# Patient Record
Sex: MALE | Race: WHITE | Employment: FULL TIME
[De-identification: names, ages, dates, MRNs, and addresses within clinical notes are randomized per-mention and may not be internally consistent; named-entity substitution may affect disease eponyms.]

---

## 2017-10-16 ENCOUNTER — HOSPITAL ENCOUNTER (OUTPATIENT)
Dept: MRI IMAGING | Facility: CLINIC | Age: 57
Discharge: HOME | End: 2017-10-16
Payer: COMMERCIAL

## 2017-10-16 DIAGNOSIS — T14.8XXA SPRAIN AND STRAIN: ICD-10-CM

## 2017-10-16 DIAGNOSIS — M25.40 EFFUSION INTO JOINT: ICD-10-CM

## 2017-10-16 PROCEDURE — 73721 MRI JNT OF LWR EXTRE W/O DYE: CPT

## 2019-02-17 ENCOUNTER — APPOINTMENT (OUTPATIENT)
Dept: GENERAL RADIOLOGY | Age: 59
DRG: 623 | End: 2019-02-17
Payer: COMMERCIAL

## 2019-02-17 ENCOUNTER — HOSPITAL ENCOUNTER (INPATIENT)
Age: 59
LOS: 6 days | Discharge: HOME | DRG: 623 | End: 2019-02-23
Attending: EMERGENCY MEDICINE | Admitting: INTERNAL MEDICINE
Payer: COMMERCIAL

## 2019-02-17 DIAGNOSIS — E11.628 DIABETIC FOOT INFECTION (HCC): Primary | ICD-10-CM

## 2019-02-17 DIAGNOSIS — E11.43 DIABETIC AUTONOMIC NEUROPATHY ASSOCIATED WITH TYPE 2 DIABETES MELLITUS (HCC): ICD-10-CM

## 2019-02-17 DIAGNOSIS — L08.9 DIABETIC FOOT INFECTION (HCC): Primary | ICD-10-CM

## 2019-02-17 DIAGNOSIS — L97.522 SKIN ULCER OF TOE OF LEFT FOOT WITH FAT LAYER EXPOSED (HCC): ICD-10-CM

## 2019-02-17 PROBLEM — G47.33 OBSTRUCTIVE SLEEP APNEA: Status: ACTIVE | Noted: 2019-02-17

## 2019-02-17 PROBLEM — N28.9 RENAL INSUFFICIENCY: Status: ACTIVE | Noted: 2019-02-17

## 2019-02-17 PROBLEM — E87.29 HIGH ANION GAP METABOLIC ACIDOSIS: Status: ACTIVE | Noted: 2019-02-17

## 2019-02-17 PROBLEM — E66.01 OBESITY, MORBID, BMI 40.0-49.9 (HCC): Status: ACTIVE | Noted: 2019-02-17

## 2019-02-17 LAB
ABSOLUTE EOS #: 0.33 K/UL (ref 0–0.4)
ABSOLUTE IMMATURE GRANULOCYTE: ABNORMAL K/UL (ref 0–0.3)
ABSOLUTE LYMPH #: 1.22 K/UL (ref 1–4.8)
ABSOLUTE MONO #: 0.67 K/UL (ref 0.2–0.8)
ANION GAP SERPL CALCULATED.3IONS-SCNC: 18 MMOL/L (ref 9–17)
BASOPHILS # BLD: 1 %
BASOPHILS ABSOLUTE: 0.11 K/UL (ref 0–0.2)
BETA-HYDROXYBUTYRATE: 0.17 MMOL/L (ref 0.02–0.27)
BUN BLDV-MCNC: 24 MG/DL (ref 6–20)
BUN/CREAT BLD: 16 (ref 9–20)
C-REACTIVE PROTEIN: 113.4 MG/L (ref 0–5)
CALCIUM SERPL-MCNC: 9.3 MG/DL (ref 8.6–10.4)
CHLORIDE BLD-SCNC: 96 MMOL/L (ref 98–107)
CO2: 19 MMOL/L (ref 20–31)
CREAT SERPL-MCNC: 1.51 MG/DL (ref 0.7–1.2)
DIFFERENTIAL TYPE: ABNORMAL
EOSINOPHILS RELATIVE PERCENT: 3 % (ref 1–4)
GFR AFRICAN AMERICAN: 58 ML/MIN
GFR NON-AFRICAN AMERICAN: 48 ML/MIN
GFR SERPL CREATININE-BSD FRML MDRD: ABNORMAL ML/MIN/{1.73_M2}
GFR SERPL CREATININE-BSD FRML MDRD: ABNORMAL ML/MIN/{1.73_M2}
GLUCOSE BLD-MCNC: 179 MG/DL (ref 75–110)
GLUCOSE BLD-MCNC: 220 MG/DL (ref 75–110)
GLUCOSE BLD-MCNC: 244 MG/DL (ref 75–110)
GLUCOSE BLD-MCNC: 269 MG/DL (ref 75–110)
GLUCOSE BLD-MCNC: 363 MG/DL (ref 70–99)
HCT VFR BLD CALC: 45.4 % (ref 41–53)
HEMOGLOBIN: 15.3 G/DL (ref 13.5–17.5)
IMMATURE GRANULOCYTES: ABNORMAL %
LYMPHOCYTES # BLD: 11 % (ref 24–44)
MCH RBC QN AUTO: 30.7 PG (ref 26–34)
MCHC RBC AUTO-ENTMCNC: 33.7 G/DL (ref 31–37)
MCV RBC AUTO: 91.1 FL (ref 80–100)
MONOCYTES # BLD: 6 % (ref 1–7)
NRBC AUTOMATED: ABNORMAL PER 100 WBC
PDW BLD-RTO: 12.1 % (ref 11.5–14.5)
PLATELET # BLD: 202 K/UL (ref 130–400)
PLATELET ESTIMATE: ABNORMAL
PMV BLD AUTO: ABNORMAL FL (ref 6–12)
POTASSIUM SERPL-SCNC: 3.9 MMOL/L (ref 3.7–5.3)
RBC # BLD: 4.99 M/UL (ref 4.5–5.9)
RBC # BLD: ABNORMAL 10*6/UL
SEDIMENTATION RATE, ERYTHROCYTE: 45 MM (ref 0–15)
SEG NEUTROPHILS: 79 % (ref 36–66)
SEGMENTED NEUTROPHILS ABSOLUTE COUNT: 8.77 K/UL (ref 1.8–7.7)
SODIUM BLD-SCNC: 133 MMOL/L (ref 135–144)
WBC # BLD: 11.1 K/UL (ref 3.5–11)
WBC # BLD: ABNORMAL 10*3/UL

## 2019-02-17 PROCEDURE — 87205 SMEAR GRAM STAIN: CPT

## 2019-02-17 PROCEDURE — 87070 CULTURE OTHR SPECIMN AEROBIC: CPT

## 2019-02-17 PROCEDURE — 86140 C-REACTIVE PROTEIN: CPT

## 2019-02-17 PROCEDURE — 82947 ASSAY GLUCOSE BLOOD QUANT: CPT

## 2019-02-17 PROCEDURE — 6370000000 HC RX 637 (ALT 250 FOR IP): Performed by: INTERNAL MEDICINE

## 2019-02-17 PROCEDURE — 99223 1ST HOSP IP/OBS HIGH 75: CPT | Performed by: INTERNAL MEDICINE

## 2019-02-17 PROCEDURE — 73630 X-RAY EXAM OF FOOT: CPT

## 2019-02-17 PROCEDURE — 87077 CULTURE AEROBIC IDENTIFY: CPT

## 2019-02-17 PROCEDURE — 36415 COLL VENOUS BLD VENIPUNCTURE: CPT

## 2019-02-17 PROCEDURE — 85651 RBC SED RATE NONAUTOMATED: CPT

## 2019-02-17 PROCEDURE — 2500000003 HC RX 250 WO HCPCS: Performed by: INTERNAL MEDICINE

## 2019-02-17 PROCEDURE — 1200000000 HC SEMI PRIVATE

## 2019-02-17 PROCEDURE — 82010 KETONE BODYS QUAN: CPT

## 2019-02-17 PROCEDURE — 87186 SC STD MICRODIL/AGAR DIL: CPT

## 2019-02-17 PROCEDURE — 80048 BASIC METABOLIC PNL TOTAL CA: CPT

## 2019-02-17 PROCEDURE — 2500000003 HC RX 250 WO HCPCS: Performed by: EMERGENCY MEDICINE

## 2019-02-17 PROCEDURE — 2580000003 HC RX 258: Performed by: INTERNAL MEDICINE

## 2019-02-17 PROCEDURE — 6360000002 HC RX W HCPCS: Performed by: STUDENT IN AN ORGANIZED HEALTH CARE EDUCATION/TRAINING PROGRAM

## 2019-02-17 PROCEDURE — 85025 COMPLETE CBC W/AUTO DIFF WBC: CPT

## 2019-02-17 PROCEDURE — 87040 BLOOD CULTURE FOR BACTERIA: CPT

## 2019-02-17 PROCEDURE — 2580000003 HC RX 258: Performed by: STUDENT IN AN ORGANIZED HEALTH CARE EDUCATION/TRAINING PROGRAM

## 2019-02-17 PROCEDURE — 99284 EMERGENCY DEPT VISIT MOD MDM: CPT

## 2019-02-17 RX ORDER — POTASSIUM CHLORIDE 20MEQ/15ML
40 LIQUID (ML) ORAL PRN
Status: DISCONTINUED | OUTPATIENT
Start: 2019-02-17 | End: 2019-02-23 | Stop reason: HOSPADM

## 2019-02-17 RX ORDER — SODIUM CHLORIDE 0.9 % (FLUSH) 0.9 %
10 SYRINGE (ML) INJECTION PRN
Status: DISCONTINUED | OUTPATIENT
Start: 2019-02-17 | End: 2019-02-23 | Stop reason: HOSPADM

## 2019-02-17 RX ORDER — NICOTINE 21 MG/24HR
1 PATCH, TRANSDERMAL 24 HOURS TRANSDERMAL DAILY PRN
Status: DISCONTINUED | OUTPATIENT
Start: 2019-02-17 | End: 2019-02-23 | Stop reason: HOSPADM

## 2019-02-17 RX ORDER — GLIMEPIRIDE 2 MG/1
4 TABLET ORAL 2 TIMES DAILY WITH MEALS
Status: DISCONTINUED | OUTPATIENT
Start: 2019-02-17 | End: 2019-02-23 | Stop reason: HOSPADM

## 2019-02-17 RX ORDER — MAGNESIUM SULFATE 1 G/100ML
1 INJECTION INTRAVENOUS PRN
Status: DISCONTINUED | OUTPATIENT
Start: 2019-02-17 | End: 2019-02-23 | Stop reason: HOSPADM

## 2019-02-17 RX ORDER — NICOTINE POLACRILEX 4 MG
15 LOZENGE BUCCAL PRN
Status: DISCONTINUED | OUTPATIENT
Start: 2019-02-17 | End: 2019-02-23 | Stop reason: HOSPADM

## 2019-02-17 RX ORDER — POTASSIUM CHLORIDE 20 MEQ/1
40 TABLET, EXTENDED RELEASE ORAL PRN
Status: DISCONTINUED | OUTPATIENT
Start: 2019-02-17 | End: 2019-02-23 | Stop reason: HOSPADM

## 2019-02-17 RX ORDER — ONDANSETRON 2 MG/ML
4 INJECTION INTRAMUSCULAR; INTRAVENOUS EVERY 6 HOURS PRN
Status: DISCONTINUED | OUTPATIENT
Start: 2019-02-17 | End: 2019-02-17

## 2019-02-17 RX ORDER — ONDANSETRON 2 MG/ML
4 INJECTION INTRAMUSCULAR; INTRAVENOUS EVERY 6 HOURS PRN
Status: DISCONTINUED | OUTPATIENT
Start: 2019-02-17 | End: 2019-02-23 | Stop reason: HOSPADM

## 2019-02-17 RX ORDER — SODIUM HYPOCHLORITE 1.25 MG/ML
SOLUTION TOPICAL DAILY
Status: DISCONTINUED | OUTPATIENT
Start: 2019-02-17 | End: 2019-02-23 | Stop reason: HOSPADM

## 2019-02-17 RX ORDER — LORAZEPAM 1 MG/1
0.5 TABLET ORAL NIGHTLY
Status: DISCONTINUED | OUTPATIENT
Start: 2019-02-17 | End: 2019-02-23 | Stop reason: HOSPADM

## 2019-02-17 RX ORDER — BISACODYL 10 MG
10 SUPPOSITORY, RECTAL RECTAL DAILY PRN
Status: DISCONTINUED | OUTPATIENT
Start: 2019-02-17 | End: 2019-02-23 | Stop reason: HOSPADM

## 2019-02-17 RX ORDER — SODIUM CHLORIDE 0.9 % (FLUSH) 0.9 %
10 SYRINGE (ML) INJECTION EVERY 12 HOURS SCHEDULED
Status: DISCONTINUED | OUTPATIENT
Start: 2019-02-17 | End: 2019-02-23 | Stop reason: HOSPADM

## 2019-02-17 RX ORDER — SODIUM CHLORIDE 9 MG/ML
INJECTION, SOLUTION INTRAVENOUS CONTINUOUS
Status: DISCONTINUED | OUTPATIENT
Start: 2019-02-17 | End: 2019-02-18

## 2019-02-17 RX ORDER — ONDANSETRON 4 MG/1
4 TABLET, ORALLY DISINTEGRATING ORAL EVERY 6 HOURS PRN
Status: DISCONTINUED | OUTPATIENT
Start: 2019-02-17 | End: 2019-02-23 | Stop reason: HOSPADM

## 2019-02-17 RX ORDER — DEXTROSE MONOHYDRATE 25 G/50ML
12.5 INJECTION, SOLUTION INTRAVENOUS PRN
Status: DISCONTINUED | OUTPATIENT
Start: 2019-02-17 | End: 2019-02-23 | Stop reason: HOSPADM

## 2019-02-17 RX ORDER — POTASSIUM CHLORIDE 7.45 MG/ML
10 INJECTION INTRAVENOUS PRN
Status: DISCONTINUED | OUTPATIENT
Start: 2019-02-17 | End: 2019-02-23 | Stop reason: HOSPADM

## 2019-02-17 RX ORDER — DEXTROSE MONOHYDRATE 50 MG/ML
100 INJECTION, SOLUTION INTRAVENOUS PRN
Status: DISCONTINUED | OUTPATIENT
Start: 2019-02-17 | End: 2019-02-23 | Stop reason: HOSPADM

## 2019-02-17 RX ORDER — ALLOPURINOL 300 MG/1
300 TABLET ORAL DAILY
Status: DISCONTINUED | OUTPATIENT
Start: 2019-02-17 | End: 2019-02-23 | Stop reason: HOSPADM

## 2019-02-17 RX ORDER — ACETAMINOPHEN 325 MG/1
650 TABLET ORAL EVERY 4 HOURS PRN
Status: DISCONTINUED | OUTPATIENT
Start: 2019-02-17 | End: 2019-02-23 | Stop reason: HOSPADM

## 2019-02-17 RX ADMIN — ACETAMINOPHEN 650 MG: 325 TABLET ORAL at 18:41

## 2019-02-17 RX ADMIN — VANCOMYCIN HYDROCHLORIDE 1500 MG: 5 INJECTION, POWDER, LYOPHILIZED, FOR SOLUTION INTRAVENOUS at 14:25

## 2019-02-17 RX ADMIN — INSULIN LISPRO 1 UNITS: 100 INJECTION, SOLUTION INTRAVENOUS; SUBCUTANEOUS at 22:44

## 2019-02-17 RX ADMIN — INSULIN LISPRO 2 UNITS: 100 INJECTION, SOLUTION INTRAVENOUS; SUBCUTANEOUS at 18:33

## 2019-02-17 RX ADMIN — LORAZEPAM 0.5 MG: 1 TABLET ORAL at 22:44

## 2019-02-17 RX ADMIN — TAZOBACTAM SODIUM AND PIPERACILLIN SODIUM 3.38 G: 375; 3 INJECTION, SOLUTION INTRAVENOUS at 18:33

## 2019-02-17 RX ADMIN — SODIUM CHLORIDE: 9 INJECTION, SOLUTION INTRAVENOUS at 12:30

## 2019-02-17 RX ADMIN — TAZOBACTAM SODIUM AND PIPERACILLIN SODIUM 3.38 G: 375; 3 INJECTION, SOLUTION INTRAVENOUS at 10:54

## 2019-02-17 ASSESSMENT — ENCOUNTER SYMPTOMS
CONSTIPATION: 0
FACIAL SWELLING: 0
COLOR CHANGE: 1
DIARRHEA: 0
NAUSEA: 0
COLOR CHANGE: 0
VOMITING: 0
EYE DISCHARGE: 0
ABDOMINAL PAIN: 0
EYE REDNESS: 0
CHEST TIGHTNESS: 0
COUGH: 0
SHORTNESS OF BREATH: 0

## 2019-02-17 ASSESSMENT — PAIN SCALES - GENERAL
PAINLEVEL_OUTOF10: 0
PAINLEVEL_OUTOF10: 3
PAINLEVEL_OUTOF10: 3
PAINLEVEL_OUTOF10: 0

## 2019-02-17 ASSESSMENT — PAIN DESCRIPTION - PAIN TYPE: TYPE: ACUTE PAIN

## 2019-02-18 ENCOUNTER — APPOINTMENT (OUTPATIENT)
Dept: MRI IMAGING | Age: 59
DRG: 623 | End: 2019-02-18
Payer: COMMERCIAL

## 2019-02-18 ENCOUNTER — ANESTHESIA EVENT (OUTPATIENT)
Dept: OPERATING ROOM | Age: 59
DRG: 623 | End: 2019-02-18
Payer: COMMERCIAL

## 2019-02-18 LAB
ANION GAP SERPL CALCULATED.3IONS-SCNC: 14 MMOL/L (ref 9–17)
BUN BLDV-MCNC: 22 MG/DL (ref 6–20)
BUN/CREAT BLD: 16 (ref 9–20)
C-REACTIVE PROTEIN: 97.3 MG/L (ref 0–5)
CALCIUM SERPL-MCNC: 9.1 MG/DL (ref 8.6–10.4)
CHLORIDE BLD-SCNC: 99 MMOL/L (ref 98–107)
CO2: 23 MMOL/L (ref 20–31)
CREAT SERPL-MCNC: 1.41 MG/DL (ref 0.7–1.2)
CULTURE: NORMAL
DIRECT EXAM: NORMAL
GFR AFRICAN AMERICAN: >60 ML/MIN
GFR NON-AFRICAN AMERICAN: 51 ML/MIN
GFR SERPL CREATININE-BSD FRML MDRD: ABNORMAL ML/MIN/{1.73_M2}
GFR SERPL CREATININE-BSD FRML MDRD: ABNORMAL ML/MIN/{1.73_M2}
GLUCOSE BLD-MCNC: 193 MG/DL (ref 75–110)
GLUCOSE BLD-MCNC: 205 MG/DL (ref 75–110)
GLUCOSE BLD-MCNC: 216 MG/DL (ref 70–99)
GLUCOSE BLD-MCNC: 263 MG/DL (ref 75–110)
GLUCOSE BLD-MCNC: 305 MG/DL (ref 75–110)
HCT VFR BLD CALC: 42.6 % (ref 41–53)
HEMOGLOBIN: 14.4 G/DL (ref 13.5–17.5)
Lab: NORMAL
MCH RBC QN AUTO: 31 PG (ref 26–34)
MCHC RBC AUTO-ENTMCNC: 34 G/DL (ref 31–37)
MCV RBC AUTO: 91.4 FL (ref 80–100)
NRBC AUTOMATED: ABNORMAL PER 100 WBC
PDW BLD-RTO: 13.1 % (ref 11.5–14.5)
PLATELET # BLD: 221 K/UL (ref 130–400)
PMV BLD AUTO: 7.3 FL (ref 6–12)
POTASSIUM SERPL-SCNC: 4.3 MMOL/L (ref 3.7–5.3)
RBC # BLD: 4.66 M/UL (ref 4.5–5.9)
SODIUM BLD-SCNC: 136 MMOL/L (ref 135–144)
SPECIMEN DESCRIPTION: NORMAL
WBC # BLD: 12.1 K/UL (ref 3.5–11)

## 2019-02-18 PROCEDURE — 73718 MRI LOWER EXTREMITY W/O DYE: CPT

## 2019-02-18 PROCEDURE — 86140 C-REACTIVE PROTEIN: CPT

## 2019-02-18 PROCEDURE — 6360000002 HC RX W HCPCS: Performed by: INTERNAL MEDICINE

## 2019-02-18 PROCEDURE — 93923 UPR/LXTR ART STDY 3+ LVLS: CPT

## 2019-02-18 PROCEDURE — 6360000002 HC RX W HCPCS: Performed by: STUDENT IN AN ORGANIZED HEALTH CARE EDUCATION/TRAINING PROGRAM

## 2019-02-18 PROCEDURE — 85027 COMPLETE CBC AUTOMATED: CPT

## 2019-02-18 PROCEDURE — 2580000003 HC RX 258: Performed by: STUDENT IN AN ORGANIZED HEALTH CARE EDUCATION/TRAINING PROGRAM

## 2019-02-18 PROCEDURE — 36415 COLL VENOUS BLD VENIPUNCTURE: CPT

## 2019-02-18 PROCEDURE — 82947 ASSAY GLUCOSE BLOOD QUANT: CPT

## 2019-02-18 PROCEDURE — 1200000000 HC SEMI PRIVATE

## 2019-02-18 PROCEDURE — 2580000003 HC RX 258: Performed by: INTERNAL MEDICINE

## 2019-02-18 PROCEDURE — 2500000003 HC RX 250 WO HCPCS: Performed by: INTERNAL MEDICINE

## 2019-02-18 PROCEDURE — 6370000000 HC RX 637 (ALT 250 FOR IP): Performed by: INTERNAL MEDICINE

## 2019-02-18 PROCEDURE — 99232 SBSQ HOSP IP/OBS MODERATE 35: CPT | Performed by: INTERNAL MEDICINE

## 2019-02-18 PROCEDURE — 80202 ASSAY OF VANCOMYCIN: CPT

## 2019-02-18 PROCEDURE — 80048 BASIC METABOLIC PNL TOTAL CA: CPT

## 2019-02-18 RX ORDER — SODIUM CHLORIDE, SODIUM LACTATE, POTASSIUM CHLORIDE, CALCIUM CHLORIDE 600; 310; 30; 20 MG/100ML; MG/100ML; MG/100ML; MG/100ML
INJECTION, SOLUTION INTRAVENOUS CONTINUOUS
Status: CANCELLED | OUTPATIENT
Start: 2019-02-18

## 2019-02-18 RX ORDER — OXYCODONE AND ACETAMINOPHEN 10; 325 MG/1; MG/1
1 TABLET ORAL EVERY 4 HOURS PRN
Status: DISCONTINUED | OUTPATIENT
Start: 2019-02-18 | End: 2019-02-23 | Stop reason: HOSPADM

## 2019-02-18 RX ORDER — SODIUM CHLORIDE 9 MG/ML
INJECTION, SOLUTION INTRAVENOUS CONTINUOUS
Status: CANCELLED | OUTPATIENT
Start: 2019-02-18

## 2019-02-18 RX ORDER — SODIUM CHLORIDE 0.9 % (FLUSH) 0.9 %
10 SYRINGE (ML) INJECTION EVERY 12 HOURS SCHEDULED
Status: CANCELLED | OUTPATIENT
Start: 2019-02-18

## 2019-02-18 RX ORDER — MULTIVITAMIN WITH IRON
250 TABLET ORAL DAILY
COMMUNITY
End: 2020-12-22

## 2019-02-18 RX ORDER — OXYCODONE AND ACETAMINOPHEN 10; 325 MG/1; MG/1
1 TABLET ORAL 2 TIMES DAILY PRN
Status: ON HOLD | COMMUNITY
End: 2019-02-23 | Stop reason: HOSPADM

## 2019-02-18 RX ORDER — ALLOPURINOL 300 MG/1
300 TABLET ORAL DAILY
COMMUNITY

## 2019-02-18 RX ORDER — NAPROXEN SODIUM 220 MG
220 TABLET ORAL EVERY MORNING
Status: ON HOLD | COMMUNITY
End: 2019-02-23 | Stop reason: HOSPADM

## 2019-02-18 RX ORDER — GABAPENTIN 300 MG/1
300 CAPSULE ORAL 2 TIMES DAILY PRN
COMMUNITY
End: 2020-12-22 | Stop reason: ALTCHOICE

## 2019-02-18 RX ORDER — SODIUM CHLORIDE 0.9 % (FLUSH) 0.9 %
10 SYRINGE (ML) INJECTION PRN
Status: CANCELLED | OUTPATIENT
Start: 2019-02-18

## 2019-02-18 RX ORDER — CHOLECALCIFEROL (VITAMIN D3) 125 MCG
5-10 CAPSULE ORAL NIGHTLY
COMMUNITY

## 2019-02-18 RX ORDER — FLUTICASONE PROPIONATE 50 MCG
1-2 SPRAY, SUSPENSION (ML) NASAL DAILY PRN
COMMUNITY

## 2019-02-18 RX ORDER — GLIPIZIDE AND METFORMIN HCL 5; 500 MG/1; MG/1
2 TABLET, FILM COATED ORAL 2 TIMES DAILY
Status: ON HOLD | COMMUNITY
End: 2020-10-28 | Stop reason: HOSPADM

## 2019-02-18 RX ORDER — LIDOCAINE HYDROCHLORIDE 10 MG/ML
1 INJECTION, SOLUTION EPIDURAL; INFILTRATION; INTRACAUDAL; PERINEURAL
Status: CANCELLED | OUTPATIENT
Start: 2019-02-18 | End: 2019-02-18

## 2019-02-18 RX ORDER — ZOLPIDEM TARTRATE 5 MG/1
5 TABLET ORAL NIGHTLY PRN
Status: DISCONTINUED | OUTPATIENT
Start: 2019-02-18 | End: 2019-02-23 | Stop reason: HOSPADM

## 2019-02-18 RX ORDER — M-VIT,TX,IRON,MINS/CALC/FOLIC 27MG-0.4MG
1 TABLET ORAL DAILY
COMMUNITY

## 2019-02-18 RX ADMIN — TAZOBACTAM SODIUM AND PIPERACILLIN SODIUM 3.38 G: 375; 3 INJECTION, SOLUTION INTRAVENOUS at 18:22

## 2019-02-18 RX ADMIN — OXYCODONE AND ACETAMINOPHEN 1 TABLET: 10; 325 TABLET ORAL at 21:50

## 2019-02-18 RX ADMIN — LORAZEPAM 0.5 MG: 1 TABLET ORAL at 21:36

## 2019-02-18 RX ADMIN — VANCOMYCIN HYDROCHLORIDE 1500 MG: 5 INJECTION, POWDER, LYOPHILIZED, FOR SOLUTION INTRAVENOUS at 14:39

## 2019-02-18 RX ADMIN — INSULIN LISPRO 4 UNITS: 100 INJECTION, SOLUTION INTRAVENOUS; SUBCUTANEOUS at 17:16

## 2019-02-18 RX ADMIN — TAZOBACTAM SODIUM AND PIPERACILLIN SODIUM 3.38 G: 375; 3 INJECTION, SOLUTION INTRAVENOUS at 03:42

## 2019-02-18 RX ADMIN — OXYCODONE AND ACETAMINOPHEN 1 TABLET: 10; 325 TABLET ORAL at 14:46

## 2019-02-18 RX ADMIN — ENOXAPARIN SODIUM 40 MG: 40 INJECTION SUBCUTANEOUS at 21:36

## 2019-02-18 RX ADMIN — INSULIN LISPRO 1 UNITS: 100 INJECTION, SOLUTION INTRAVENOUS; SUBCUTANEOUS at 21:36

## 2019-02-18 RX ADMIN — INSULIN LISPRO 3 UNITS: 100 INJECTION, SOLUTION INTRAVENOUS; SUBCUTANEOUS at 11:52

## 2019-02-18 RX ADMIN — INSULIN LISPRO 1 UNITS: 100 INJECTION, SOLUTION INTRAVENOUS; SUBCUTANEOUS at 08:12

## 2019-02-18 RX ADMIN — GLIMEPIRIDE 4 MG: 2 TABLET ORAL at 08:12

## 2019-02-18 RX ADMIN — OXYCODONE AND ACETAMINOPHEN 1 TABLET: 10; 325 TABLET ORAL at 10:38

## 2019-02-18 RX ADMIN — ACETAMINOPHEN 650 MG: 325 TABLET ORAL at 01:37

## 2019-02-18 RX ADMIN — TAZOBACTAM SODIUM AND PIPERACILLIN SODIUM 3.38 G: 375; 3 INJECTION, SOLUTION INTRAVENOUS at 10:33

## 2019-02-18 RX ADMIN — VANCOMYCIN HYDROCHLORIDE 1500 MG: 5 INJECTION, POWDER, LYOPHILIZED, FOR SOLUTION INTRAVENOUS at 01:34

## 2019-02-18 RX ADMIN — ZOLPIDEM TARTRATE 5 MG: 5 TABLET ORAL at 21:36

## 2019-02-18 RX ADMIN — GLIMEPIRIDE 4 MG: 2 TABLET ORAL at 17:17

## 2019-02-18 RX ADMIN — ENOXAPARIN SODIUM 40 MG: 40 INJECTION SUBCUTANEOUS at 10:33

## 2019-02-18 RX ADMIN — SODIUM CHLORIDE: 9 INJECTION, SOLUTION INTRAVENOUS at 03:42

## 2019-02-18 RX ADMIN — ALLOPURINOL 300 MG: 300 TABLET ORAL at 08:12

## 2019-02-18 ASSESSMENT — PAIN DESCRIPTION - ONSET
ONSET: ON-GOING

## 2019-02-18 ASSESSMENT — PAIN DESCRIPTION - LOCATION
LOCATION: GENERALIZED
LOCATION: NECK
LOCATION: HEAD

## 2019-02-18 ASSESSMENT — PAIN DESCRIPTION - PAIN TYPE
TYPE: ACUTE PAIN

## 2019-02-18 ASSESSMENT — PAIN SCALES - GENERAL
PAINLEVEL_OUTOF10: 5
PAINLEVEL_OUTOF10: 4
PAINLEVEL_OUTOF10: 5
PAINLEVEL_OUTOF10: 1
PAINLEVEL_OUTOF10: 6

## 2019-02-18 ASSESSMENT — PAIN DESCRIPTION - PROGRESSION
CLINICAL_PROGRESSION: NOT CHANGED
CLINICAL_PROGRESSION: GRADUALLY WORSENING
CLINICAL_PROGRESSION: NOT CHANGED

## 2019-02-18 ASSESSMENT — PAIN DESCRIPTION - DESCRIPTORS
DESCRIPTORS: ACHING
DESCRIPTORS: HEADACHE
DESCRIPTORS: ACHING

## 2019-02-18 ASSESSMENT — PAIN DESCRIPTION - FREQUENCY
FREQUENCY: INTERMITTENT

## 2019-02-18 ASSESSMENT — PAIN DESCRIPTION - ORIENTATION
ORIENTATION: POSTERIOR
ORIENTATION: POSTERIOR

## 2019-02-19 ENCOUNTER — ANESTHESIA (OUTPATIENT)
Dept: OPERATING ROOM | Age: 59
DRG: 623 | End: 2019-02-19
Payer: COMMERCIAL

## 2019-02-19 VITALS — DIASTOLIC BLOOD PRESSURE: 78 MMHG | OXYGEN SATURATION: 94 % | SYSTOLIC BLOOD PRESSURE: 167 MMHG

## 2019-02-19 LAB
GLUCOSE BLD-MCNC: 147 MG/DL (ref 75–110)
GLUCOSE BLD-MCNC: 169 MG/DL (ref 75–110)
GLUCOSE BLD-MCNC: 172 MG/DL (ref 75–110)
GLUCOSE BLD-MCNC: 245 MG/DL (ref 75–110)
GLUCOSE BLD-MCNC: 308 MG/DL (ref 75–110)
VANCOMYCIN TROUGH DATE LAST DOSE: ABNORMAL
VANCOMYCIN TROUGH DOSE AMOUNT: ABNORMAL
VANCOMYCIN TROUGH TIME LAST DOSE: ABNORMAL
VANCOMYCIN TROUGH: 9.7 UG/ML (ref 10–20)

## 2019-02-19 PROCEDURE — 7100000001 HC PACU RECOVERY - ADDTL 15 MIN: Performed by: PODIATRIST

## 2019-02-19 PROCEDURE — 3600000002 HC SURGERY LEVEL 2 BASE: Performed by: PODIATRIST

## 2019-02-19 PROCEDURE — 1200000000 HC SEMI PRIVATE

## 2019-02-19 PROCEDURE — 2500000003 HC RX 250 WO HCPCS: Performed by: INTERNAL MEDICINE

## 2019-02-19 PROCEDURE — 6370000000 HC RX 637 (ALT 250 FOR IP): Performed by: INTERNAL MEDICINE

## 2019-02-19 PROCEDURE — 6360000002 HC RX W HCPCS: Performed by: STUDENT IN AN ORGANIZED HEALTH CARE EDUCATION/TRAINING PROGRAM

## 2019-02-19 PROCEDURE — 3700000001 HC ADD 15 MINUTES (ANESTHESIA): Performed by: PODIATRIST

## 2019-02-19 PROCEDURE — 0JBR0ZZ EXCISION OF LEFT FOOT SUBCUTANEOUS TISSUE AND FASCIA, OPEN APPROACH: ICD-10-PCS | Performed by: FAMILY MEDICINE

## 2019-02-19 PROCEDURE — 7100000000 HC PACU RECOVERY - FIRST 15 MIN: Performed by: PODIATRIST

## 2019-02-19 PROCEDURE — 87205 SMEAR GRAM STAIN: CPT

## 2019-02-19 PROCEDURE — 2580000003 HC RX 258: Performed by: NURSE ANESTHETIST, CERTIFIED REGISTERED

## 2019-02-19 PROCEDURE — 2709999900 HC NON-CHARGEABLE SUPPLY: Performed by: PODIATRIST

## 2019-02-19 PROCEDURE — 2580000003 HC RX 258: Performed by: STUDENT IN AN ORGANIZED HEALTH CARE EDUCATION/TRAINING PROGRAM

## 2019-02-19 PROCEDURE — 3600000012 HC SURGERY LEVEL 2 ADDTL 15MIN: Performed by: PODIATRIST

## 2019-02-19 PROCEDURE — 3700000000 HC ANESTHESIA ATTENDED CARE: Performed by: PODIATRIST

## 2019-02-19 PROCEDURE — 6360000002 HC RX W HCPCS: Performed by: NURSE ANESTHETIST, CERTIFIED REGISTERED

## 2019-02-19 PROCEDURE — 99232 SBSQ HOSP IP/OBS MODERATE 35: CPT | Performed by: INTERNAL MEDICINE

## 2019-02-19 PROCEDURE — 87070 CULTURE OTHR SPECIMN AEROBIC: CPT

## 2019-02-19 PROCEDURE — 87075 CULTR BACTERIA EXCEPT BLOOD: CPT

## 2019-02-19 PROCEDURE — 82947 ASSAY GLUCOSE BLOOD QUANT: CPT

## 2019-02-19 PROCEDURE — 2500000003 HC RX 250 WO HCPCS: Performed by: NURSE ANESTHETIST, CERTIFIED REGISTERED

## 2019-02-19 RX ORDER — FENTANYL CITRATE 50 UG/ML
25 INJECTION, SOLUTION INTRAMUSCULAR; INTRAVENOUS EVERY 5 MIN PRN
Status: DISCONTINUED | OUTPATIENT
Start: 2019-02-19 | End: 2019-02-19 | Stop reason: HOSPADM

## 2019-02-19 RX ORDER — PROPOFOL 10 MG/ML
INJECTION, EMULSION INTRAVENOUS CONTINUOUS PRN
Status: DISCONTINUED | OUTPATIENT
Start: 2019-02-19 | End: 2019-02-19 | Stop reason: SDUPTHER

## 2019-02-19 RX ORDER — MIDAZOLAM HYDROCHLORIDE 1 MG/ML
INJECTION INTRAMUSCULAR; INTRAVENOUS PRN
Status: DISCONTINUED | OUTPATIENT
Start: 2019-02-19 | End: 2019-02-19 | Stop reason: SDUPTHER

## 2019-02-19 RX ORDER — ONDANSETRON 2 MG/ML
4 INJECTION INTRAMUSCULAR; INTRAVENOUS
Status: DISCONTINUED | OUTPATIENT
Start: 2019-02-19 | End: 2019-02-19 | Stop reason: HOSPADM

## 2019-02-19 RX ORDER — FENTANYL CITRATE 50 UG/ML
INJECTION, SOLUTION INTRAMUSCULAR; INTRAVENOUS PRN
Status: DISCONTINUED | OUTPATIENT
Start: 2019-02-19 | End: 2019-02-19 | Stop reason: SDUPTHER

## 2019-02-19 RX ORDER — FENTANYL CITRATE 50 UG/ML
50 INJECTION, SOLUTION INTRAMUSCULAR; INTRAVENOUS EVERY 5 MIN PRN
Status: DISCONTINUED | OUTPATIENT
Start: 2019-02-19 | End: 2019-02-19 | Stop reason: HOSPADM

## 2019-02-19 RX ORDER — GLYCOPYRROLATE 1 MG/5 ML
SYRINGE (ML) INTRAVENOUS PRN
Status: DISCONTINUED | OUTPATIENT
Start: 2019-02-19 | End: 2019-02-19 | Stop reason: SDUPTHER

## 2019-02-19 RX ORDER — OXYCODONE HYDROCHLORIDE AND ACETAMINOPHEN 5; 325 MG/1; MG/1
1 TABLET ORAL
Status: DISCONTINUED | OUTPATIENT
Start: 2019-02-19 | End: 2019-02-19 | Stop reason: HOSPADM

## 2019-02-19 RX ORDER — SODIUM CHLORIDE 9 MG/ML
INJECTION, SOLUTION INTRAVENOUS CONTINUOUS PRN
Status: DISCONTINUED | OUTPATIENT
Start: 2019-02-19 | End: 2019-02-19 | Stop reason: SDUPTHER

## 2019-02-19 RX ORDER — KETAMINE HYDROCHLORIDE 100 MG/ML
INJECTION, SOLUTION INTRAMUSCULAR; INTRAVENOUS PRN
Status: DISCONTINUED | OUTPATIENT
Start: 2019-02-19 | End: 2019-02-19 | Stop reason: SDUPTHER

## 2019-02-19 RX ORDER — SODIUM CHLORIDE, SODIUM LACTATE, POTASSIUM CHLORIDE, CALCIUM CHLORIDE 600; 310; 30; 20 MG/100ML; MG/100ML; MG/100ML; MG/100ML
INJECTION, SOLUTION INTRAVENOUS CONTINUOUS PRN
Status: DISCONTINUED | OUTPATIENT
Start: 2019-02-19 | End: 2019-02-19 | Stop reason: SDUPTHER

## 2019-02-19 RX ORDER — LIDOCAINE HYDROCHLORIDE 20 MG/ML
INJECTION, SOLUTION EPIDURAL; INFILTRATION; INTRACAUDAL; PERINEURAL PRN
Status: DISCONTINUED | OUTPATIENT
Start: 2019-02-19 | End: 2019-02-19 | Stop reason: SDUPTHER

## 2019-02-19 RX ADMIN — KETAMINE HYDROCHLORIDE 10 MG: 100 INJECTION INTRAMUSCULAR; INTRAVENOUS at 13:48

## 2019-02-19 RX ADMIN — VANCOMYCIN HYDROCHLORIDE 1500 MG: 5 INJECTION, POWDER, LYOPHILIZED, FOR SOLUTION INTRAVENOUS at 00:43

## 2019-02-19 RX ADMIN — KETAMINE HYDROCHLORIDE 10 MG: 100 INJECTION INTRAMUSCULAR; INTRAVENOUS at 13:27

## 2019-02-19 RX ADMIN — OXYCODONE AND ACETAMINOPHEN 1 TABLET: 10; 325 TABLET ORAL at 15:48

## 2019-02-19 RX ADMIN — OXYCODONE AND ACETAMINOPHEN 1 TABLET: 10; 325 TABLET ORAL at 21:06

## 2019-02-19 RX ADMIN — TAZOBACTAM SODIUM AND PIPERACILLIN SODIUM 3.38 G: 375; 3 INJECTION, SOLUTION INTRAVENOUS at 19:59

## 2019-02-19 RX ADMIN — INSULIN LISPRO 2 UNITS: 100 INJECTION, SOLUTION INTRAVENOUS; SUBCUTANEOUS at 21:06

## 2019-02-19 RX ADMIN — KETAMINE HYDROCHLORIDE 10 MG: 100 INJECTION INTRAMUSCULAR; INTRAVENOUS at 13:15

## 2019-02-19 RX ADMIN — SODIUM CHLORIDE: 9 INJECTION, SOLUTION INTRAVENOUS at 13:06

## 2019-02-19 RX ADMIN — Medication 0.2 MG: at 13:40

## 2019-02-19 RX ADMIN — TAZOBACTAM SODIUM AND PIPERACILLIN SODIUM 3.38 G: 375; 3 INJECTION, SOLUTION INTRAVENOUS at 03:29

## 2019-02-19 RX ADMIN — SODIUM CHLORIDE, POTASSIUM CHLORIDE, SODIUM LACTATE AND CALCIUM CHLORIDE: 600; 310; 30; 20 INJECTION, SOLUTION INTRAVENOUS at 13:28

## 2019-02-19 RX ADMIN — INSULIN LISPRO 2 UNITS: 100 INJECTION, SOLUTION INTRAVENOUS; SUBCUTANEOUS at 16:47

## 2019-02-19 RX ADMIN — MIDAZOLAM 2 MG: 1 INJECTION INTRAMUSCULAR; INTRAVENOUS at 13:08

## 2019-02-19 RX ADMIN — LIDOCAINE HYDROCHLORIDE 100 MG: 20 INJECTION, SOLUTION EPIDURAL; INFILTRATION; INTRACAUDAL; PERINEURAL at 13:12

## 2019-02-19 RX ADMIN — KETAMINE HYDROCHLORIDE 10 MG: 100 INJECTION INTRAMUSCULAR; INTRAVENOUS at 13:13

## 2019-02-19 RX ADMIN — LORAZEPAM 0.5 MG: 1 TABLET ORAL at 21:06

## 2019-02-19 RX ADMIN — ZOLPIDEM TARTRATE 5 MG: 5 TABLET ORAL at 21:06

## 2019-02-19 RX ADMIN — KETAMINE HYDROCHLORIDE 10 MG: 100 INJECTION INTRAMUSCULAR; INTRAVENOUS at 13:37

## 2019-02-19 RX ADMIN — GLIMEPIRIDE 4 MG: 2 TABLET ORAL at 16:47

## 2019-02-19 RX ADMIN — VANCOMYCIN HYDROCHLORIDE 1750 MG: 1 INJECTION, POWDER, LYOPHILIZED, FOR SOLUTION INTRAVENOUS at 15:00

## 2019-02-19 RX ADMIN — VANCOMYCIN HYDROCHLORIDE 1.75 G: 1 INJECTION, POWDER, LYOPHILIZED, FOR SOLUTION INTRAVENOUS at 13:28

## 2019-02-19 RX ADMIN — FENTANYL CITRATE 25 MCG: 50 INJECTION, SOLUTION INTRAMUSCULAR; INTRAVENOUS at 14:15

## 2019-02-19 RX ADMIN — ACETAMINOPHEN 650 MG: 325 TABLET ORAL at 08:03

## 2019-02-19 RX ADMIN — FENTANYL CITRATE 25 MCG: 50 INJECTION, SOLUTION INTRAMUSCULAR; INTRAVENOUS at 13:12

## 2019-02-19 RX ADMIN — FENTANYL CITRATE 50 MCG: 50 INJECTION, SOLUTION INTRAMUSCULAR; INTRAVENOUS at 14:10

## 2019-02-19 RX ADMIN — PROPOFOL 100 MCG/KG/MIN: 10 INJECTION, EMULSION INTRAVENOUS at 13:12

## 2019-02-19 ASSESSMENT — PULMONARY FUNCTION TESTS
PIF_VALUE: 1
PIF_VALUE: 0
PIF_VALUE: 1
PIF_VALUE: 0
PIF_VALUE: 1
PIF_VALUE: 0
PIF_VALUE: 1

## 2019-02-19 ASSESSMENT — PAIN DESCRIPTION - PAIN TYPE
TYPE: ACUTE PAIN
TYPE: ACUTE PAIN;SURGICAL PAIN

## 2019-02-19 ASSESSMENT — PAIN DESCRIPTION - ORIENTATION
ORIENTATION: RIGHT
ORIENTATION: ANTERIOR

## 2019-02-19 ASSESSMENT — PAIN DESCRIPTION - FREQUENCY
FREQUENCY: CONTINUOUS
FREQUENCY: CONTINUOUS

## 2019-02-19 ASSESSMENT — PAIN DESCRIPTION - ONSET
ONSET: ON-GOING
ONSET: GRADUAL

## 2019-02-19 ASSESSMENT — PAIN DESCRIPTION - DESCRIPTORS
DESCRIPTORS: ACHING
DESCRIPTORS: ACHING;DISCOMFORT;HEADACHE

## 2019-02-19 ASSESSMENT — PAIN SCALES - GENERAL
PAINLEVEL_OUTOF10: 0
PAINLEVEL_OUTOF10: 4
PAINLEVEL_OUTOF10: 4
PAINLEVEL_OUTOF10: 0
PAINLEVEL_OUTOF10: 5

## 2019-02-19 ASSESSMENT — PAIN DESCRIPTION - PROGRESSION
CLINICAL_PROGRESSION: GRADUALLY WORSENING
CLINICAL_PROGRESSION: NOT CHANGED

## 2019-02-19 ASSESSMENT — PAIN DESCRIPTION - LOCATION
LOCATION: FOOT
LOCATION: HEAD

## 2019-02-19 ASSESSMENT — PAIN - FUNCTIONAL ASSESSMENT: PAIN_FUNCTIONAL_ASSESSMENT: ACTIVITIES ARE NOT PREVENTED

## 2019-02-20 LAB
BUN BLDV-MCNC: 22 MG/DL (ref 6–20)
CREAT SERPL-MCNC: 1.42 MG/DL (ref 0.7–1.2)
CULTURE: ABNORMAL
DIRECT EXAM: ABNORMAL
GFR AFRICAN AMERICAN: >60 ML/MIN
GFR NON-AFRICAN AMERICAN: 51 ML/MIN
GFR SERPL CREATININE-BSD FRML MDRD: ABNORMAL ML/MIN/{1.73_M2}
GFR SERPL CREATININE-BSD FRML MDRD: ABNORMAL ML/MIN/{1.73_M2}
GLUCOSE BLD-MCNC: 244 MG/DL (ref 75–110)
GLUCOSE BLD-MCNC: 263 MG/DL (ref 75–110)
GLUCOSE BLD-MCNC: 340 MG/DL (ref 75–110)
GLUCOSE BLD-MCNC: 394 MG/DL (ref 75–110)
Lab: ABNORMAL
SPECIMEN DESCRIPTION: ABNORMAL

## 2019-02-20 PROCEDURE — 6370000000 HC RX 637 (ALT 250 FOR IP): Performed by: INTERNAL MEDICINE

## 2019-02-20 PROCEDURE — 84520 ASSAY OF UREA NITROGEN: CPT

## 2019-02-20 PROCEDURE — 6360000002 HC RX W HCPCS: Performed by: STUDENT IN AN ORGANIZED HEALTH CARE EDUCATION/TRAINING PROGRAM

## 2019-02-20 PROCEDURE — 2500000003 HC RX 250 WO HCPCS: Performed by: INTERNAL MEDICINE

## 2019-02-20 PROCEDURE — 97535 SELF CARE MNGMENT TRAINING: CPT

## 2019-02-20 PROCEDURE — 97116 GAIT TRAINING THERAPY: CPT

## 2019-02-20 PROCEDURE — 99254 IP/OBS CNSLTJ NEW/EST MOD 60: CPT | Performed by: INTERNAL MEDICINE

## 2019-02-20 PROCEDURE — 82565 ASSAY OF CREATININE: CPT

## 2019-02-20 PROCEDURE — 2580000003 HC RX 258: Performed by: STUDENT IN AN ORGANIZED HEALTH CARE EDUCATION/TRAINING PROGRAM

## 2019-02-20 PROCEDURE — 82947 ASSAY GLUCOSE BLOOD QUANT: CPT

## 2019-02-20 PROCEDURE — 36415 COLL VENOUS BLD VENIPUNCTURE: CPT

## 2019-02-20 PROCEDURE — 1200000000 HC SEMI PRIVATE

## 2019-02-20 PROCEDURE — 97166 OT EVAL MOD COMPLEX 45 MIN: CPT

## 2019-02-20 PROCEDURE — 97162 PT EVAL MOD COMPLEX 30 MIN: CPT

## 2019-02-20 PROCEDURE — 99232 SBSQ HOSP IP/OBS MODERATE 35: CPT | Performed by: INTERNAL MEDICINE

## 2019-02-20 RX ADMIN — TAZOBACTAM SODIUM AND PIPERACILLIN SODIUM 3.38 G: 375; 3 INJECTION, SOLUTION INTRAVENOUS at 17:37

## 2019-02-20 RX ADMIN — GLIMEPIRIDE 4 MG: 2 TABLET ORAL at 08:40

## 2019-02-20 RX ADMIN — TAZOBACTAM SODIUM AND PIPERACILLIN SODIUM 3.38 G: 375; 3 INJECTION, SOLUTION INTRAVENOUS at 12:07

## 2019-02-20 RX ADMIN — OXYCODONE AND ACETAMINOPHEN 1 TABLET: 10; 325 TABLET ORAL at 21:15

## 2019-02-20 RX ADMIN — VANCOMYCIN HYDROCHLORIDE 1750 MG: 1 INJECTION, POWDER, LYOPHILIZED, FOR SOLUTION INTRAVENOUS at 01:32

## 2019-02-20 RX ADMIN — INSULIN LISPRO 3 UNITS: 100 INJECTION, SOLUTION INTRAVENOUS; SUBCUTANEOUS at 12:05

## 2019-02-20 RX ADMIN — LORAZEPAM 0.5 MG: 1 TABLET ORAL at 21:09

## 2019-02-20 RX ADMIN — INSULIN LISPRO 3 UNITS: 100 INJECTION, SOLUTION INTRAVENOUS; SUBCUTANEOUS at 21:10

## 2019-02-20 RX ADMIN — OXYCODONE AND ACETAMINOPHEN 1 TABLET: 10; 325 TABLET ORAL at 08:53

## 2019-02-20 RX ADMIN — OXYCODONE AND ACETAMINOPHEN 1 TABLET: 10; 325 TABLET ORAL at 00:00

## 2019-02-20 RX ADMIN — ALLOPURINOL 300 MG: 300 TABLET ORAL at 08:40

## 2019-02-20 RX ADMIN — GLIMEPIRIDE 4 MG: 2 TABLET ORAL at 17:37

## 2019-02-20 RX ADMIN — INSULIN LISPRO 4 UNITS: 100 INJECTION, SOLUTION INTRAVENOUS; SUBCUTANEOUS at 17:37

## 2019-02-20 RX ADMIN — TAZOBACTAM SODIUM AND PIPERACILLIN SODIUM 3.38 G: 375; 3 INJECTION, SOLUTION INTRAVENOUS at 03:08

## 2019-02-20 RX ADMIN — INSULIN LISPRO 2 UNITS: 100 INJECTION, SOLUTION INTRAVENOUS; SUBCUTANEOUS at 08:40

## 2019-02-20 RX ADMIN — VANCOMYCIN HYDROCHLORIDE 1750 MG: 1 INJECTION, POWDER, LYOPHILIZED, FOR SOLUTION INTRAVENOUS at 14:49

## 2019-02-20 ASSESSMENT — PAIN DESCRIPTION - LOCATION
LOCATION: FOOT
LOCATION: FOOT

## 2019-02-20 ASSESSMENT — PAIN DESCRIPTION - ORIENTATION
ORIENTATION: LEFT
ORIENTATION: LEFT

## 2019-02-20 ASSESSMENT — PAIN DESCRIPTION - FREQUENCY
FREQUENCY: CONTINUOUS
FREQUENCY: CONTINUOUS

## 2019-02-20 ASSESSMENT — PAIN - FUNCTIONAL ASSESSMENT: PAIN_FUNCTIONAL_ASSESSMENT: PREVENTS OR INTERFERES SOME ACTIVE ACTIVITIES AND ADLS

## 2019-02-20 ASSESSMENT — PAIN DESCRIPTION - PAIN TYPE
TYPE: ACUTE PAIN;SURGICAL PAIN
TYPE: ACUTE PAIN;SURGICAL PAIN

## 2019-02-20 ASSESSMENT — PAIN DESCRIPTION - PROGRESSION
CLINICAL_PROGRESSION: NOT CHANGED
CLINICAL_PROGRESSION: GRADUALLY WORSENING

## 2019-02-20 ASSESSMENT — PAIN SCALES - GENERAL
PAINLEVEL_OUTOF10: 4
PAINLEVEL_OUTOF10: 6
PAINLEVEL_OUTOF10: 4
PAINLEVEL_OUTOF10: 5

## 2019-02-20 ASSESSMENT — PAIN DESCRIPTION - ONSET
ONSET: ON-GOING
ONSET: ON-GOING

## 2019-02-20 ASSESSMENT — PAIN DESCRIPTION - DESCRIPTORS
DESCRIPTORS: ACHING;DISCOMFORT
DESCRIPTORS: ACHING;DISCOMFORT

## 2019-02-21 ENCOUNTER — ANESTHESIA EVENT (OUTPATIENT)
Dept: OPERATING ROOM | Age: 59
DRG: 623 | End: 2019-02-21
Payer: COMMERCIAL

## 2019-02-21 PROBLEM — A49.8 BACTERIAL INFECTION DUE TO MORGANELLA MORGANII: Status: ACTIVE | Noted: 2019-02-21

## 2019-02-21 LAB
ABSOLUTE EOS #: 0.6 K/UL (ref 0–0.4)
ABSOLUTE IMMATURE GRANULOCYTE: ABNORMAL K/UL (ref 0–0.3)
ABSOLUTE LYMPH #: 1.9 K/UL (ref 1–4.8)
ABSOLUTE MONO #: 0.7 K/UL (ref 0.2–0.8)
ANION GAP SERPL CALCULATED.3IONS-SCNC: 13 MMOL/L (ref 9–17)
BASOPHILS # BLD: 0 % (ref 0–2)
BASOPHILS ABSOLUTE: 0 K/UL (ref 0–0.2)
BUN BLDV-MCNC: 20 MG/DL (ref 6–20)
BUN/CREAT BLD: 15 (ref 9–20)
CALCIUM SERPL-MCNC: 9.1 MG/DL (ref 8.6–10.4)
CHLORIDE BLD-SCNC: 101 MMOL/L (ref 98–107)
CO2: 24 MMOL/L (ref 20–31)
CREAT SERPL-MCNC: 1.34 MG/DL (ref 0.7–1.2)
DIFFERENTIAL TYPE: ABNORMAL
EOSINOPHILS RELATIVE PERCENT: 7 % (ref 1–4)
GFR AFRICAN AMERICAN: >60 ML/MIN
GFR NON-AFRICAN AMERICAN: 55 ML/MIN
GFR SERPL CREATININE-BSD FRML MDRD: ABNORMAL ML/MIN/{1.73_M2}
GFR SERPL CREATININE-BSD FRML MDRD: ABNORMAL ML/MIN/{1.73_M2}
GLUCOSE BLD-MCNC: 278 MG/DL (ref 70–99)
GLUCOSE BLD-MCNC: 284 MG/DL (ref 75–110)
GLUCOSE BLD-MCNC: 306 MG/DL (ref 75–110)
GLUCOSE BLD-MCNC: 307 MG/DL (ref 75–110)
GLUCOSE BLD-MCNC: 318 MG/DL (ref 75–110)
HCT VFR BLD CALC: 40.8 % (ref 41–53)
HEMOGLOBIN: 13.8 G/DL (ref 13.5–17.5)
IMMATURE GRANULOCYTES: ABNORMAL %
LYMPHOCYTES # BLD: 23 % (ref 24–44)
MCH RBC QN AUTO: 30.7 PG (ref 26–34)
MCHC RBC AUTO-ENTMCNC: 33.8 G/DL (ref 31–37)
MCV RBC AUTO: 91 FL (ref 80–100)
MONOCYTES # BLD: 9 % (ref 1–7)
NRBC AUTOMATED: ABNORMAL PER 100 WBC
PDW BLD-RTO: 12.8 % (ref 11.5–14.5)
PLATELET # BLD: 315 K/UL (ref 130–400)
PLATELET ESTIMATE: ABNORMAL
PMV BLD AUTO: 6.7 FL (ref 6–12)
POTASSIUM SERPL-SCNC: 4.8 MMOL/L (ref 3.7–5.3)
RBC # BLD: 4.49 M/UL (ref 4.5–5.9)
RBC # BLD: ABNORMAL 10*6/UL
SEG NEUTROPHILS: 61 % (ref 36–66)
SEGMENTED NEUTROPHILS ABSOLUTE COUNT: 5 K/UL (ref 1.8–7.7)
SODIUM BLD-SCNC: 138 MMOL/L (ref 135–144)
WBC # BLD: 8.2 K/UL (ref 3.5–11)
WBC # BLD: ABNORMAL 10*3/UL

## 2019-02-21 PROCEDURE — 97530 THERAPEUTIC ACTIVITIES: CPT

## 2019-02-21 PROCEDURE — 36415 COLL VENOUS BLD VENIPUNCTURE: CPT

## 2019-02-21 PROCEDURE — 6370000000 HC RX 637 (ALT 250 FOR IP): Performed by: STUDENT IN AN ORGANIZED HEALTH CARE EDUCATION/TRAINING PROGRAM

## 2019-02-21 PROCEDURE — 80048 BASIC METABOLIC PNL TOTAL CA: CPT

## 2019-02-21 PROCEDURE — 1200000000 HC SEMI PRIVATE

## 2019-02-21 PROCEDURE — 6370000000 HC RX 637 (ALT 250 FOR IP): Performed by: INTERNAL MEDICINE

## 2019-02-21 PROCEDURE — 99232 SBSQ HOSP IP/OBS MODERATE 35: CPT | Performed by: INTERNAL MEDICINE

## 2019-02-21 PROCEDURE — 85025 COMPLETE CBC W/AUTO DIFF WBC: CPT

## 2019-02-21 PROCEDURE — 2500000003 HC RX 250 WO HCPCS: Performed by: INTERNAL MEDICINE

## 2019-02-21 PROCEDURE — 82947 ASSAY GLUCOSE BLOOD QUANT: CPT

## 2019-02-21 PROCEDURE — 97110 THERAPEUTIC EXERCISES: CPT

## 2019-02-21 PROCEDURE — 97116 GAIT TRAINING THERAPY: CPT

## 2019-02-21 RX ORDER — SODIUM CHLORIDE 0.9 % (FLUSH) 0.9 %
10 SYRINGE (ML) INJECTION EVERY 12 HOURS SCHEDULED
Status: CANCELLED | OUTPATIENT
Start: 2019-02-21

## 2019-02-21 RX ORDER — SODIUM CHLORIDE 0.9 % (FLUSH) 0.9 %
10 SYRINGE (ML) INJECTION PRN
Status: CANCELLED | OUTPATIENT
Start: 2019-02-21

## 2019-02-21 RX ORDER — SODIUM CHLORIDE, SODIUM LACTATE, POTASSIUM CHLORIDE, CALCIUM CHLORIDE 600; 310; 30; 20 MG/100ML; MG/100ML; MG/100ML; MG/100ML
INJECTION, SOLUTION INTRAVENOUS CONTINUOUS
Status: CANCELLED | OUTPATIENT
Start: 2019-02-21

## 2019-02-21 RX ORDER — LIDOCAINE HYDROCHLORIDE 10 MG/ML
1 INJECTION, SOLUTION EPIDURAL; INFILTRATION; INTRACAUDAL; PERINEURAL
Status: CANCELLED | OUTPATIENT
Start: 2019-02-21 | End: 2019-02-21

## 2019-02-21 RX ORDER — SODIUM CHLORIDE 9 MG/ML
INJECTION, SOLUTION INTRAVENOUS CONTINUOUS
Status: CANCELLED | OUTPATIENT
Start: 2019-02-21

## 2019-02-21 RX ADMIN — OXYCODONE AND ACETAMINOPHEN 1 TABLET: 10; 325 TABLET ORAL at 22:45

## 2019-02-21 RX ADMIN — GLIMEPIRIDE 4 MG: 2 TABLET ORAL at 08:19

## 2019-02-21 RX ADMIN — OXYCODONE AND ACETAMINOPHEN 1 TABLET: 10; 325 TABLET ORAL at 17:40

## 2019-02-21 RX ADMIN — TAZOBACTAM SODIUM AND PIPERACILLIN SODIUM 3.38 G: 375; 3 INJECTION, SOLUTION INTRAVENOUS at 18:56

## 2019-02-21 RX ADMIN — OXYCODONE AND ACETAMINOPHEN 1 TABLET: 10; 325 TABLET ORAL at 12:54

## 2019-02-21 RX ADMIN — INSULIN LISPRO 2 UNITS: 100 INJECTION, SOLUTION INTRAVENOUS; SUBCUTANEOUS at 22:45

## 2019-02-21 RX ADMIN — TAZOBACTAM SODIUM AND PIPERACILLIN SODIUM 3.38 G: 375; 3 INJECTION, SOLUTION INTRAVENOUS at 03:30

## 2019-02-21 RX ADMIN — ALLOPURINOL 300 MG: 300 TABLET ORAL at 08:19

## 2019-02-21 RX ADMIN — INSULIN LISPRO 4 UNITS: 100 INJECTION, SOLUTION INTRAVENOUS; SUBCUTANEOUS at 12:52

## 2019-02-21 RX ADMIN — GLIMEPIRIDE 4 MG: 2 TABLET ORAL at 17:37

## 2019-02-21 RX ADMIN — DAKIN'S SOLUTION 0.125% (QUARTER STRENGTH) 473 ML: 0.12 SOLUTION at 11:38

## 2019-02-21 RX ADMIN — INSULIN LISPRO 3 UNITS: 100 INJECTION, SOLUTION INTRAVENOUS; SUBCUTANEOUS at 08:19

## 2019-02-21 RX ADMIN — INSULIN LISPRO 4 UNITS: 100 INJECTION, SOLUTION INTRAVENOUS; SUBCUTANEOUS at 17:37

## 2019-02-21 RX ADMIN — TAZOBACTAM SODIUM AND PIPERACILLIN SODIUM 3.38 G: 375; 3 INJECTION, SOLUTION INTRAVENOUS at 11:35

## 2019-02-21 RX ADMIN — OXYCODONE AND ACETAMINOPHEN 1 TABLET: 10; 325 TABLET ORAL at 08:24

## 2019-02-21 ASSESSMENT — PAIN SCALES - GENERAL
PAINLEVEL_OUTOF10: 5
PAINLEVEL_OUTOF10: 5
PAINLEVEL_OUTOF10: 6
PAINLEVEL_OUTOF10: 2
PAINLEVEL_OUTOF10: 5
PAINLEVEL_OUTOF10: 5
PAINLEVEL_OUTOF10: 2

## 2019-02-21 ASSESSMENT — PAIN DESCRIPTION - DESCRIPTORS
DESCRIPTORS: ACHING;DISCOMFORT

## 2019-02-21 ASSESSMENT — PAIN DESCRIPTION - PROGRESSION
CLINICAL_PROGRESSION: NOT CHANGED

## 2019-02-21 ASSESSMENT — PAIN DESCRIPTION - ONSET
ONSET: ON-GOING

## 2019-02-21 ASSESSMENT — PAIN DESCRIPTION - FREQUENCY
FREQUENCY: CONTINUOUS

## 2019-02-21 ASSESSMENT — PAIN - FUNCTIONAL ASSESSMENT
PAIN_FUNCTIONAL_ASSESSMENT: PREVENTS OR INTERFERES SOME ACTIVE ACTIVITIES AND ADLS

## 2019-02-21 ASSESSMENT — PAIN DESCRIPTION - PAIN TYPE
TYPE: ACUTE PAIN

## 2019-02-21 ASSESSMENT — PAIN DESCRIPTION - LOCATION
LOCATION: FOOT

## 2019-02-21 ASSESSMENT — PAIN DESCRIPTION - ORIENTATION
ORIENTATION: LEFT

## 2019-02-21 ASSESSMENT — ENCOUNTER SYMPTOMS
RESPIRATORY NEGATIVE: 1
GASTROINTESTINAL NEGATIVE: 1

## 2019-02-22 ENCOUNTER — ANESTHESIA (OUTPATIENT)
Dept: OPERATING ROOM | Age: 59
DRG: 623 | End: 2019-02-22
Payer: COMMERCIAL

## 2019-02-22 VITALS — OXYGEN SATURATION: 97 % | DIASTOLIC BLOOD PRESSURE: 71 MMHG | SYSTOLIC BLOOD PRESSURE: 131 MMHG

## 2019-02-22 LAB
BUN BLDV-MCNC: 17 MG/DL (ref 6–20)
CREAT SERPL-MCNC: 1.31 MG/DL (ref 0.7–1.2)
GFR AFRICAN AMERICAN: >60 ML/MIN
GFR NON-AFRICAN AMERICAN: 56 ML/MIN
GFR SERPL CREATININE-BSD FRML MDRD: ABNORMAL ML/MIN/{1.73_M2}
GFR SERPL CREATININE-BSD FRML MDRD: ABNORMAL ML/MIN/{1.73_M2}
GLUCOSE BLD-MCNC: 177 MG/DL (ref 75–110)
GLUCOSE BLD-MCNC: 222 MG/DL (ref 75–110)
GLUCOSE BLD-MCNC: 240 MG/DL (ref 75–110)
GLUCOSE BLD-MCNC: 347 MG/DL (ref 75–110)

## 2019-02-22 PROCEDURE — 2709999900 HC NON-CHARGEABLE SUPPLY: Performed by: PODIATRIST

## 2019-02-22 PROCEDURE — 3600000012 HC SURGERY LEVEL 2 ADDTL 15MIN: Performed by: PODIATRIST

## 2019-02-22 PROCEDURE — 2580000003 HC RX 258: Performed by: ANESTHESIOLOGY

## 2019-02-22 PROCEDURE — 36415 COLL VENOUS BLD VENIPUNCTURE: CPT

## 2019-02-22 PROCEDURE — 3600000002 HC SURGERY LEVEL 2 BASE: Performed by: PODIATRIST

## 2019-02-22 PROCEDURE — 7100000000 HC PACU RECOVERY - FIRST 15 MIN: Performed by: PODIATRIST

## 2019-02-22 PROCEDURE — 99232 SBSQ HOSP IP/OBS MODERATE 35: CPT | Performed by: INTERNAL MEDICINE

## 2019-02-22 PROCEDURE — 6360000002 HC RX W HCPCS: Performed by: ANESTHESIOLOGY

## 2019-02-22 PROCEDURE — 82565 ASSAY OF CREATININE: CPT

## 2019-02-22 PROCEDURE — 82947 ASSAY GLUCOSE BLOOD QUANT: CPT

## 2019-02-22 PROCEDURE — 7100000001 HC PACU RECOVERY - ADDTL 15 MIN: Performed by: PODIATRIST

## 2019-02-22 PROCEDURE — 3700000001 HC ADD 15 MINUTES (ANESTHESIA): Performed by: PODIATRIST

## 2019-02-22 PROCEDURE — 3700000000 HC ANESTHESIA ATTENDED CARE: Performed by: PODIATRIST

## 2019-02-22 PROCEDURE — 84520 ASSAY OF UREA NITROGEN: CPT

## 2019-02-22 PROCEDURE — 6370000000 HC RX 637 (ALT 250 FOR IP): Performed by: INTERNAL MEDICINE

## 2019-02-22 PROCEDURE — 0JQR0ZZ REPAIR LEFT FOOT SUBCUTANEOUS TISSUE AND FASCIA, OPEN APPROACH: ICD-10-PCS | Performed by: FAMILY MEDICINE

## 2019-02-22 PROCEDURE — 1200000000 HC SEMI PRIVATE

## 2019-02-22 PROCEDURE — 2500000003 HC RX 250 WO HCPCS: Performed by: ANESTHESIOLOGY

## 2019-02-22 PROCEDURE — 97110 THERAPEUTIC EXERCISES: CPT

## 2019-02-22 PROCEDURE — 6370000000 HC RX 637 (ALT 250 FOR IP): Performed by: NURSE PRACTITIONER

## 2019-02-22 PROCEDURE — 2500000003 HC RX 250 WO HCPCS: Performed by: INTERNAL MEDICINE

## 2019-02-22 RX ORDER — PROPOFOL 10 MG/ML
INJECTION, EMULSION INTRAVENOUS CONTINUOUS PRN
Status: DISCONTINUED | OUTPATIENT
Start: 2019-02-22 | End: 2019-02-22 | Stop reason: SDUPTHER

## 2019-02-22 RX ORDER — SODIUM CHLORIDE 9 MG/ML
INJECTION, SOLUTION INTRAVENOUS CONTINUOUS PRN
Status: DISCONTINUED | OUTPATIENT
Start: 2019-02-22 | End: 2019-02-22 | Stop reason: SDUPTHER

## 2019-02-22 RX ORDER — FENTANYL CITRATE 50 UG/ML
25 INJECTION, SOLUTION INTRAMUSCULAR; INTRAVENOUS EVERY 5 MIN PRN
Status: DISCONTINUED | OUTPATIENT
Start: 2019-02-22 | End: 2019-02-22 | Stop reason: HOSPADM

## 2019-02-22 RX ORDER — LIDOCAINE HYDROCHLORIDE 20 MG/ML
INJECTION, SOLUTION EPIDURAL; INFILTRATION; INTRACAUDAL; PERINEURAL PRN
Status: DISCONTINUED | OUTPATIENT
Start: 2019-02-22 | End: 2019-02-22 | Stop reason: SDUPTHER

## 2019-02-22 RX ORDER — PROMETHAZINE HYDROCHLORIDE 25 MG/ML
6.25 INJECTION, SOLUTION INTRAMUSCULAR; INTRAVENOUS
Status: DISCONTINUED | OUTPATIENT
Start: 2019-02-22 | End: 2019-02-22 | Stop reason: HOSPADM

## 2019-02-22 RX ORDER — LOPERAMIDE HYDROCHLORIDE 2 MG/1
2 CAPSULE ORAL 4 TIMES DAILY PRN
Status: DISCONTINUED | OUTPATIENT
Start: 2019-02-22 | End: 2019-02-23 | Stop reason: HOSPADM

## 2019-02-22 RX ORDER — PROPOFOL 10 MG/ML
INJECTION, EMULSION INTRAVENOUS PRN
Status: DISCONTINUED | OUTPATIENT
Start: 2019-02-22 | End: 2019-02-22 | Stop reason: SDUPTHER

## 2019-02-22 RX ORDER — LABETALOL HYDROCHLORIDE 5 MG/ML
5 INJECTION, SOLUTION INTRAVENOUS EVERY 10 MIN PRN
Status: DISCONTINUED | OUTPATIENT
Start: 2019-02-22 | End: 2019-02-22 | Stop reason: HOSPADM

## 2019-02-22 RX ORDER — HYDROMORPHONE HCL 110MG/55ML
0.5 PATIENT CONTROLLED ANALGESIA SYRINGE INTRAVENOUS EVERY 5 MIN PRN
Status: DISCONTINUED | OUTPATIENT
Start: 2019-02-22 | End: 2019-02-22 | Stop reason: HOSPADM

## 2019-02-22 RX ORDER — ONDANSETRON 2 MG/ML
4 INJECTION INTRAMUSCULAR; INTRAVENOUS
Status: DISCONTINUED | OUTPATIENT
Start: 2019-02-22 | End: 2019-02-22 | Stop reason: HOSPADM

## 2019-02-22 RX ORDER — OXYCODONE HYDROCHLORIDE AND ACETAMINOPHEN 5; 325 MG/1; MG/1
2 TABLET ORAL PRN
Status: DISCONTINUED | OUTPATIENT
Start: 2019-02-22 | End: 2019-02-22 | Stop reason: HOSPADM

## 2019-02-22 RX ORDER — OXYCODONE HYDROCHLORIDE AND ACETAMINOPHEN 5; 325 MG/1; MG/1
1 TABLET ORAL PRN
Status: DISCONTINUED | OUTPATIENT
Start: 2019-02-22 | End: 2019-02-22 | Stop reason: HOSPADM

## 2019-02-22 RX ORDER — HYDRALAZINE HYDROCHLORIDE 20 MG/ML
5 INJECTION INTRAMUSCULAR; INTRAVENOUS EVERY 10 MIN PRN
Status: DISCONTINUED | OUTPATIENT
Start: 2019-02-22 | End: 2019-02-22 | Stop reason: HOSPADM

## 2019-02-22 RX ADMIN — PROPOFOL 50 MG: 10 INJECTION, EMULSION INTRAVENOUS at 15:51

## 2019-02-22 RX ADMIN — LIDOCAINE HYDROCHLORIDE 100 MG: 20 INJECTION, SOLUTION EPIDURAL; INFILTRATION; INTRACAUDAL; PERINEURAL at 15:51

## 2019-02-22 RX ADMIN — INSULIN LISPRO 2 UNITS: 100 INJECTION, SOLUTION INTRAVENOUS; SUBCUTANEOUS at 12:00

## 2019-02-22 RX ADMIN — INSULIN LISPRO 1 UNITS: 100 INJECTION, SOLUTION INTRAVENOUS; SUBCUTANEOUS at 18:11

## 2019-02-22 RX ADMIN — INSULIN LISPRO 2 UNITS: 100 INJECTION, SOLUTION INTRAVENOUS; SUBCUTANEOUS at 08:48

## 2019-02-22 RX ADMIN — LOPERAMIDE HYDROCHLORIDE 2 MG: 2 CAPSULE ORAL at 20:40

## 2019-02-22 RX ADMIN — TAZOBACTAM SODIUM AND PIPERACILLIN SODIUM 3.38 G: 375; 3 INJECTION, SOLUTION INTRAVENOUS at 02:53

## 2019-02-22 RX ADMIN — PROPOFOL 100 MCG/KG/MIN: 10 INJECTION, EMULSION INTRAVENOUS at 15:51

## 2019-02-22 RX ADMIN — OXYCODONE AND ACETAMINOPHEN 1 TABLET: 10; 325 TABLET ORAL at 23:43

## 2019-02-22 RX ADMIN — OXYCODONE AND ACETAMINOPHEN 1 TABLET: 10; 325 TABLET ORAL at 08:54

## 2019-02-22 RX ADMIN — SODIUM CHLORIDE: 9 INJECTION, SOLUTION INTRAVENOUS at 15:44

## 2019-02-22 RX ADMIN — TAZOBACTAM SODIUM AND PIPERACILLIN SODIUM 3.38 G: 375; 3 INJECTION, SOLUTION INTRAVENOUS at 11:14

## 2019-02-22 RX ADMIN — OXYCODONE AND ACETAMINOPHEN 1 TABLET: 10; 325 TABLET ORAL at 18:56

## 2019-02-22 RX ADMIN — INSULIN LISPRO 2 UNITS: 100 INJECTION, SOLUTION INTRAVENOUS; SUBCUTANEOUS at 22:13

## 2019-02-22 RX ADMIN — LORAZEPAM 0.5 MG: 1 TABLET ORAL at 20:39

## 2019-02-22 RX ADMIN — TAZOBACTAM SODIUM AND PIPERACILLIN SODIUM 3.38 G: 375; 3 INJECTION, SOLUTION INTRAVENOUS at 18:54

## 2019-02-22 ASSESSMENT — PULMONARY FUNCTION TESTS
PIF_VALUE: 0
PIF_VALUE: 2
PIF_VALUE: 0
PIF_VALUE: 1
PIF_VALUE: 0
PIF_VALUE: 1
PIF_VALUE: 0
PIF_VALUE: 1
PIF_VALUE: 0

## 2019-02-22 ASSESSMENT — ENCOUNTER SYMPTOMS
GASTROINTESTINAL NEGATIVE: 1
RESPIRATORY NEGATIVE: 1

## 2019-02-22 ASSESSMENT — PAIN DESCRIPTION - ORIENTATION
ORIENTATION: LEFT
ORIENTATION: LEFT

## 2019-02-22 ASSESSMENT — PAIN DESCRIPTION - PAIN TYPE
TYPE: ACUTE PAIN
TYPE: ACUTE PAIN

## 2019-02-22 ASSESSMENT — PAIN DESCRIPTION - PROGRESSION
CLINICAL_PROGRESSION: NOT CHANGED
CLINICAL_PROGRESSION: NOT CHANGED

## 2019-02-22 ASSESSMENT — PAIN SCALES - GENERAL
PAINLEVEL_OUTOF10: 0
PAINLEVEL_OUTOF10: 6
PAINLEVEL_OUTOF10: 0
PAINLEVEL_OUTOF10: 6
PAINLEVEL_OUTOF10: 0
PAINLEVEL_OUTOF10: 6
PAINLEVEL_OUTOF10: 0

## 2019-02-22 ASSESSMENT — PAIN DESCRIPTION - DESCRIPTORS
DESCRIPTORS: ACHING;DISCOMFORT
DESCRIPTORS: ACHING;DISCOMFORT

## 2019-02-22 ASSESSMENT — PAIN DESCRIPTION - ONSET
ONSET: ON-GOING
ONSET: ON-GOING

## 2019-02-22 ASSESSMENT — PAIN DESCRIPTION - FREQUENCY
FREQUENCY: CONTINUOUS
FREQUENCY: CONTINUOUS

## 2019-02-22 ASSESSMENT — PAIN DESCRIPTION - LOCATION
LOCATION: FOOT
LOCATION: FOOT

## 2019-02-22 ASSESSMENT — PAIN - FUNCTIONAL ASSESSMENT
PAIN_FUNCTIONAL_ASSESSMENT: PREVENTS OR INTERFERES SOME ACTIVE ACTIVITIES AND ADLS
PAIN_FUNCTIONAL_ASSESSMENT: PREVENTS OR INTERFERES SOME ACTIVE ACTIVITIES AND ADLS

## 2019-02-23 VITALS
HEIGHT: 76 IN | BODY MASS INDEX: 38.36 KG/M2 | HEART RATE: 70 BPM | SYSTOLIC BLOOD PRESSURE: 126 MMHG | WEIGHT: 315 LBS | TEMPERATURE: 98.5 F | RESPIRATION RATE: 18 BRPM | DIASTOLIC BLOOD PRESSURE: 74 MMHG | OXYGEN SATURATION: 96 %

## 2019-02-23 LAB
CULTURE: NORMAL
CULTURE: NORMAL
GLUCOSE BLD-MCNC: 232 MG/DL (ref 75–110)
Lab: NORMAL
Lab: NORMAL
SPECIMEN DESCRIPTION: NORMAL
SPECIMEN DESCRIPTION: NORMAL

## 2019-02-23 PROCEDURE — 2500000003 HC RX 250 WO HCPCS: Performed by: INTERNAL MEDICINE

## 2019-02-23 PROCEDURE — 82947 ASSAY GLUCOSE BLOOD QUANT: CPT

## 2019-02-23 PROCEDURE — 6370000000 HC RX 637 (ALT 250 FOR IP): Performed by: INTERNAL MEDICINE

## 2019-02-23 PROCEDURE — 99239 HOSP IP/OBS DSCHRG MGMT >30: CPT | Performed by: INTERNAL MEDICINE

## 2019-02-23 RX ORDER — OXYCODONE AND ACETAMINOPHEN 10; 325 MG/1; MG/1
1 TABLET ORAL EVERY 4 HOURS PRN
Qty: 30 TABLET | Refills: 0 | Status: SHIPPED | OUTPATIENT
Start: 2019-02-23 | End: 2019-03-02

## 2019-02-23 RX ORDER — OXYCODONE AND ACETAMINOPHEN 10; 325 MG/1; MG/1
1 TABLET ORAL EVERY 4 HOURS PRN
Qty: 30 TABLET | Refills: 0 | Status: SHIPPED | OUTPATIENT
Start: 2019-02-23 | End: 2019-02-23

## 2019-02-23 RX ORDER — CIPROFLOXACIN 500 MG/1
500 TABLET, FILM COATED ORAL 2 TIMES DAILY
Qty: 28 TABLET | Refills: 0 | Status: SHIPPED | OUTPATIENT
Start: 2019-02-23 | End: 2019-02-23

## 2019-02-23 RX ORDER — CIPROFLOXACIN 500 MG/1
500 TABLET, FILM COATED ORAL 2 TIMES DAILY
Qty: 28 TABLET | Refills: 0 | Status: SHIPPED | OUTPATIENT
Start: 2019-02-23 | End: 2019-03-09

## 2019-02-23 RX ORDER — AMOXICILLIN AND CLAVULANATE POTASSIUM 875; 125 MG/1; MG/1
1 TABLET, FILM COATED ORAL 2 TIMES DAILY
Qty: 28 TABLET | Refills: 0 | Status: SHIPPED | OUTPATIENT
Start: 2019-02-23 | End: 2019-03-09

## 2019-02-23 RX ORDER — AMOXICILLIN AND CLAVULANATE POTASSIUM 875; 125 MG/1; MG/1
1 TABLET, FILM COATED ORAL 2 TIMES DAILY
Qty: 28 TABLET | Refills: 0 | Status: SHIPPED | OUTPATIENT
Start: 2019-02-23 | End: 2019-02-23

## 2019-02-23 RX ADMIN — TAZOBACTAM SODIUM AND PIPERACILLIN SODIUM 3.38 G: 375; 3 INJECTION, SOLUTION INTRAVENOUS at 12:02

## 2019-02-23 RX ADMIN — GLIMEPIRIDE 4 MG: 2 TABLET ORAL at 08:21

## 2019-02-23 RX ADMIN — OXYCODONE AND ACETAMINOPHEN 1 TABLET: 10; 325 TABLET ORAL at 12:05

## 2019-02-23 RX ADMIN — INSULIN LISPRO 2 UNITS: 100 INJECTION, SOLUTION INTRAVENOUS; SUBCUTANEOUS at 08:21

## 2019-02-23 RX ADMIN — INSULIN LISPRO 4 UNITS: 100 INJECTION, SOLUTION INTRAVENOUS; SUBCUTANEOUS at 12:02

## 2019-02-23 RX ADMIN — TAZOBACTAM SODIUM AND PIPERACILLIN SODIUM 3.38 G: 375; 3 INJECTION, SOLUTION INTRAVENOUS at 02:52

## 2019-02-23 RX ADMIN — OXYCODONE AND ACETAMINOPHEN 1 TABLET: 10; 325 TABLET ORAL at 08:20

## 2019-02-23 RX ADMIN — ALLOPURINOL 300 MG: 300 TABLET ORAL at 08:21

## 2019-02-23 ASSESSMENT — PAIN SCALES - GENERAL
PAINLEVEL_OUTOF10: 6
PAINLEVEL_OUTOF10: 6

## 2019-02-24 LAB
CULTURE: ABNORMAL
DIRECT EXAM: ABNORMAL
DIRECT EXAM: ABNORMAL
Lab: ABNORMAL
SPECIMEN DESCRIPTION: ABNORMAL

## 2019-02-26 LAB — GLUCOSE BLD-MCNC: 325 MG/DL (ref 75–110)

## 2019-03-12 ENCOUNTER — HOSPITAL ENCOUNTER (OUTPATIENT)
Age: 59
Setting detail: OUTPATIENT SURGERY
Discharge: HOME | End: 2019-03-12
Attending: PODIATRIST | Admitting: PODIATRIST
Payer: COMMERCIAL

## 2019-03-12 VITALS
BODY MASS INDEX: 38.36 KG/M2 | TEMPERATURE: 97.5 F | RESPIRATION RATE: 16 BRPM | HEART RATE: 72 BPM | OXYGEN SATURATION: 94 % | WEIGHT: 315 LBS | SYSTOLIC BLOOD PRESSURE: 119 MMHG | DIASTOLIC BLOOD PRESSURE: 71 MMHG | HEIGHT: 76 IN

## 2019-03-12 PROCEDURE — 2709999900 HC NON-CHARGEABLE SUPPLY: Performed by: PODIATRIST

## 2019-03-12 PROCEDURE — 3600000012 HC SURGERY LEVEL 2 ADDTL 15MIN: Performed by: PODIATRIST

## 2019-03-12 PROCEDURE — 87070 CULTURE OTHR SPECIMN AEROBIC: CPT

## 2019-03-12 PROCEDURE — 3600000002 HC SURGERY LEVEL 2 BASE: Performed by: PODIATRIST

## 2019-03-12 PROCEDURE — C1713 ANCHOR/SCREW BN/BN,TIS/BN: HCPCS | Performed by: PODIATRIST

## 2019-03-12 PROCEDURE — 7100000011 HC PHASE II RECOVERY - ADDTL 15 MIN: Performed by: PODIATRIST

## 2019-03-12 PROCEDURE — 7100000010 HC PHASE II RECOVERY - FIRST 15 MIN: Performed by: PODIATRIST

## 2019-03-12 PROCEDURE — 87205 SMEAR GRAM STAIN: CPT

## 2019-03-12 PROCEDURE — 6370000000 HC RX 637 (ALT 250 FOR IP): Performed by: PODIATRIST

## 2019-03-12 DEVICE — THERASKIN SM 2.5X5.1CM 13SQ CM: Type: IMPLANTABLE DEVICE | Status: FUNCTIONAL

## 2019-03-12 RX ORDER — MAGNESIUM CARB/ALUMINUM HYDROX 105-160MG
TABLET,CHEWABLE ORAL PRN
Status: DISCONTINUED | OUTPATIENT
Start: 2019-03-12 | End: 2019-03-12 | Stop reason: ALTCHOICE

## 2019-03-12 ASSESSMENT — PAIN SCALES - GENERAL: PAINLEVEL_OUTOF10: 0

## 2019-03-12 ASSESSMENT — PAIN DESCRIPTION - DESCRIPTORS: DESCRIPTORS: DULL

## 2019-03-12 ASSESSMENT — PAIN - FUNCTIONAL ASSESSMENT: PAIN_FUNCTIONAL_ASSESSMENT: 0-10

## 2019-03-14 LAB
CULTURE: NORMAL
DIRECT EXAM: NORMAL
DIRECT EXAM: NORMAL
Lab: NORMAL
SPECIMEN DESCRIPTION: NORMAL

## 2019-04-03 ENCOUNTER — HOSPITAL ENCOUNTER (OUTPATIENT)
Age: 59
Setting detail: OUTPATIENT SURGERY
Discharge: HOME | End: 2019-04-03
Attending: PODIATRIST | Admitting: PODIATRIST
Payer: COMMERCIAL

## 2019-04-03 VITALS
TEMPERATURE: 97.2 F | SYSTOLIC BLOOD PRESSURE: 131 MMHG | RESPIRATION RATE: 16 BRPM | WEIGHT: 315 LBS | DIASTOLIC BLOOD PRESSURE: 68 MMHG | OXYGEN SATURATION: 94 % | HEIGHT: 76 IN | HEART RATE: 71 BPM | BODY MASS INDEX: 38.36 KG/M2

## 2019-04-03 PROCEDURE — 2709999900 HC NON-CHARGEABLE SUPPLY: Performed by: PODIATRIST

## 2019-04-03 PROCEDURE — 3600000002 HC SURGERY LEVEL 2 BASE: Performed by: PODIATRIST

## 2019-04-03 PROCEDURE — 7100000011 HC PHASE II RECOVERY - ADDTL 15 MIN: Performed by: PODIATRIST

## 2019-04-03 PROCEDURE — 7100000010 HC PHASE II RECOVERY - FIRST 15 MIN: Performed by: PODIATRIST

## 2019-04-03 PROCEDURE — 3600000012 HC SURGERY LEVEL 2 ADDTL 15MIN: Performed by: PODIATRIST

## 2019-04-03 DEVICE — THERASKIN LG 39SQ CM 5.1X7.6CM: Type: IMPLANTABLE DEVICE | Site: FOOT | Status: FUNCTIONAL

## 2019-04-03 DEVICE — EPIFIX 2X2CM 4SQ CM: Type: IMPLANTABLE DEVICE | Site: FOOT | Status: FUNCTIONAL

## 2019-04-03 RX ORDER — MINERAL OIL
OIL (ML) MISCELLANEOUS PRN
Status: DISCONTINUED | OUTPATIENT
Start: 2019-04-03 | End: 2019-04-03 | Stop reason: ALTCHOICE

## 2019-04-03 RX ORDER — INSULIN GLARGINE 100 [IU]/ML
40 INJECTION, SOLUTION SUBCUTANEOUS NIGHTLY
Status: ON HOLD | COMMUNITY
End: 2020-10-28 | Stop reason: HOSPADM

## 2019-04-03 ASSESSMENT — PAIN - FUNCTIONAL ASSESSMENT: PAIN_FUNCTIONAL_ASSESSMENT: 0-10

## 2019-04-03 ASSESSMENT — PAIN SCALES - GENERAL: PAINLEVEL_OUTOF10: 0

## 2019-04-03 NOTE — H&P
History and Physical Service   [place] [address]    HISTORY AND PHYSICAL EXAMINATION            Date of Evaluation: 4/3/2019  Patient name:  Neli Gaspar  MRN:   [id number]  YOB: 1960  PCP:    Wilbur Guevara MD    History Obtained From:     Patient, medical records    History of Present Illness: This is Neli Gaspar a 60 y.o. male who presents today for a [address] ON THE LEFT FOOT  by Dr. Stephen Collins  for TREATMENT OF NON HEALING FOOT WOUND. HE HAS DIABETES, BLOOD SUGAR HE REPORTS  THIS AM. HE HAS NEUROPATHY, HE DENIES FOOT PAIN, THE FOOT HAS A DRY  DRESSING WITH ONE SPOT OF SEROPURULENT DRAINAGE  HE PRESENTS FOR REPEAT APPLICATION OF THE THERASKIN. Nancy Boyle Past Medical History:     Past Medical History:   Diagnosis Date    Chronic kidney disease     states does not have this    [facility] [address]     Diabetic neuropathy [facility] [address] [facility]     History of kidney stones     Sleep apnea     uses Bi-Pap nightly        Past Surgical History:     Past Surgical History:   Procedure Laterality Date    APPENDECTOMY      FOOT DEBRIDEMENT Left 01/18/2016    FOOT DEBRIDEMENT Left 2/19/2019    LEFT FOOT   INCISION AND DRAINAGE WITH WOUND VAC APPLICATION performed by Nahomy Springer DPM at [facility] Left 3/12/2019    LEFT FOOT WOUND DEBRIDEMENT WITH Levander Bombard performed by Nahomy Springer DPM at [place] [address]. Left 02/19/2019    LEFT FOOT   INCISION AND DRAINAGE WITH WOUND VAC APPLICATION (Left )    FOOT SURGERY Left 2/22/2019    DELAYED PRIMARY CLOSURE LEFT FOOT      PULSEVAC performed by Nahomy Springer DPM at [place] [address]. Left 03/12/2019    wound debridement    LITHOTRIPSY  2013    NASAL SEPTUM SURGERY      SKIN SPLIT GRAFT Left 1/2016    foot        Medications Prior to Admission:     Prior to Admission medications    Medication Sig Start Date End Date Taking?  Authorizing Provider   insulin glargine (LANTUS) 100 UNIT/ML injection vial Inject 60 Units into the skin 2 times daily Patient takes 30 units in the AM and 30 units in the PM   Yes Historical Provider, MD   melatonin 5 MG TABS tablet Take 5-10 mg by mouth daily as needed (sleep)   Yes Historical Provider, MD   fluticasone (FLONASE) 50 MCG/ACT nasal spray 1-2 sprays by Each Nare route daily 2 sprays in right nostril and 1 spray in the left nostril   Yes Historical Provider, MD   Multiple Vitamins-Minerals (THERAPEUTIC MULTIVITAMIN-MINERALS) tablet Take 1 tablet by mouth daily   Yes Historical Provider, MD   gabapentin (NEURONTIN) 300 MG capsule Take 300 mg by mouth 2 times daily. .   Yes Historical Provider, MD   glipiZIDE-metformin (METAGLIP) 5-500 MG per tablet Take 1 tablet by mouth 2 times daily   Yes Historical Provider, MD   allopurinol (ZYLOPRIM) 300 MG tablet Take 300 mg by mouth daily   Yes Historical Provider, MD   zaleplon (SONATA) 10 MG capsule Take 10 mg by mouth nightly   Yes Historical Provider, MD   Handicap Placard [facility] [facility] by Does not apply route Dispense one temporary handicap placard for six months for ease of transportation 2/23/19   Martha Gomezenix, DPM   magnesium (MAGNESIUM-OXIDE) 250 MG TABS tablet Take 250 mg by mouth daily    Historical Provider, MD   Lidocaine 4 % GEL Apply topically 2 times daily as needed (hand pain) Uses before and after work    Historical Provider, MD   Liraglutide (VICTOZA) 18 MG/3ML SOPN SC injection Inject 1.8 mg into the skin daily     Historical Provider, MD   diclofenac (CATAFLAM) 50 MG tablet Take 75 mg by mouth 2 times daily    Historical Provider, MD        Allergies:     Patient has no known allergies. Social History:     Tobacco:    reports that he has never smoked. He has never used smokeless tobacco.  Alcohol:      reports that he drinks alcohol. Drug Use:  reports that he does not use drugs.     Family History:     Family History   Problem Relation Age of Onset    Heart Disease Mother    Matilde Romero Heart Disease Father        Review of Systems:     Positive and Negative as described in HPI. CONSTITUTIONAL:  negative for fevers, chills, sweats, fatigue, weight loss  HEENT:  negative for vision, hearing changes, runny nose, throat pain  RESPIRATORY:  negative for shortness of breath, cough, congestion, wheezing. HAS RAÚL USES BI-PAP NIGHTLY  CARDIOVASCULAR:  negative for chest pain, palpitations. GASTROINTESTINAL:  negative for nausea, vomiting, diarrhea, constipation, change in bowel habits, abdominal pain   GENITOURINARY:  negative for difficulty of urination, burning with urination, frequency   INTEGUMENT:  negative for rash, skin lesions, easy bruising   HEMATOLOGIC/LYMPHATIC:  negative for swelling/edema   ALLERGIC/IMMUNOLOGIC:  negative for urticaria , itching  ENDOCRINE:  HAS DIABETES, CURRENTLY TAKING INSULIN. NO  hot or cold intolerance  MUSCULOSKELETAL:  HAS ARTHRITIC  joint pains, muscle aches, swelling of joints  NEUROLOGICAL:  negative for headaches, dizziness, lightheadedness, numbness, pain, tingling extremities  BEHAVIOR/PSYCH:  negative for depression, anxiety    Physical Exam:   BP (!) 147/78   Pulse 79   Temp 97.5 °F (36.4 °C) (Oral)   Resp 18   Ht 6' 4\" (1.93 m)   Wt (!) 368 lb 6.2 oz (167.1 kg)   SpO2 96%   BMI 44.84 kg/m²   No LMP for male patient. No obstetric history on file. No results for input(s): POCGLU in the last 72 hours. General Appearance:  alert, well appearing, and in no acute distress OBESE   Mental status: oriented to person, place, and time with normal affect  Head:  normocephalic, atraumatic.   Eye: no icterus, redness, pupils equal and reactive, extraocular eye movements intact, conjunctiva clear  Ear: normal external ear, no discharge, hearing intact  Nose:  no drainage noted  Mouth: mucous membranes moist  Neck: supple, no carotid bruits, thyroid not palpable  Lungs: Bilateral equal air entry, clear to ausculation, no wheezing, rales or rhonchi, normal effort  Cardiovascular: normal rate, regular rhythm, no murmur, gallop, rub. Abdomen: Soft, nontender, nondistended, normal bowel sounds, no hepatomegaly or splenomegaly  Neurologic: There are no new focal motor or sensory deficits, normal muscle tone and bulk, no abnormal sensation, normal speech, cranial nerves II through XII grossly intact  Skin: DRESSING OF LEFT FOOT ULCER WITH SMALL AMT DRAINAGE NO lesions, rashes, bruising or bleeding on exposed skin area  Extremities:  peripheral pulses palpable, no pedal edema or calf pain with palpation  Psych: normal affect     Investigations:      Laboratory Testing:  No results found for this or any previous visit (from the past 24 hour(s)). No results for input(s): HGB, HCT, WBC, MCV, PLATELET, NA, K, CL, CO2, BUN, CREATININE, GLUCOSE, INR, PROTIME, APTT, AST, ALT, LABALBU, HCG in the last 720 hours. Imaging/Diagnostics:        Diagnosis:      1. LEFT FOOT NON-HEALING ULCER     Plans:     1.  LEFT FOOT [address] LIBBY LEHMAN CNP  4/3/2019  10:59 AM

## 2019-04-03 NOTE — OP NOTE
PODIATRY OPERATIVE NOTE     PATIENT NAME: Ryann Cole  YOB: 1960  -  60 y.o. male  MRN: [id number]  DATE: 4/3/2019  BILLING #: [id number]     Surgeon(s): Ce Brand DPM      ASSISTANTS: Rashmi Duvall DPM PGY3.      PRE-OP DIAGNOSIS:   1. Chronic non-healing ulcer left foot. 2. Diabetes mellitus type 2 with associated neuropathy.      POST-OP DIAGNOSIS: Same as above.     PROCEDURE:   1. Surgical preparation of wound bed left foot  2. Excisional debridement of ulcer left foot. 3. Application of theraskin and epifix graft.      ANESTHESIA: None.      HEMOSTASIS: Direct pressure.      ESTIMATED BLOOD LOSS: Less than 5 cc.     MATERIALS: 4-0 nylon, steri-strips, mineral oil and gauze. Implant Name Type Inv. Item Serial No.  Lot No. LRB No. Used   CULLEN-GRAFT EPIFIX 2X2CM 4SQ CM Bone/Graft/Tissue/Human/Synth CULLEN-GRAFT EPIFIX 2X2CM 4SQ CM   [employer]   Left 1   CULLEN-GRAFT THERASKIN LG 39SQ CM 5.1X7. 6CM - [id number] Bone/Graft/Tissue/Human/Synth CULLEN-GRAFT THERASKIN LG 39SQ CM 5.1X7.6CM [id number] [employer]   Left 1      INJECTABLES: none.      SPECIMEN: * No specimens in log *     COMPLICATIONS: none.      FINDINGS: ulcer #1 measures 1.5 x 2.0 x 0.7 cm. Ulcer#2 measures 2.5 x 0.4 x 0.3 cm. Ulcer #3 measures 1.0 x 0.5 x 0.3 cm. INDICATION FOR PROCEDURE  This is a 60 y.o. male that presents with a chronic ulcer of the left foot. The patient has failed conservative measures and opted to have this surgically addressed. An H&P was performed. Consent was signed and placed in the chart. The patient was made aware of all risks, benefits and alternatives to surgery. No guarantees were given or implied. OPERATIVE PROCEDURE IN DETAIL:  The patient was brought to the operating room and placed on the operating table in the supine position. No anesthesia was utilized as the patient is neuropathic. No tourniquet was utilized.  The left lower extremity was then scrubbed, prepped and draped in the usual aseptic manner. Attention was directed to the left foot there were three ulcers present on the left first interspace extending to the first metatarsal head plantarly. There was noted skin bridges between the ulcers. The ulcers measured as follows. ulcer #1 measures 1.5 x 2.0 x 0.7 cm. Ulcer#2 measures 2.5 x 0.4 x 0.3 cm. Ulcer #3 measures 1.0 x 0.5 x 0.3 cm. A curette was used to excisionally debride all three ulcers of any fibrotic tissue and decrease the bioburden on the wound. Adequate bleeding noted that was controlled with direct pressure. The site was irrigated with copious amounts of normal saline. There was a noted void/soft tissue defect in the proximal aspect of ulcer#1 so a 2x2 epifix was placed in the area. Next a section of the theraskin graft was placed over ulcer #1 and secured into place using 4-0 nylon. Next a section of the theraskin graft was placed over ulcer #2 and secured into place using 4-0 nylon. Next a section of the theraskin graft was placed over ulcer #3 and secured into place using 4-0 nylon. An adaptic was placed over all of the theraskin grafts and secured into place using steristrips. The surgical site was then dressed with mineral oil, 4x4 gauze, kerlix, and an ace bandage. The patient tolerated procedure well. Patient was transferred to the PACU with vital signs stable and neurovascular status intact. The patient was given all postoperative instructions prior to the procedure and was instructed to follow up with Dr. Fabrice Merchant within 1 week of surgery.     Qiana Bradley DPM  Podiatry Resident, PGY-3  [address], [place]

## 2019-04-18 ENCOUNTER — HOSPITAL ENCOUNTER (OUTPATIENT)
Age: 59
Setting detail: OUTPATIENT SURGERY
Discharge: HOME | End: 2019-04-18
Attending: PODIATRIST | Admitting: PODIATRIST
Payer: COMMERCIAL

## 2019-04-18 VITALS
OXYGEN SATURATION: 94 % | DIASTOLIC BLOOD PRESSURE: 64 MMHG | HEART RATE: 63 BPM | SYSTOLIC BLOOD PRESSURE: 121 MMHG | BODY MASS INDEX: 38.36 KG/M2 | HEIGHT: 76 IN | TEMPERATURE: 96.8 F | RESPIRATION RATE: 16 BRPM | WEIGHT: 315 LBS

## 2019-04-18 PROCEDURE — 7100000011 HC PHASE II RECOVERY - ADDTL 15 MIN: Performed by: PODIATRIST

## 2019-04-18 PROCEDURE — 2709999900 HC NON-CHARGEABLE SUPPLY: Performed by: PODIATRIST

## 2019-04-18 PROCEDURE — 3600000002 HC SURGERY LEVEL 2 BASE: Performed by: PODIATRIST

## 2019-04-18 PROCEDURE — 3600000012 HC SURGERY LEVEL 2 ADDTL 15MIN: Performed by: PODIATRIST

## 2019-04-18 PROCEDURE — 7100000010 HC PHASE II RECOVERY - FIRST 15 MIN: Performed by: PODIATRIST

## 2019-04-18 DEVICE — GRAFT HUM TISS NEO L7.5IN FORESKIN PROC APLIGRAF: Type: IMPLANTABLE DEVICE | Site: FOOT | Status: FUNCTIONAL

## 2019-04-18 RX ORDER — MINERAL OIL
OIL (ML) MISCELLANEOUS PRN
Status: DISCONTINUED | OUTPATIENT
Start: 2019-04-18 | End: 2019-04-18 | Stop reason: ALTCHOICE

## 2019-04-18 ASSESSMENT — PAIN - FUNCTIONAL ASSESSMENT: PAIN_FUNCTIONAL_ASSESSMENT: 0-10

## 2019-04-18 ASSESSMENT — PAIN SCALES - GENERAL: PAINLEVEL_OUTOF10: 0

## 2019-04-18 NOTE — H&P
History and Physical Update    Pt Name: Elizabeth Pascual  MRN: [id number]  [place]: 1960  Date of evaluation: 4/18/2019      [x] I have reviewed the H&P BY ME ON 4/3/19   which meets the criteria for an Interval History and Physical note COPIED BELOW. THE PATIENT PRESENTS TODAY FOR AN APPLIGRAFT  APPLICATION TO THE LEFT FOOT. [x] I have examined  Elizabeth Pascual  There are no changes to the patient who is scheduled for a left foot appligraft. The patient denies health changes, fever, chills, productive cough, SOB, chest pain, open sores or wounds. HE IS ALERT AND ORIENTED. HE REPORTS A BLOOD SUGAR  TODAY. Vital signs: /61   Pulse 73   Temp 98.4 °F (36.9 °C)   Resp 18   Ht 6' 4\" (1.93 m)   Wt (!) 367 lb 1.6 oz (166.5 kg)   SpO2 96%   BMI 44.68 kg/m²     Allergies:  Patient has no known allergies. Medications:    Prior to Admission medications    Medication Sig Start Date End Date Taking? Authorizing Provider   insulin glargine (LANTUS) 100 UNIT/ML injection vial Inject 60 Units into the skin 2 times daily Patient takes 30 units in the AM and 30 units in the PM   Yes Historical Provider, MD   melatonin 5 MG TABS tablet Take 5-10 mg by mouth daily as needed (sleep)   Yes Historical Provider, MD   fluticasone (FLONASE) 50 MCG/ACT nasal spray 1-2 sprays by Each Nare route daily 2 sprays in right nostril and 1 spray in the left nostril   Yes Historical Provider, MD   Multiple Vitamins-Minerals (THERAPEUTIC MULTIVITAMIN-MINERALS) tablet Take 1 tablet by mouth daily   Yes Historical Provider, MD   magnesium (MAGNESIUM-OXIDE) 250 MG TABS tablet Take 250 mg by mouth daily   Yes Historical Provider, MD   Lidocaine 4 % GEL Apply topically 2 times daily as needed (hand pain) Uses before and after work   Yes Historical Provider, MD   gabapentin (NEURONTIN) 300 MG capsule Take 300 mg by mouth 2 times daily. .   Yes Historical Provider, MD   allopurinol (ZYLOPRIM) 300 MG tablet Take 300 mg by mouth daily   Yes Historical Provider, MD   zaleplon (SONATA) 10 MG capsule Take 10 mg by mouth nightly   Yes Historical Provider, MD   Handicap Placard MISC by Does not apply route Dispense one temporary handicap placard for six months for ease of transportation 2/23/19   Lesli Zambrano DPM   glipiZIDE-metformin (METAGLIP) 5-500 MG per tablet Take 1 tablet by mouth 2 times daily    Historical Provider, MD   Liraglutide (VICTOZA) 18 MG/3ML SOPN SC injection Inject 1.8 mg into the skin daily     Historical Provider, MD   diclofenac (CATAFLAM) 50 MG tablet Take 75 mg by mouth 2 times daily    Historical Provider, MD       This is a 60 y.o. male who is pleasant, cooperative, alert and oriented x3, in no acute distress. Heart: Heart sounds are normal.  HR regular rate and rhythm without murmur, gallop or rub. Lungs: Normal respiratory effort with good air exchange, unlabored and clear to auscultation without wheezes or rales bilaterally   Abdomen: soft, nontender, nondistended with bowel sounds   LEFT FOOT DRESSING DRY AND INTACT. PEDAL PULSES + 2 RIGHT , + 1 LEFT . Labs:  No results for input(s): HGB, HCT, WBC, MCV, PLT, NA, K, CL, CO2, BUN, CREATININE, GLUCOSE, INR, PROTIME, APTT, AST, ALT, LABALBU, HCG in the last 720 hours.     [address]  APRN, ANP-BC  Electronically signed 4/18/2019 at 12:27 PM              3/2019 10:59 AM          Related encounter: Admission (Discharged) from 4/3/2019 in [address] [place] [address] All Collapse All           Show:Clear all  [x]Manual[x]Template[]Copied    Added by:  [x]Linn Nam, LIBBY - CNP      []Diego for details      History and Physical Service   [address]            Date of Evaluation:     4/3/2019  Patient name:              Cherelle Palacios  MRN:                           [id number]  YOB: 1960  PCP:                            Laura Sauceda MD History Obtained From:      Patient, medical records     History of Present Illness: This is Denise Freitas a 60 y.o. male who presents today for a [address] ON THE LEFT FOOT  by Dr. Juan Dhillon  for TREATMENT OF NON HEALING FOOT WOUND. HE HAS DIABETES, BLOOD SUGAR HE REPORTS  THIS AM. HE HAS NEUROPATHY, HE DENIES FOOT PAIN, THE FOOT HAS A DRY  DRESSING WITH ONE SPOT OF SEROPURULENT DRAINAGE  HE PRESENTS FOR REPEAT APPLICATION OF THE THERASKIN. Jennifer English Past Medical History:      Past Medical History        Past Medical History:   Diagnosis Date    Chronic kidney disease       states does not have this    Diabetes mellitus ([address]      Diabetic neuropathy ([facility] [address]      History of kidney stones      Sleep apnea       uses Bi-Pap nightly            Past Surgical History:      Past Surgical History         Past Surgical History:   Procedure Laterality Date    APPENDECTOMY        FOOT DEBRIDEMENT Left 01/18/2016    FOOT DEBRIDEMENT Left 2/19/2019     LEFT FOOT   INCISION AND DRAINAGE WITH WOUND VAC APPLICATION [facility] Latisha Foster DPM at [facility] Left 3/12/2019     LEFT FOOT WOUND DEBRIDEMENT WITH Gregery Horse performed by Latisha Foster DPM at [address] Left 02/19/2019     LEFT FOOT   INCISION AND DRAINAGE WITH WOUND VAC APPLICATION (Left )    FOOT SURGERY Left 2/22/2019     DELAYED PRIMARY CLOSURE LEFT FOOT      PULSEVAC performed by Latisha Foster DPM at [address] Left 03/12/2019     wound debridement    LITHOTRIPSY   2013    NASAL SEPTUM SURGERY        SKIN SPLIT GRAFT Left 1/2016     foot            Medications Prior to Admission:      Home Medications           Prior to Admission medications    Medication Sig Start Date End Date Taking?  Authorizing Provider   insulin glargine (LANTUS) 100 UNIT/ML injection vial Inject 60 Units into the skin 2 times daily Patient takes 30 units in the AM and 30 units in the PM Yes Historical Provider, MD   melatonin 5 MG TABS tablet Take 5-10 mg by mouth daily as needed (sleep)     Yes Historical Provider, MD   fluticasone (FLONASE) 50 MCG/ACT nasal spray 1-2 sprays by Each Nare route daily 2 sprays in right nostril and 1 spray in the left nostril     Yes Historical Provider, MD   Multiple Vitamins-Minerals (THERAPEUTIC MULTIVITAMIN-MINERALS) tablet Take 1 tablet by mouth daily     Yes Historical Provider, MD   gabapentin (NEURONTIN) 300 MG capsule Take 300 mg by mouth 2 times daily. .     Yes Historical Provider, MD   glipiZIDE-metformin (METAGLIP) 5-500 MG per tablet Take 1 tablet by mouth 2 times daily     Yes Historical Provider, MD   allopurinol (ZYLOPRIM) 300 MG tablet Take 300 mg by mouth daily     Yes Historical Provider, MD   zaleplon (SONATA) 10 MG capsule Take 10 mg by mouth nightly     Yes Historical Provider, MD   Handicap Placard [address] [facility] by Does not apply route Dispense one temporary handicap placard for six months for ease of transportation 19     Susan Driscoll DPM   magnesium (MAGNESIUM-OXIDE) 250 MG TABS tablet Take 250 mg by mouth daily       Historical Provider, MD   Lidocaine 4 % GEL Apply topically 2 times daily as needed (hand pain) Uses before and after work       Historical Provider, MD   Liraglutide (VICTOZA) 18 MG/3ML SOPN SC injection Inject 1.8 mg into the skin daily        Historical Provider, MD   diclofenac (CATAFLAM) 50 MG tablet Take 75 mg by mouth 2 times daily       Historical Provider, MD            Allergies:      Patient has no known allergies. Social History:      Tobacco:    reports that he has never smoked. He has never used smokeless tobacco.  Alcohol:      reports that he drinks alcohol. Drug Use:  reports that he does not use drugs.      Family History:      Family History         Family History   Problem Relation Age of Onset    Heart Disease Mother      Heart Disease Father              Review of Systems:      Positive and Negative as described in HPI. CONSTITUTIONAL:  negative for fevers, chills, sweats, fatigue, weight loss  HEENT:  negative for vision, hearing changes, runny nose, throat pain  RESPIRATORY:  negative for shortness of breath, cough, congestion, wheezing. HAS RAÚL USES BI-PAP NIGHTLY  CARDIOVASCULAR:  negative for chest pain, palpitations. GASTROINTESTINAL:  negative for nausea, vomiting, diarrhea, constipation, change in bowel habits, abdominal pain   GENITOURINARY:  negative for difficulty of urination, burning with urination, frequency   INTEGUMENT:  negative for rash, skin lesions, easy bruising   HEMATOLOGIC/LYMPHATIC:  negative for swelling/edema   ALLERGIC/IMMUNOLOGIC:  negative for urticaria , itching  ENDOCRINE:  HAS DIABETES, CURRENTLY TAKING INSULIN. NO  hot or cold intolerance  MUSCULOSKELETAL:  HAS ARTHRITIC  joint pains, muscle aches, swelling of joints  NEUROLOGICAL:  negative for headaches, dizziness, lightheadedness, numbness, pain, tingling extremities  BEHAVIOR/PSYCH:  negative for depression, anxiety     Physical Exam:   BP (!) 147/78   Pulse 79   Temp 97.5 °F (36.4 °C) (Oral)   Resp 18   Ht 6' 4\" (1.93 m)   Wt (!) 368 lb 6.2 oz (167.1 kg)   SpO2 96%   BMI 44.84 kg/m²   No LMP for male patient. No obstetric history on file. No results for input(s): POCGLU in the last 72 hours. General Appearance:  alert, well appearing, and in no acute distress OBESE   Mental status: oriented to person, place, and time with normal affect  Head:  normocephalic, atraumatic.   Eye: no icterus, redness, pupils equal and reactive, extraocular eye movements intact, conjunctiva clear  Ear: normal external ear, no discharge, hearing intact  Nose:  no drainage noted  Mouth: mucous membranes moist  Neck: supple, no carotid bruits, thyroid not palpable  Lungs: Bilateral equal air entry, clear to ausculation, no wheezing, rales or rhonchi, normal effort  Cardiovascular: normal rate, regular rhythm, no murmur, gallop, rub. Abdomen: Soft, nontender, nondistended, normal bowel sounds, no hepatomegaly or splenomegaly  Neurologic: There are no new focal motor or sensory deficits, normal muscle tone and bulk, no abnormal sensation, normal speech, cranial nerves II through XII grossly intact  Skin: DRESSING OF LEFT FOOT ULCER WITH SMALL AMT DRAINAGE NO lesions, rashes, bruising or bleeding on exposed skin area  Extremities:  peripheral pulses palpable, no pedal edema or calf pain with palpation  Psych: normal affect      Investigations:       Laboratory Testing:  Recent Results   No results found for this or any previous visit (from the past 24 hour(s)). No results for input(s): HGB, HCT, WBC, MCV, PLATELET, NA, K, CL, CO2, BUN, CREATININE, GLUCOSE, INR, PROTIME, APTT, AST, ALT, LABALBU, HCG in the last 720 hours. Imaging/Diagnostics:           Diagnosis:       1. LEFT FOOT NON-HEALING ULCER      Plans:      1. LEFT FOOT [address] LIBBY COON CNP  4/3/2019  10:59 AM                Cosigned by:  Dinora Romero DPM at 4/11/2019 12:59 PM

## 2019-04-18 NOTE — BRIEF OP NOTE
PODIATRY BRIEF OP NOTE    PATIENT NAME: Jeri Christopher  YOB: 1960  -  60 y.o. male  MRN: [id number]  DATE: 4/18/2019  BILLING #: [id number]    Surgeon(s): Wolf Harp DPM     ASSISTANTS: Min Ponce DPM    PRE-OP DIAGNOSIS:   1. DM WITH NEUROPATHY  2. CHRONIC Left foot wound    POST-OP DIAGNOSIS: Same as above. PROCEDURE:   1. Debridement of left foot wound  2. Surgical preparation of wound bed, left foot  3. Application of 44 units of Apligraf biologic graft, left foot    ANESTHESIA: None. HEMOSTASIS: Direct pressure    ESTIMATED BLOOD LOSS: Less than 5cc. MATERIALS:   Implant Name Type Inv. Item Serial No.  Lot No. LRB No. Used   CULLEN-TISS SKIN SUBST APLIGRAF PER STND 44 SQ CM UNIT Bone/Graft/Tissue/Human/Synth CULLEN-TISS SKIN SUBST APLIGRAF PER STND 44 SQ CM UNIT  [employer] [id number] Left 1       INJECTABLES: None. SPECIMEN:   * No specimens in log *    COMPLICATIONS: None. FINDINGS: Dorsal wound measured 0.6cm x 0.3cm x 0.2cm. Plantar wound measured 1.4cm x 1.0cm x 1.9cm.     Min Ponce DPM   Podiatric Medicine & Surgery   4/18/2019 at 1:37 PM

## 2019-04-18 NOTE — OP NOTE
PATIENT NAME: Jose Perez  YOB: 1960  -  60 y.o. male  MRN: [id number]  DATE: 4/18/2019  BILLING #: [id number]     Surgeon(s): Abigail Hinson DPM      ASSISTANTS: Ilene Knox DPM     PRE-OP DIAGNOSIS:   1. DM WITH NEURO   2. CHRONIC Left foot wound     POST-OP DIAGNOSIS: Same as above.     PROCEDURE:   1. Debridement of left foot wound  2. Surgical preparation of wound bed, left foot  3. Application of 44 units of Apligraf biologic graft, left foot     ANESTHESIA: None.     HEMOSTASIS: Direct pressure     ESTIMATED BLOOD LOSS: Less than 5cc.     MATERIALS:   Implant Name Type Inv. Item Serial No.  Lot No. LRB No. Used   CULLEN-TISS SKIN SUBST APLIGRAF PER STND 44 SQ CM UNIT Bone/Graft/Tissue/Human/Synth CULLEN-TISS SKIN SUBST APLIGRAF PER STND 44 SQ CM UNIT   [employer] [id number] Left 1         INJECTABLES: None.     SPECIMEN:   * No specimens in log *     COMPLICATIONS: None.     FINDINGS: Dorsal wound measured 0.6cm x 0.3cm x 0.2cm. Plantar wound measured 1.4cm x 1.0cm x 1.9cm. INDICATION FOR PROCEDURE:  The patient has had a chronic wound on the left foot for some time. The patient has previously tried several conservative therapies which yielded limited success. Surgical intervention is indicated at this time to treat infection and establish healthy viable tissue for optimal healing. All risks, benefits, and alternatives to the procedure were explained to the patient who voiced understanding. No guarantees were given or implied. A consent was signed and placed in the chart. PROCEDURE IN DETAIL:  Patient was transferred to the operating room and placed on the table in the supine position. The left foot was scrubbed, prepped, and draped in the usual aseptic manner. Following a timeout:  Attention was directed to the plantar foot wound. Using a curette the wound bed was debrided until a healthy bleeding granular base was noted.  The wound was measured to be 1.4cm x 1.0cm x1.9cm. Next attention was directed to the dorsal wound which was debrided with a curette until a healthy bleeding granular base was noted. The wound was measured to be 0.6cm x 0.3cm x 0.2cm. At this time 44 units of Apligraf were placed into the dorsal and plantar wounds. The plantar graft was sutured into position using 4-0 nylon suture. The wound was dressed with adaptic, steri strips, mineral oil on an eye pad and a dry sterile dressing consisting of 4x4 gauze, kerlix, and an ACE wrap. Patient tolerated the procedure and the anesthesia well and was transferred to the recovery room with vital signs stable. Following a period of post-operative monitoring the patient will be transferred back to the floor with neurovascular status intact.      Electronically signed by Elsi Acosta DPM on 4/18/2019 at 1:44 PM

## 2019-05-07 ENCOUNTER — HOSPITAL ENCOUNTER (OUTPATIENT)
Age: 59
Setting detail: OUTPATIENT SURGERY
Discharge: HOME | End: 2019-05-07
Attending: PODIATRIST | Admitting: PODIATRIST
Payer: COMMERCIAL

## 2019-05-07 VITALS
HEIGHT: 76 IN | RESPIRATION RATE: 15 BRPM | OXYGEN SATURATION: 96 % | DIASTOLIC BLOOD PRESSURE: 70 MMHG | HEART RATE: 66 BPM | TEMPERATURE: 97.3 F | SYSTOLIC BLOOD PRESSURE: 145 MMHG | BODY MASS INDEX: 38.36 KG/M2 | WEIGHT: 315 LBS

## 2019-05-07 PROCEDURE — 3600000002 HC SURGERY LEVEL 2 BASE: Performed by: PODIATRIST

## 2019-05-07 PROCEDURE — 7100000010 HC PHASE II RECOVERY - FIRST 15 MIN: Performed by: PODIATRIST

## 2019-05-07 PROCEDURE — 3600000012 HC SURGERY LEVEL 2 ADDTL 15MIN: Performed by: PODIATRIST

## 2019-05-07 PROCEDURE — 2709999900 HC NON-CHARGEABLE SUPPLY: Performed by: PODIATRIST

## 2019-05-07 PROCEDURE — 7100000011 HC PHASE II RECOVERY - ADDTL 15 MIN: Performed by: PODIATRIST

## 2019-05-07 DEVICE — EPIFIX 2X4CM 8SQ CM: Type: IMPLANTABLE DEVICE | Site: FOOT | Status: FUNCTIONAL

## 2019-05-07 RX ORDER — MINERAL OIL
OIL (ML) MISCELLANEOUS PRN
Status: DISCONTINUED | OUTPATIENT
Start: 2019-05-07 | End: 2019-05-07 | Stop reason: ALTCHOICE

## 2019-05-07 ASSESSMENT — PAIN - FUNCTIONAL ASSESSMENT: PAIN_FUNCTIONAL_ASSESSMENT: 0-10

## 2019-05-07 ASSESSMENT — PAIN SCALES - GENERAL: PAINLEVEL_OUTOF10: 0

## 2019-05-07 NOTE — H&P
History and Physical Service   [employer]    HISTORY AND PHYSICAL EXAMINATION            Date of Evaluation: 5/7/2019  Patient name:  Jose Perez  MRN:   [id number]  YOB: 1960  PCP:    Ricardo Dee MD    History Obtained From:     Patient, medical records    History of Present Illness: This is Jose Perez a 60 y.o. male who presents today for a left foot appligraft by Dr. Laura Kunz. He has had a chronic non healing left foot diabetic foot wound. The wound began this past February when he developed a crack in the skin of the left foot that progressed to a skin infection and than the non healing diabetic wound. He denies any known history of having any MRSA infections. He also has diabetic neuropathy as well as sleep apnea.      Past Medical History:     Past Medical History:   Diagnosis Date    Chronic kidney disease     states does not have this    Diabetes mellitus ([address]     Diabetic neuropathy ([address]     History of kidney stones     Sleep apnea     uses Bi-Pap nightly        Past Surgical History:     Past Surgical History:   Procedure Laterality Date    APPENDECTOMY      FOOT DEBRIDEMENT Left 01/18/2016    FOOT DEBRIDEMENT Left 2/19/2019    LEFT FOOT   INCISION AND DRAINAGE WITH WOUND VAC APPLICATION performed by Abigail Hinson DPM at [address] [place] [address] Left 3/12/2019    LEFT FOOT WOUND DEBRIDEMENT WITH Fabiene Jenise performed by Abigail Hinson DPM at [address] [place] [address] Left 31/17/0734    Application of 44 units of Apligraf biologic graft, left foot    FOOT SURGERY Left 02/19/2019    LEFT FOOT   INCISION AND DRAINAGE WITH WOUND VAC APPLICATION (Left )    FOOT SURGERY Left 2/22/2019    DELAYED PRIMARY CLOSURE LEFT FOOT      PULSEVAC performed by Abigail Hinson DPM at [address] [place] [address] Left 03/12/2019    wound debridement    LITHOTRIPSY  2013    NASAL SEPTUM SURGERY      OTHER SURGICAL HISTORY Left 04/03/2019    foot debridement and Historical Provider, MD   diclofenac (CATAFLAM) 50 MG tablet Take 75 mg by mouth 2 times daily    Historical Provider, MD        Allergies:     Patient has no known allergies. Social History:     Tobacco:    reports that he has never smoked. He has never used smokeless tobacco.  Alcohol:      reports that he drinks alcohol. Drug Use:  reports that he does not use drugs. Family History:     Family History   Problem Relation Age of Onset    Heart Disease Mother     Heart Disease Father        Review of Systems:     Positive and Negative as described in HPI. CONSTITUTIONAL:  negative for fevers, chills, sweats, fatigue, weight loss  HEENT:  negative for vision, hearing changes, runny nose, throat pain  RESPIRATORY:  negative for shortness of breath, cough, congestion, wheezing. CARDIOVASCULAR:  negative for chest pain, palpitations. GASTROINTESTINAL:  negative for nausea, vomiting, diarrhea, constipation, change in bowel habits, abdominal pain   GENITOURINARY:  negative for difficulty of urination, burning with urination, frequency   INTEGUMENT:  Chronic left foot wound. negative for rash, skin lesions, easy bruising   HEMATOLOGIC/LYMPHATIC:  negative for swelling/edema   ALLERGIC/IMMUNOLOGIC:  negative for urticaria , itching  ENDOCRINE:  negative increase in drinking, increase in urination, hot or cold intolerance  MUSCULOSKELETAL:  negative joint pains, muscle aches, swelling of joints  NEUROLOGICAL:  negative for headaches, dizziness, lightheadedness, numbness, pain, tingling extremities  BEHAVIOR/PSYCH:  negative for depression, anxiety    Physical Exam:   BP (!) 156/78   Pulse 76   Temp 98.1 °F (36.7 °C) (Oral)   Resp 20   Ht 6' 4\" (1.93 m)   Wt (!) 371 lb 11.2 oz (168.6 kg)   SpO2 94%   BMI 45.24 kg/m²       General Appearance:  alert, well appearing, and in no acute distress  Mental status: oriented to person, place, and time with normal affect  Head:  normocephalic, atraumatic.   Eye: no icterus, redness, pupils equal and reactive, extraocular eye movements intact, conjunctiva clear  Ear: normal external ear, no discharge, hearing intact  Nose:  no drainage noted  Mouth: mucous membranes moist  Neck: supple, no carotid bruits, thyroid not palpable  Lungs: Bilateral equal air entry, clear to ausculation, no wheezing, rales or rhonchi, normal effort  Cardiovascular: normal rate, regular rhythm, no murmur, gallop, rub. Abdomen: Soft, nontender, nondistended, normal bowel sounds, no hepatomegaly or splenomegaly  Neurologic: There are no new focal motor or sensory deficits, normal muscle tone and bulk, no abnormal sensation, normal speech, cranial nerves II through XII grossly intact  Skin: No gross lesions, rashes, bruising or bleeding on exposed skin area  Extremities:  Left foot dressing dry and intact.  peripheral pulses palpable, no pedal edema or calf pain with palpation  Psych: normal affect         Diagnosis:    Chronic left foot wound      LIBBY Aviles CNP  5/7/2019  12:41 PM

## 2019-05-07 NOTE — OP NOTE
dry sterile dressing consisting of 4x4 gauze, kerlix, and an ACE wrap.     Patient tolerated the procedure and the anesthesia well and was transferred to the recovery room with vital signs stable. Following a period of post-operative monitoring the patient will be discharged to follow up with Dr. Fabrice Merchant in 1 week.      Electronically signed by Megan Harvey DPM on 5/7/2019 at 3:28 PM

## 2019-05-07 NOTE — BRIEF OP NOTE
PODIATRY BRIEF OP NOTE    PATIENT NAME: Cherelle Palacios  YOB: 1960  -  60 y.o. male  MRN: [id number]  DATE: 5/7/2019  BILLING #: [id number]    Surgeon(s): Kelechi Richards DPM     ASSISTANTS: Dustin Shin DPM    PRE-OP DIAGNOSIS:   1. Chronic Left foot wound  2. Diabetes Mellitus type 2 with peripheral neuropathy    POST-OP DIAGNOSIS: Same as above. PROCEDURE:   1. Debridement of left foot wound  2. Surgical preparation of wound bed, left foot  3. Application of 2x4 epifixf biologic graft, left foot    ANESTHESIA: None    HEMOSTASIS: tamponade    ESTIMATED BLOOD LOSS: Less than 5cc. MATERIALS:   * No implants in log *    INJECTABLES: none    SPECIMEN: None  * No specimens in log *    COMPLICATIONS: None    FINDINGS: healthy granular wound base post debridement, no purulence expressed, wound base measured approximately 1.5 x 1.2 x 1.0 cm post debridement.      Dustin Shin DPM   Podiatric Medicine & Surgery   5/7/2019 at 11:19 AM

## 2020-10-19 ENCOUNTER — APPOINTMENT (OUTPATIENT)
Dept: GENERAL RADIOLOGY | Age: 60
DRG: 854 | End: 2020-10-19
Payer: COMMERCIAL

## 2020-10-19 ENCOUNTER — HOSPITAL ENCOUNTER (INPATIENT)
Age: 60
LOS: 10 days | Discharge: HOME HEALTH | DRG: 854 | End: 2020-10-29
Attending: EMERGENCY MEDICINE | Admitting: INTERNAL MEDICINE
Payer: COMMERCIAL

## 2020-10-19 PROBLEM — L97.529: Status: ACTIVE | Noted: 2020-10-19

## 2020-10-19 LAB
ABSOLUTE EOS #: 0.29 K/UL (ref 0–0.44)
ABSOLUTE IMMATURE GRANULOCYTE: 0.08 K/UL (ref 0–0.3)
ABSOLUTE LYMPH #: 1.35 K/UL (ref 1.1–3.7)
ABSOLUTE MONO #: 1.09 K/UL (ref 0.1–1.2)
ANION GAP SERPL CALCULATED.3IONS-SCNC: 12 MMOL/L (ref 9–17)
BASOPHILS # BLD: 0 % (ref 0–2)
BASOPHILS ABSOLUTE: 0.04 K/UL (ref 0–0.2)
BUN BLDV-MCNC: 30 MG/DL (ref 8–23)
BUN/CREAT BLD: 20 (ref 9–20)
C-REACTIVE PROTEIN: 101.1 MG/L (ref 0–5)
CALCIUM SERPL-MCNC: 9.4 MG/DL (ref 8.6–10.4)
CHLORIDE BLD-SCNC: 98 MMOL/L (ref 98–107)
CO2: 23 MMOL/L (ref 20–31)
CREAT SERPL-MCNC: 1.51 MG/DL (ref 0.7–1.2)
DIFFERENTIAL TYPE: ABNORMAL
EOSINOPHILS RELATIVE PERCENT: 3 % (ref 1–4)
GFR AFRICAN AMERICAN: 57 ML/MIN
GFR NON-AFRICAN AMERICAN: 47 ML/MIN
GFR SERPL CREATININE-BSD FRML MDRD: ABNORMAL ML/MIN/{1.73_M2}
GFR SERPL CREATININE-BSD FRML MDRD: ABNORMAL ML/MIN/{1.73_M2}
GLUCOSE BLD-MCNC: 224 MG/DL (ref 75–110)
GLUCOSE BLD-MCNC: 280 MG/DL (ref 70–99)
HCT VFR BLD CALC: 40.3 % (ref 40.7–50.3)
HEMOGLOBIN: 13 G/DL (ref 13–17)
IMMATURE GRANULOCYTES: 1 %
LYMPHOCYTES # BLD: 12 % (ref 24–43)
MCH RBC QN AUTO: 30.3 PG (ref 25.2–33.5)
MCHC RBC AUTO-ENTMCNC: 32.3 G/DL (ref 28.4–34.8)
MCV RBC AUTO: 93.9 FL (ref 82.6–102.9)
MONOCYTES # BLD: 10 % (ref 3–12)
NRBC AUTOMATED: 0 PER 100 WBC
PDW BLD-RTO: 12 % (ref 11.8–14.4)
PLATELET # BLD: 300 K/UL (ref 138–453)
PLATELET ESTIMATE: ABNORMAL
PMV BLD AUTO: 8.9 FL (ref 8.1–13.5)
POTASSIUM SERPL-SCNC: 4.4 MMOL/L (ref 3.7–5.3)
RBC # BLD: 4.29 M/UL (ref 4.21–5.77)
RBC # BLD: ABNORMAL 10*6/UL
SARS-COV-2, RAPID: NOT DETECTED
SARS-COV-2: NORMAL
SARS-COV-2: NORMAL
SEDIMENTATION RATE, ERYTHROCYTE: 86 MM (ref 0–20)
SEG NEUTROPHILS: 74 % (ref 36–65)
SEGMENTED NEUTROPHILS ABSOLUTE COUNT: 8.2 K/UL (ref 1.5–8.1)
SODIUM BLD-SCNC: 133 MMOL/L (ref 135–144)
SOURCE: NORMAL
WBC # BLD: 11.1 K/UL (ref 3.5–11.3)
WBC # BLD: ABNORMAL 10*3/UL

## 2020-10-19 PROCEDURE — 96375 TX/PRO/DX INJ NEW DRUG ADDON: CPT

## 2020-10-19 PROCEDURE — 80048 BASIC METABOLIC PNL TOTAL CA: CPT

## 2020-10-19 PROCEDURE — 82947 ASSAY GLUCOSE BLOOD QUANT: CPT

## 2020-10-19 PROCEDURE — U0002 COVID-19 LAB TEST NON-CDC: HCPCS

## 2020-10-19 PROCEDURE — 2580000003 HC RX 258: Performed by: NURSE PRACTITIONER

## 2020-10-19 PROCEDURE — 87077 CULTURE AEROBIC IDENTIFY: CPT

## 2020-10-19 PROCEDURE — 6370000000 HC RX 637 (ALT 250 FOR IP): Performed by: NURSE PRACTITIONER

## 2020-10-19 PROCEDURE — 6360000002 HC RX W HCPCS: Performed by: NURSE PRACTITIONER

## 2020-10-19 PROCEDURE — 87186 SC STD MICRODIL/AGAR DIL: CPT

## 2020-10-19 PROCEDURE — 87076 CULTURE ANAEROBE IDENT EACH: CPT

## 2020-10-19 PROCEDURE — 87205 SMEAR GRAM STAIN: CPT

## 2020-10-19 PROCEDURE — 87075 CULTR BACTERIA EXCEPT BLOOD: CPT

## 2020-10-19 PROCEDURE — 1200000000 HC SEMI PRIVATE

## 2020-10-19 PROCEDURE — 86140 C-REACTIVE PROTEIN: CPT

## 2020-10-19 PROCEDURE — 85025 COMPLETE CBC W/AUTO DIFF WBC: CPT

## 2020-10-19 PROCEDURE — 73630 X-RAY EXAM OF FOOT: CPT

## 2020-10-19 PROCEDURE — 87070 CULTURE OTHR SPECIMN AEROBIC: CPT

## 2020-10-19 PROCEDURE — 99222 1ST HOSP IP/OBS MODERATE 55: CPT | Performed by: NURSE PRACTITIONER

## 2020-10-19 PROCEDURE — 96374 THER/PROPH/DIAG INJ IV PUSH: CPT

## 2020-10-19 PROCEDURE — 99285 EMERGENCY DEPT VISIT HI MDM: CPT

## 2020-10-19 PROCEDURE — 85652 RBC SED RATE AUTOMATED: CPT

## 2020-10-19 RX ORDER — DEXTROSE MONOHYDRATE 25 G/50ML
12.5 INJECTION, SOLUTION INTRAVENOUS PRN
Status: DISCONTINUED | OUTPATIENT
Start: 2020-10-19 | End: 2020-10-29 | Stop reason: HOSPADM

## 2020-10-19 RX ORDER — ZALEPLON 10 MG/1
10 CAPSULE ORAL NIGHTLY PRN
Status: DISCONTINUED | OUTPATIENT
Start: 2020-10-19 | End: 2020-10-20 | Stop reason: CLARIF

## 2020-10-19 RX ORDER — LINEZOLID 2 MG/ML
600 INJECTION, SOLUTION INTRAVENOUS EVERY 12 HOURS
Status: DISCONTINUED | OUTPATIENT
Start: 2020-10-19 | End: 2020-10-19 | Stop reason: SDUPTHER

## 2020-10-19 RX ORDER — ONDANSETRON 2 MG/ML
4 INJECTION INTRAMUSCULAR; INTRAVENOUS EVERY 6 HOURS PRN
Status: DISCONTINUED | OUTPATIENT
Start: 2020-10-19 | End: 2020-10-29 | Stop reason: HOSPADM

## 2020-10-19 RX ORDER — POTASSIUM CHLORIDE 20 MEQ/1
40 TABLET, EXTENDED RELEASE ORAL PRN
Status: DISCONTINUED | OUTPATIENT
Start: 2020-10-19 | End: 2020-10-29 | Stop reason: HOSPADM

## 2020-10-19 RX ORDER — SODIUM CHLORIDE 0.9 % (FLUSH) 0.9 %
10 SYRINGE (ML) INJECTION PRN
Status: DISCONTINUED | OUTPATIENT
Start: 2020-10-19 | End: 2020-10-29 | Stop reason: HOSPADM

## 2020-10-19 RX ORDER — PROMETHAZINE HYDROCHLORIDE 12.5 MG/1
12.5 TABLET ORAL EVERY 6 HOURS PRN
Status: DISCONTINUED | OUTPATIENT
Start: 2020-10-19 | End: 2020-10-29 | Stop reason: HOSPADM

## 2020-10-19 RX ORDER — LORAZEPAM 0.5 MG/1
0.5 TABLET ORAL NIGHTLY
Status: DISCONTINUED | OUTPATIENT
Start: 2020-10-19 | End: 2020-10-19 | Stop reason: ALTCHOICE

## 2020-10-19 RX ORDER — ACETAMINOPHEN 325 MG/1
650 TABLET ORAL EVERY 6 HOURS PRN
Status: DISCONTINUED | OUTPATIENT
Start: 2020-10-19 | End: 2020-10-29 | Stop reason: HOSPADM

## 2020-10-19 RX ORDER — DEXTROSE MONOHYDRATE 50 MG/ML
100 INJECTION, SOLUTION INTRAVENOUS PRN
Status: DISCONTINUED | OUTPATIENT
Start: 2020-10-19 | End: 2020-10-29 | Stop reason: HOSPADM

## 2020-10-19 RX ORDER — POLYETHYLENE GLYCOL 3350 17 G/17G
17 POWDER, FOR SOLUTION ORAL DAILY PRN
Status: DISCONTINUED | OUTPATIENT
Start: 2020-10-19 | End: 2020-10-29 | Stop reason: HOSPADM

## 2020-10-19 RX ORDER — NICOTINE POLACRILEX 4 MG
15 LOZENGE BUCCAL PRN
Status: DISCONTINUED | OUTPATIENT
Start: 2020-10-19 | End: 2020-10-29 | Stop reason: HOSPADM

## 2020-10-19 RX ORDER — SODIUM CHLORIDE 9 MG/ML
INJECTION, SOLUTION INTRAVENOUS CONTINUOUS
Status: DISCONTINUED | OUTPATIENT
Start: 2020-10-19 | End: 2020-10-20

## 2020-10-19 RX ORDER — SODIUM CHLORIDE 0.9 % (FLUSH) 0.9 %
10 SYRINGE (ML) INJECTION EVERY 12 HOURS SCHEDULED
Status: DISCONTINUED | OUTPATIENT
Start: 2020-10-19 | End: 2020-10-29 | Stop reason: HOSPADM

## 2020-10-19 RX ORDER — M-VIT,TX,IRON,MINS/CALC/FOLIC 27MG-0.4MG
1 TABLET ORAL DAILY
Status: DISCONTINUED | OUTPATIENT
Start: 2020-10-20 | End: 2020-10-29 | Stop reason: HOSPADM

## 2020-10-19 RX ORDER — HEPARIN SODIUM 5000 [USP'U]/ML
5000 INJECTION, SOLUTION INTRAVENOUS; SUBCUTANEOUS EVERY 8 HOURS SCHEDULED
Status: DISCONTINUED | OUTPATIENT
Start: 2020-10-19 | End: 2020-10-27

## 2020-10-19 RX ORDER — NICOTINE 21 MG/24HR
1 PATCH, TRANSDERMAL 24 HOURS TRANSDERMAL DAILY PRN
Status: DISCONTINUED | OUTPATIENT
Start: 2020-10-19 | End: 2020-10-29 | Stop reason: HOSPADM

## 2020-10-19 RX ORDER — GABAPENTIN 300 MG/1
300 CAPSULE ORAL 2 TIMES DAILY
Status: DISCONTINUED | OUTPATIENT
Start: 2020-10-19 | End: 2020-10-29 | Stop reason: HOSPADM

## 2020-10-19 RX ORDER — MORPHINE SULFATE 2 MG/ML
2 INJECTION, SOLUTION INTRAMUSCULAR; INTRAVENOUS EVERY 4 HOURS PRN
Status: DISCONTINUED | OUTPATIENT
Start: 2020-10-19 | End: 2020-10-29 | Stop reason: HOSPADM

## 2020-10-19 RX ORDER — DEXTROSE AND SODIUM CHLORIDE 5; .45 G/100ML; G/100ML
INJECTION, SOLUTION INTRAVENOUS CONTINUOUS
Status: DISCONTINUED | OUTPATIENT
Start: 2020-10-20 | End: 2020-10-19

## 2020-10-19 RX ORDER — MAGNESIUM SULFATE 1 G/100ML
1 INJECTION INTRAVENOUS PRN
Status: DISCONTINUED | OUTPATIENT
Start: 2020-10-19 | End: 2020-10-29 | Stop reason: HOSPADM

## 2020-10-19 RX ORDER — INSULIN GLARGINE 100 [IU]/ML
30 INJECTION, SOLUTION SUBCUTANEOUS 2 TIMES DAILY
Status: DISCONTINUED | OUTPATIENT
Start: 2020-10-19 | End: 2020-10-22

## 2020-10-19 RX ORDER — MORPHINE SULFATE 2 MG/ML
2 INJECTION, SOLUTION INTRAMUSCULAR; INTRAVENOUS ONCE
Status: COMPLETED | OUTPATIENT
Start: 2020-10-19 | End: 2020-10-19

## 2020-10-19 RX ORDER — LINEZOLID 2 MG/ML
600 INJECTION, SOLUTION INTRAVENOUS EVERY 12 HOURS
Status: DISCONTINUED | OUTPATIENT
Start: 2020-10-20 | End: 2020-10-23

## 2020-10-19 RX ORDER — POTASSIUM CHLORIDE 7.45 MG/ML
10 INJECTION INTRAVENOUS PRN
Status: DISCONTINUED | OUTPATIENT
Start: 2020-10-19 | End: 2020-10-29 | Stop reason: HOSPADM

## 2020-10-19 RX ORDER — ACETAMINOPHEN 650 MG/1
650 SUPPOSITORY RECTAL EVERY 6 HOURS PRN
Status: DISCONTINUED | OUTPATIENT
Start: 2020-10-19 | End: 2020-10-29 | Stop reason: HOSPADM

## 2020-10-19 RX ORDER — ALLOPURINOL 300 MG/1
300 TABLET ORAL DAILY
Status: DISCONTINUED | OUTPATIENT
Start: 2020-10-20 | End: 2020-10-29 | Stop reason: HOSPADM

## 2020-10-19 RX ORDER — LANOLIN ALCOHOL/MO/W.PET/CERES
6 CREAM (GRAM) TOPICAL DAILY PRN
Status: DISCONTINUED | OUTPATIENT
Start: 2020-10-19 | End: 2020-10-29 | Stop reason: HOSPADM

## 2020-10-19 RX ADMIN — LINEZOLID 600 MG: 600 INJECTION, SOLUTION INTRAVENOUS at 21:00

## 2020-10-19 RX ADMIN — HEPARIN SODIUM 5000 UNITS: 5000 INJECTION INTRAVENOUS; SUBCUTANEOUS at 23:58

## 2020-10-19 RX ADMIN — INSULIN LISPRO 1 UNITS: 100 INJECTION, SOLUTION INTRAVENOUS; SUBCUTANEOUS at 23:57

## 2020-10-19 RX ADMIN — CEFEPIME HYDROCHLORIDE 2 G: 2 INJECTION, POWDER, FOR SOLUTION INTRAVENOUS at 20:28

## 2020-10-19 RX ADMIN — Medication 2 MG: at 23:58

## 2020-10-19 RX ADMIN — MORPHINE SULFATE 2 MG: 2 INJECTION, SOLUTION INTRAMUSCULAR; INTRAVENOUS at 18:42

## 2020-10-19 ASSESSMENT — PAIN DESCRIPTION - LOCATION
LOCATION: FOOT
LOCATION: FOOT

## 2020-10-19 ASSESSMENT — ENCOUNTER SYMPTOMS
SINUS PRESSURE: 0
VOMITING: 0
SHORTNESS OF BREATH: 0
COUGH: 0
NAUSEA: 0
COLOR CHANGE: 1

## 2020-10-19 ASSESSMENT — PAIN DESCRIPTION - PAIN TYPE
TYPE: ACUTE PAIN
TYPE: ACUTE PAIN

## 2020-10-19 ASSESSMENT — PAIN SCALES - GENERAL
PAINLEVEL_OUTOF10: 0
PAINLEVEL_OUTOF10: 7

## 2020-10-19 ASSESSMENT — PAIN DESCRIPTION - DESCRIPTORS
DESCRIPTORS: POUNDING;THROBBING
DESCRIPTORS: POUNDING;THROBBING

## 2020-10-19 ASSESSMENT — PAIN DESCRIPTION - PROGRESSION: CLINICAL_PROGRESSION: NOT CHANGED

## 2020-10-19 ASSESSMENT — PAIN DESCRIPTION - FREQUENCY: FREQUENCY: CONTINUOUS

## 2020-10-19 ASSESSMENT — PAIN DESCRIPTION - ONSET: ONSET: ON-GOING

## 2020-10-19 ASSESSMENT — PAIN DESCRIPTION - ORIENTATION
ORIENTATION: LEFT
ORIENTATION: LEFT

## 2020-10-19 ASSESSMENT — PAIN - FUNCTIONAL ASSESSMENT: PAIN_FUNCTIONAL_ASSESSMENT: PREVENTS OR INTERFERES SOME ACTIVE ACTIVITIES AND ADLS

## 2020-10-19 NOTE — ED PROVIDER NOTES
FOOT DEBRIDEMENT Left 3/12/2019    LEFT FOOT WOUND DEBRIDEMENT WITH THERASKIN performed by Mp Petty DPM at [address] Left 56/54/5154    Application of 44 units of Apligraf biologic graft, left foot    FOOT SURGERY Left 02/19/2019    LEFT FOOT   INCISION AND DRAINAGE WITH WOUND VAC APPLICATION (Left )    FOOT SURGERY Left 2/22/2019    DELAYED PRIMARY CLOSURE LEFT FOOT      PULSEVAC performed by Mp Petty DPM at [address] Left 03/12/2019    wound debridement    LITHOTRIPSY  2013    NASAL SEPTUM SURGERY      OTHER SURGICAL HISTORY Left 04/03/2019    foot debridement and eppifix application    SKIN GRAFT Left 4/3/2019    THERASKIN AND EPIFIX APPLICATION  AND  LEFT FOOT WOUND DEBRIDEMENT AND WOUND PREP performed by Mp Petty DPM at [address] [place] [address] Left 4/18/2019    LEFT FOOT APPLICATION APPLIGRAFT performed by Mp Petty DPM at [address] [place] [address] Left 7/6/0154    GRAFT APPLICATION LEFT FOOT - WCB performed by Mp Petty DPM at [facility] Left 1/2016    foot     CURRENT MEDICATIONS       Previous Medications    ALLOPURINOL (ZYLOPRIM) 300 MG TABLET    Take 300 mg by mouth daily    DICLOFENAC (CATAFLAM) 50 MG TABLET    Take 75 mg by mouth 2 times daily    FLUTICASONE (FLONASE) 50 MCG/ACT NASAL SPRAY    1-2 sprays by Each Nare route daily 2 sprays in right nostril and 1 spray in the left nostril    GABAPENTIN (NEURONTIN) 300 MG CAPSULE    Take 300 mg by mouth 2 times daily. Pina Smith     GLIPIZIDE-METFORMIN (METAGLIP) 5-500 MG PER TABLET    Take 1 tablet by mouth 2 times daily    HANDICAP PLACARD MISC    by Does not apply route Dispense one temporary handicap placard for six months for ease of transportation    INSULIN GLARGINE (LANTUS) 100 UNIT/ML INJECTION VIAL    Inject 60 Units into the skin 2 times daily Patient takes 30 units in the AM and 30 units in the PM    LIDOCAINE 4 % GEL    Apply topically 2 times daily as needed (hand pain) Uses before and after work    LIRAGLUTIDE (VICTOZA) 18 MG/3ML SOPN SC INJECTION    Inject 1.8 mg into the skin daily     MAGNESIUM (MAGNESIUM-OXIDE) 250 MG TABS TABLET    Take 250 mg by mouth daily    MELATONIN 5 MG TABS TABLET    Take 5-10 mg by mouth daily as needed (sleep)    MULTIPLE VITAMINS-MINERALS (THERAPEUTIC MULTIVITAMIN-MINERALS) TABLET    Take 1 tablet by mouth daily    ZALEPLON (SONATA) 10 MG CAPSULE    Take 10 mg by mouth nightly     ALLERGIES     has No Known Allergies. FAMILY HISTORY     He indicated that his mother is . He indicated that his father is . SOCIAL HISTORY       Social History     Tobacco Use    Smoking status: Never Smoker    Smokeless tobacco: Never Used   Substance Use Topics    Alcohol use: Yes     Alcohol/week: 0.0 standard drinks     Comment: 1-2 cans beer daily    Drug use: No     PHYSICAL EXAM     INITIAL VITALS: /68   Pulse 91   Temp 99.3 °F (37.4 °C) (Oral)   Resp 16   Ht 6' 4\" (1.93 m)   Wt (!) 375 lb (170.1 kg)   SpO2 98%   BMI 45.65 kg/m²    Physical Exam  Constitutional:       Appearance: Normal appearance. HENT:      Right Ear: External ear normal.      Left Ear: External ear normal.   Eyes:      General:         Right eye: No discharge. Left eye: No discharge. Conjunctiva/sclera: Conjunctivae normal.   Cardiovascular:      Rate and Rhythm: Normal rate and regular rhythm. Pulses: Normal pulses. Pulmonary:      Effort: Pulmonary effort is normal.      Breath sounds: Normal breath sounds. Musculoskeletal: Normal range of motion. General: Swelling and tenderness present. Skin:     General: Skin is warm and dry. Capillary Refill: Capillary refill takes less than 2 seconds. Comments: Swelling to the left great toe with erythema. Plantar surface is excoriated. Small open wound to the plantar aspect measuring less than 1 cm. Neurological:      General: No focal deficit present.       Mental Status: He is alert and oriented to person, place, and time. Psychiatric:         Mood and Affect: Mood normal.         Thought Content: Thought content normal.         DIAGNOSTIC RESULTS     RADIOLOGY:All plain film, CT, MRI, and formal ultrasound images (except ED bedside ultrasound) are read by the radiologist, see reports below, unless otherwise noted in MDM or here. Interpretation per the Radiologist below, if available at the time of this note:    XR FOOT LEFT (MIN 3 VIEWS)   Final Result   Abnormality of the base of the 1st proximal phalanx. This appearance could   be caused by chronic infectious process or fracture. MRI may be helpful for   better characterization             LABS:  Labs Reviewed   CBC WITH AUTO DIFFERENTIAL - Abnormal; Notable for the following components:       Result Value    Hematocrit 40.3 (*)     Seg Neutrophils 74 (*)     Lymphocytes 12 (*)     Immature Granulocytes 1 (*)     Segs Absolute 8.20 (*)     All other components within normal limits   BASIC METABOLIC PANEL - Abnormal; Notable for the following components:    Glucose 280 (*)     BUN 30 (*)     CREATININE 1.51 (*)     Sodium 133 (*)     GFR Non- 47 (*)     GFR  57 (*)     All other components within normal limits   SEDIMENTATION RATE - Abnormal; Notable for the following components:    Sed Rate 86 (*)     All other components within normal limits   CULTURE, ANAEROBIC AND AEROBIC   C-REACTIVE PROTEIN   COVID-19       All other labs were within normal range or not returned as of this dictation. EMERGENCY DEPARTMENT COURSE and DIFFERENTIAL DIAGNOSIS/MDM:   Vitals:    Vitals:    10/19/20 1616 10/19/20 1619   BP:  106/68   Pulse:  91   Resp:  16   Temp:  99.3 °F (37.4 °C)   TempSrc: Oral Oral   SpO2:  98%   Weight: (!) 375 lb (170.1 kg)    Height: 6' 4\" (1.93 m)        Medical Decision Makin-year-old male who is afebrile and nontoxic in appearance. He has nonhealing wound to the foot.   Sed rate is 86.  He has a normal white count. X-rays as above. He was evaluated by the podiatry resident. He will require admission and IV antibiotics. Patient was agreeable to this plan. CONSULTS:  IP CONSULT TO INTERNAL MEDICINE    PROCEDURES:  None    The patient was given the following meds in the ED:  Orders Placed This Encounter   Medications    morphine (PF) injection 2 mg    cefepime (MAXIPIME) 2 g IVPB minibag    linezolid (ZYVOX) IVPB 600 mg     Order Specific Question:   Antimicrobial Indications     Answer:   Skin and Soft Tissue Infection       FINAL IMPRESSION      1. Diabetic ulcer of left foot associated with type 2 diabetes mellitus, with other ulcer severity, unspecified part of foot ([facility]    2. Cellulitis of left lower extremity          DISPOSITION/PLAN   DISPOSITION Decision To Admit 10/19/2020 08:05:40 PM      PATIENT REFERRED TO:   No follow-up provider specified.     DISCHARGE MEDICATIONS:     New Prescriptions    No medications on file       CRITICAL CARE TIME       Please note that portions of this note were completed with a voice recognition program.      Claudia WONG [id number], APRN - CNP  10/19/20 2031

## 2020-10-20 ENCOUNTER — APPOINTMENT (OUTPATIENT)
Dept: MRI IMAGING | Age: 60
DRG: 854 | End: 2020-10-20
Payer: COMMERCIAL

## 2020-10-20 LAB
ANION GAP SERPL CALCULATED.3IONS-SCNC: 10 MMOL/L (ref 9–17)
BUN BLDV-MCNC: 23 MG/DL (ref 8–23)
BUN/CREAT BLD: 17 (ref 9–20)
CALCIUM SERPL-MCNC: 9.6 MG/DL (ref 8.6–10.4)
CHLORIDE BLD-SCNC: 103 MMOL/L (ref 98–107)
CO2: 24 MMOL/L (ref 20–31)
CREAT SERPL-MCNC: 1.38 MG/DL (ref 0.7–1.2)
GFR AFRICAN AMERICAN: >60 ML/MIN
GFR NON-AFRICAN AMERICAN: 52 ML/MIN
GFR SERPL CREATININE-BSD FRML MDRD: ABNORMAL ML/MIN/{1.73_M2}
GFR SERPL CREATININE-BSD FRML MDRD: ABNORMAL ML/MIN/{1.73_M2}
GLUCOSE BLD-MCNC: 164 MG/DL (ref 75–110)
GLUCOSE BLD-MCNC: 186 MG/DL (ref 70–99)
GLUCOSE BLD-MCNC: 238 MG/DL (ref 75–110)
GLUCOSE BLD-MCNC: 260 MG/DL (ref 75–110)
GLUCOSE BLD-MCNC: 312 MG/DL (ref 75–110)
HCT VFR BLD CALC: 41 % (ref 40.7–50.3)
HEMOGLOBIN: 13 G/DL (ref 13–17)
INR BLD: 1.1
MCH RBC QN AUTO: 29.8 PG (ref 25.2–33.5)
MCHC RBC AUTO-ENTMCNC: 31.7 G/DL (ref 28.4–34.8)
MCV RBC AUTO: 94 FL (ref 82.6–102.9)
NRBC AUTOMATED: 0 PER 100 WBC
PDW BLD-RTO: 11.9 % (ref 11.8–14.4)
PLATELET # BLD: 281 K/UL (ref 138–453)
PMV BLD AUTO: 10.5 FL (ref 8.1–13.5)
POTASSIUM SERPL-SCNC: 4.4 MMOL/L (ref 3.7–5.3)
PROTHROMBIN TIME: 13.9 SEC (ref 11.5–14.2)
RBC # BLD: 4.36 M/UL (ref 4.21–5.77)
SODIUM BLD-SCNC: 137 MMOL/L (ref 135–144)
WBC # BLD: 10 K/UL (ref 3.5–11.3)

## 2020-10-20 PROCEDURE — 99232 SBSQ HOSP IP/OBS MODERATE 35: CPT | Performed by: INTERNAL MEDICINE

## 2020-10-20 PROCEDURE — 6370000000 HC RX 637 (ALT 250 FOR IP): Performed by: NURSE PRACTITIONER

## 2020-10-20 PROCEDURE — 6360000002 HC RX W HCPCS: Performed by: NURSE PRACTITIONER

## 2020-10-20 PROCEDURE — 73718 MRI LOWER EXTREMITY W/O DYE: CPT

## 2020-10-20 PROCEDURE — 97166 OT EVAL MOD COMPLEX 45 MIN: CPT

## 2020-10-20 PROCEDURE — 82947 ASSAY GLUCOSE BLOOD QUANT: CPT

## 2020-10-20 PROCEDURE — 85610 PROTHROMBIN TIME: CPT

## 2020-10-20 PROCEDURE — 36415 COLL VENOUS BLD VENIPUNCTURE: CPT

## 2020-10-20 PROCEDURE — 2580000003 HC RX 258: Performed by: NURSE PRACTITIONER

## 2020-10-20 PROCEDURE — 1200000000 HC SEMI PRIVATE

## 2020-10-20 PROCEDURE — 97161 PT EVAL LOW COMPLEX 20 MIN: CPT

## 2020-10-20 PROCEDURE — 93923 UPR/LXTR ART STDY 3+ LVLS: CPT

## 2020-10-20 PROCEDURE — 80048 BASIC METABOLIC PNL TOTAL CA: CPT

## 2020-10-20 PROCEDURE — 85027 COMPLETE CBC AUTOMATED: CPT

## 2020-10-20 RX ORDER — LORAZEPAM 0.5 MG/1
0.5 TABLET ORAL NIGHTLY PRN
Status: DISCONTINUED | OUTPATIENT
Start: 2020-10-20 | End: 2020-10-29 | Stop reason: HOSPADM

## 2020-10-20 RX ADMIN — ALLOPURINOL 300 MG: 300 TABLET ORAL at 08:49

## 2020-10-20 RX ADMIN — MULTIPLE VITAMINS W/ MINERALS TAB 1 TABLET: TAB at 08:49

## 2020-10-20 RX ADMIN — INSULIN GLARGINE 30 UNITS: 100 INJECTION, SOLUTION SUBCUTANEOUS at 21:51

## 2020-10-20 RX ADMIN — LINEZOLID 600 MG: 600 INJECTION, SOLUTION INTRAVENOUS at 21:50

## 2020-10-20 RX ADMIN — SODIUM CHLORIDE: 9 INJECTION, SOLUTION INTRAVENOUS at 00:03

## 2020-10-20 RX ADMIN — HEPARIN SODIUM 5000 UNITS: 5000 INJECTION INTRAVENOUS; SUBCUTANEOUS at 21:51

## 2020-10-20 RX ADMIN — HEPARIN SODIUM 5000 UNITS: 5000 INJECTION INTRAVENOUS; SUBCUTANEOUS at 06:00

## 2020-10-20 RX ADMIN — HEPARIN SODIUM 5000 UNITS: 5000 INJECTION INTRAVENOUS; SUBCUTANEOUS at 12:55

## 2020-10-20 RX ADMIN — INSULIN LISPRO 4 UNITS: 100 INJECTION, SOLUTION INTRAVENOUS; SUBCUTANEOUS at 21:50

## 2020-10-20 RX ADMIN — INSULIN GLARGINE 30 UNITS: 100 INJECTION, SOLUTION SUBCUTANEOUS at 00:17

## 2020-10-20 RX ADMIN — LORAZEPAM 0.5 MG: 0.5 TABLET ORAL at 22:29

## 2020-10-20 RX ADMIN — LINEZOLID 600 MG: 600 INJECTION, SOLUTION INTRAVENOUS at 10:00

## 2020-10-20 RX ADMIN — MAGNESIUM GLUCONATE 500 MG ORAL TABLET 400 MG: 500 TABLET ORAL at 08:49

## 2020-10-20 RX ADMIN — INSULIN LISPRO 2 UNITS: 100 INJECTION, SOLUTION INTRAVENOUS; SUBCUTANEOUS at 08:49

## 2020-10-20 RX ADMIN — INSULIN LISPRO 4 UNITS: 100 INJECTION, SOLUTION INTRAVENOUS; SUBCUTANEOUS at 18:05

## 2020-10-20 RX ADMIN — INSULIN GLARGINE 30 UNITS: 100 INJECTION, SOLUTION SUBCUTANEOUS at 08:49

## 2020-10-20 RX ADMIN — CEFEPIME HYDROCHLORIDE 2 G: 2 INJECTION, POWDER, FOR SOLUTION INTRAVENOUS at 19:29

## 2020-10-20 RX ADMIN — Medication 2 MG: at 22:14

## 2020-10-20 RX ADMIN — Medication 2 MG: at 06:08

## 2020-10-20 RX ADMIN — INSULIN LISPRO 6 UNITS: 100 INJECTION, SOLUTION INTRAVENOUS; SUBCUTANEOUS at 12:55

## 2020-10-20 RX ADMIN — Medication 2 MG: at 18:09

## 2020-10-20 RX ADMIN — LORAZEPAM 0.5 MG: 0.5 TABLET ORAL at 00:17

## 2020-10-20 RX ADMIN — GABAPENTIN 300 MG: 300 CAPSULE ORAL at 08:49

## 2020-10-20 RX ADMIN — GABAPENTIN 300 MG: 300 CAPSULE ORAL at 00:17

## 2020-10-20 RX ADMIN — GABAPENTIN 300 MG: 300 CAPSULE ORAL at 21:51

## 2020-10-20 RX ADMIN — CEFEPIME HYDROCHLORIDE 2 G: 2 INJECTION, POWDER, FOR SOLUTION INTRAVENOUS at 08:49

## 2020-10-20 ASSESSMENT — PAIN DESCRIPTION - FREQUENCY
FREQUENCY: CONTINUOUS
FREQUENCY: CONTINUOUS

## 2020-10-20 ASSESSMENT — ENCOUNTER SYMPTOMS
NAUSEA: 0
COLOR CHANGE: 1
CONSTIPATION: 0
BLOOD IN STOOL: 0
ABDOMINAL PAIN: 0
WHEEZING: 0
COUGH: 0
CHEST TIGHTNESS: 0
SHORTNESS OF BREATH: 0
VOMITING: 0
DIARRHEA: 0

## 2020-10-20 ASSESSMENT — PAIN DESCRIPTION - ONSET
ONSET: ON-GOING
ONSET: ON-GOING

## 2020-10-20 ASSESSMENT — PAIN SCALES - GENERAL
PAINLEVEL_OUTOF10: 0
PAINLEVEL_OUTOF10: 8
PAINLEVEL_OUTOF10: 7
PAINLEVEL_OUTOF10: 7
PAINLEVEL_OUTOF10: 9
PAINLEVEL_OUTOF10: 3

## 2020-10-20 ASSESSMENT — PAIN DESCRIPTION - PROGRESSION
CLINICAL_PROGRESSION: NOT CHANGED
CLINICAL_PROGRESSION: NOT CHANGED

## 2020-10-20 ASSESSMENT — PAIN DESCRIPTION - ORIENTATION
ORIENTATION: LEFT
ORIENTATION: LEFT

## 2020-10-20 ASSESSMENT — PAIN DESCRIPTION - DESCRIPTORS
DESCRIPTORS: THROBBING;POUNDING
DESCRIPTORS: THROBBING;POUNDING

## 2020-10-20 ASSESSMENT — PAIN DESCRIPTION - PAIN TYPE
TYPE: ACUTE PAIN
TYPE: ACUTE PAIN

## 2020-10-20 ASSESSMENT — PAIN DESCRIPTION - LOCATION
LOCATION: FOOT
LOCATION: FOOT

## 2020-10-20 NOTE — CARE COORDINATION
Case Management Initial Discharge Plan  Barahona Crew,             Met with:patient to discuss discharge plans. Information verified: address, contacts, phone number, , insurance Yes  PCP: Bryan Howard MD  Date of last visit:     Insurance Provider: [facility]    Discharge Planning    Living Arrangements:  Alone   Support Systems:  [id number] has 1 stories  2 stairs to climb to get into front door, 0 stairs to climb to reach second floor  Location of bedroom/bathroom in home  - main floor    Patient able to perform ADL's:Independent    Current Services (outpatient & in home) none  DME equipment: glucometer, BIPAP  DME provider: N/A    Pharmacy: [facility] in [address] [place] [address] Needed:  Home Care    Patient agreeable to home care: No  [place] of choice provided:  n/a    Prior SNF/Rehab Placement and Facility: No  Agreeable to SNF/Rehab: No  [place] of choice provided: n/a   Evaluation: n/a    Expected Discharge date:  10/21/20  Patient expects to be discharged to:  Home  Follow Up Appointment: Best Day/ Time:      Transportation provider: drove self  Transportation arrangements needed for discharge: No    Readmission Risk              Risk of Unplanned Readmission:        9             Does patient have a readmission risk score greater than 14?: No  If yes, follow-up appointment must be made within 7 days of discharge. Goal of Care:       Discharge Plan: Met with patient at bedside. Lives alone, independent and works full time. Admitted for non-healing diabetic ulcer lt foot that started last Tuesday. Has been on PO Cipro and has been having more swelling and pain. Podiatry consulted and currently on IV Cefepime and Zyvox. States has had lt foot wound off and on for years and follows with Dr. Heraclio Paul monthly. Has surgical shoe in place. Had MRI lt foot today.   Has been doing own wound care and will continue to follow podiatries plan of care.        Electronically signed by Chelsea Rowley RN on 10/20/20 at 11:22 AM EDT

## 2020-10-20 NOTE — CONSULTS
Deandre Murrell      Name: Minoo Andujar  MRN: [id number]     Acct: [de-identified]  Room: 2021/2021-01    Admit Date: 10/19/2020  PCP: Marizol Singletary MD    Physician Requesting Consult: Tammy Langford DPM    Reason for Consult: Noncompressible vessels    Chief Complaint:     Chief Complaint   Patient presents with    Cellulitis     left foot         History Obtained From:     patient    History of Present Illness:      Minoo Andujar is a  63 y.o.  male who presents with Cellulitis (left foot)      This is a 74-year-old male with diabetic foot infection of his left hallux. He has been considered and recommended for operative debridement with ray amputation of the great toe. Recently he had noninvasive vascular studies which showed noncompressible vessels and there was some concern about his overall wound healing potential.  I reviewed the studies and they show triphasic waveforms throughout both lower extremities. Even the digital waveforms do not show any significant small vessel disease. During his physical examination he has a palpable dorsalis pedis pulse bilaterally and I think he will have more than adequate perfusion to heal a ray amputation.     Past Medical History:     Past Medical History:   Diagnosis Date    Chronic kidney disease     Diabetes mellitus ([facility]     Diabetic neuropathy ([facility]     History of kidney stones     Sleep apnea     uses Bi-Pap nightly        Past Surgical History:     Past Surgical History:   Procedure Laterality Date    APPENDECTOMY      FOOT DEBRIDEMENT Left 01/18/2016    FOOT DEBRIDEMENT Left 2/19/2019    LEFT FOOT   INCISION AND DRAINAGE WITH WOUND VAC APPLICATION performed by Mp Petty DPM at [address] [place] [address] Left 3/12/2019    LEFT FOOT WOUND DEBRIDEMENT WITH Omar Brown performed by Mp Petty DPM at [address] [place] [address] Left 53/04/1407    Application of 44 units of Apligraf biologic graft, left foot    tablet Take 300 mg by mouth daily   Yes Historical Provider, MD   diclofenac (CATAFLAM) 50 MG tablet Take 75 mg by mouth 2 times daily   Yes Historical Provider, MD   zaleplon (SONATA) 10 MG capsule Take 10 mg by mouth nightly   Yes Historical Provider, MD   Handleodan Nelson [address] [facility] by Does not apply route Dispense one temporary handicap placard for six months for ease of transportation 2/23/19   Marco A Garcia DPROSELINE   magnesium (MAGNESIUM-OXIDE) 250 MG TABS tablet Take 250 mg by mouth daily    Historical Provider, MD   Liraglutide (VICTOZA) 18 MG/3ML SOPN SC injection Inject 1.8 mg into the skin daily     Historical Provider, MD        Allergies:       Patient has no known allergies. Social History:     Tobacco:    reports that he has never smoked. He has never used smokeless tobacco.  Alcohol:      reports current alcohol use. Drug Use:  reports no history of drug use.     Family History:     Family History   Problem Relation Age of Onset    Heart Disease Mother     Heart Disease Father        Review of Systems:     Positive and Negative as described in HPI    Constitutional:  negative for  fevers, chills, sweats, fatigue, and weight loss  HEENT:  negative for vision or hearing changes,   Respiratory:  negative for shortness of breath, cough, or congestion  Cardiovascular:  negative for  chest pain, palpitations  Gastrointestinal:  negative for nausea, vomiting, diarrhea, constipation, abdominal pain  Genitourinary:  negative for frequency, dysuria  Integument/Breast:  negative for rash, skin lesions  Musculoskeletal:  negative for muscle aches or joint pain  Neurological:  negative for headaches, dizziness, lightheadedness, numbness, pain and tingling extremities  Behavior/Psych:  negative for depression and anxiety    Code Status:  Full Code    Physical Exam:     Vitals:  /68   Pulse 81   Temp 98.6 °F (37 °C) (Oral)   Resp 17   Ht 6' 4\" (1.93 m)   Wt (!) 360 lb 9.6 oz (163.6 kg)   SpO2 93%   BMI Hospital Problems    Diagnosis Date Noted    Non-healing ulcer of foot, left, with unspecified severity ([facility] [L97.529]     Obstructive sleep apnea [G47.33]     CKD (chronic kidney disease) stage 3, GFR 30-59 ml/min [N18.30]     Class 3 severe obesity due to excess calories with serious comorbidity and body mass index (BMI) of 45.0 to 49.9 in adult ([facility] [E66.01, Z68.42]     Diabetic autonomic neuropathy associated with type 2 diabetes mellitus ([facility] [E11.43]     Type 2 diabetes mellitus with hyperglycemia, with long-term current use of insulin ([facility]) [E11.65, Z79.4]        Plan:     1. Although he has calcific vascular disease in his lower extremity arteries, his PVR waveforms show normal perfusion and he has normal palpable pulses on exam.   2. I have no concerns about his wound healing potential and should have no trouble healing a ray amputation.       Electronically signed by Farhat Levine MD on 10/20/2020 at 5:58 PM     Copy sent to Dr. Bear Johnson MD

## 2020-10-20 NOTE — PROGRESS NOTES
Occupational Therapy   Occupational Therapy Initial Assessment  Date: 10/20/2020   Patient Name: Roger Estrada  MRN: [id number]     : 1960    Date of Service: 10/20/2020    Discharge Recommendations:  Home with assist PRN     RN reports patient is medically stable for therapy treatment this date. Chart reviewed prior to treatment and patient is agreeable for therapy. All lines intact and patient positioned comfortably at end of treatment. All patient needs addressed prior to ending therapy session. Assessment   Performance deficits / Impairments: Decreased functional mobility   Prognosis: Good  Decision Making: Medium Complexity  OT Education: OT Role;Plan of Care;Precautions; Family Education  No Skilled OT: Safe to return home  REQUIRES OT FOLLOW UP: No  Activity Tolerance  Activity Tolerance: Patient Tolerated treatment well  Safety Devices  Safety Devices in place: Yes  Type of devices: Call light within reach; Patient at risk for falls; Left in bed;Nurse notified           Patient Diagnosis(es): The primary encounter diagnosis was Diabetic ulcer of left foot associated with type 2 diabetes mellitus, with other ulcer severity, unspecified part of foot ([address] A diagnosis of Cellulitis of left lower extremity was also pertinent to this visit. has a past medical history of Chronic kidney disease, Diabetes mellitus ([address] Diabetic neuropathy ([address] History of kidney stones, and Sleep apnea. has a past surgical history that includes Lithotripsy (); Appendectomy; Nasal septum surgery; Foot Debridement (Left, 2016); skin split graft (Left, 2016); Foot surgery (Left, 2019); Foot Debridement (Left, 2019); Foot surgery (Left, 2019); Foot surgery (Left, 2019); Foot Debridement (Left, 3/12/2019); other surgical history (Left, 2019); Skin graft (Left, 4/3/2019); Foot Debridement (Left, 2019); Skin graft (Left, 2019); and Skin graft (Left, 2019). Restrictions  Restrictions/Precautions  Restrictions/Precautions: Weight Bearing  Required Braces or Orthoses?: Yes  Lower Extremity Weight Bearing Restrictions  Left Lower Extremity Weight Bearing: Weight Bearing As Tolerated  Required Braces or Orthoses  Right Lower Extremity Brace: (L surgical shoe)  Position Activity Restriction  Other position/activity restrictions: L LE WBAT with surgical shoe on    Subjective   General  Chart Reviewed: Yes  Patient assessed for rehabilitation services?: Yes  Family / Caregiver Present: No    Social/Functional History  Social/Functional History  Lives With: Alone  Type of Home: House  Home Layout: One level  Home Access: Stairs to enter with rails  Entrance Stairs - Number of Steps: 2  Entrance Stairs - Rails: Both  Bathroom Shower/Tub: Tub/Shower unit  Bathroom Toilet: Handicap height  Home Equipment: (knee scooter)  ADL Assistance: Independent  Homemaking Assistance: Independent  Homemaking Responsibilities: Yes  Ambulation Assistance: Independent  Transfer Assistance: Independent  Active : Yes  Mode of Transportation: Car  Occupation: Full time employment  Type of occupation:   Leisure & Hobbies: reading       Objective   Vision: Impaired  Vision Exceptions: Wears glasses for reading  Hearing: Within functional limits    Orientation  Overall Orientation Status: Within Normal Limits  Observation/Palpation  Posture: Fair  Observation: L foot is wrapped  Balance  Sitting Balance: Independent  Standing Balance: Independent  Functional Mobility  Functional - Mobility Device: No device  Activity: To/from bathroom  Assist Level: Independent  Functional Mobility Comments: pt demo'd I with functional mob in room with good safety. Pt does well with no device. Discussed that should wt bearing status change or he have any further surgery, therapy would sign off for now unless that changes.   Toilet Transfers  Equipment Used: Standard toilet  Toilet Transfer: Independent  ADL  Feeding: Independent  Grooming: Independent  UE Bathing: Independent  LE Bathing: Independent  UE Dressing: Independent  LE Dressing: Independent  Toileting: Independent  Tone RUE  RUE Tone: Normotonic  Tone LUE  LUE Tone: Normotonic  Coordination  Movements Are Fluid And Coordinated: Yes     Bed mobility  Supine to Sit: Independent  Sit to Supine: Independent  Transfers  Sit to stand: Independent  Stand to sit:  Independent     Cognition  Overall Cognitive Status: WFL  Perception  Overall Perceptual Status: WFL     Sensation  Overall Sensation Status: Impaired(neuropathy B feet)        LUE AROM (degrees)  LUE AROM : WFL  RUE AROM (degrees)  RUE AROM : WFL  LUE Strength  Gross LUE Strength: WFL  LUE Strength Comment: B UE's 5/5  RUE Strength  Gross RUE Strength: WFL                   Plan   Plan  Times per week: 1 visit with kentrell  Current Treatment Recommendations: Safety Education & Training, Self-Care / ADL, Patient/Caregiver Education & Training            Goals  Short term goals  Time Frame for Short term goals: one visit with kentrell  Short term goal 1: pt will demo and verb good understanding of fall prevention techs, general safety/pacing for mob and ADLs  Patient Goals   Patient goals : to go home       Therapy Time   Individual Concurrent Group Co-treatment   Time In 0835         Time Out 0851         Minutes [address]

## 2020-10-20 NOTE — PROGRESS NOTES
Comprehensive Nutrition Assessment    Type and Reason for Visit:  Initial, Positive Nutrition Screen(Pressure ulcer or non-healing wound)    Nutrition Recommendations/Plan: 1. Suggest continuing Carbohydrate Controlled diet (4 carb choices/meal). 2. Start Freddy 2x/d. Nutrition Assessment:  Patient seen today for non-healing ulcer of left foot with unspecified severity. Pt reports having this wound for years. Awaiting MRI results. Pt states limiting his PO intake due to his high blood sugars of 224mg/dl. Pt still consuming % of all meals. Pt reports having tried Freddy supplement in the past and asked about ordering those again. Suggest continuing with current carbohydrate control diet and writer will order Freddy 2x/d for patient. Malnutrition Assessment:  Malnutrition Status: At risk for malnutrition (Comment)    Context:  Acute Illness     Findings of the 6 clinical characteristics of malnutrition:  Energy Intake:  No significant decrease in energy intake  Weight Loss:  No significant weight loss     Body Fat Loss:  Unable to assess     Muscle Mass Loss:  Unable to assess    Fluid Accumulation:  No significant fluid accumulation Extremities   Strength:  Not Performed    Estimated Daily Nutrient Needs:  Energy (kcal):  6406-9783 (9-11 kcal/kg); Weight Used for Energy Requirements:  Current     Protein (g):  130-150 (0.8-0.9 g/kg); Weight Used for Protein Requirements:  Current        Fluid (ml/day):  1 ml/kcal    Nutrition Related Findings:  Active bowel sounds, non-healing foot ulcer on left foot      Wounds:  Diabetic Ulcer       Current Nutrition Therapies:    DIET CARB CONTROL;   Dietary Nutrition Supplements: Wound Healing Oral Supplement    Anthropometric Measures:  · Height: 6' 4\" (193 cm)  · Current Body Weight: 360 lb 9.6 oz (163.6 kg)   · Admission Body Weight: 375 lb (170.1 kg)    · Ideal Body Weight: 202 lbs; % Ideal Body Weight 178.5 %   · BMI: 43.9  · Adjusted Body Weight:  ; No Adjustment   · BMI Categories: Obese Class 3 (BMI 40.0 or greater)       Nutrition Diagnosis:   · Increased nutrient needs related to acute injury/trauma as evidenced by wounds      Nutrition Interventions:   Food and/or Nutrient Delivery:  Continue Current Diet, Start Oral Nutrition Supplement  Nutrition Education/Counseling:  Education not indicated   Coordination of Nutrition Care:  Continued Inpatient Monitoring    Goals:  PO intake % all meals       Nutrition Monitoring and Evaluation:   Food/Nutrient Intake Outcomes:  Food and Nutrient Intake, Supplement Intake  Physical Signs/Symptoms Outcomes:  Biochemical Data, Skin, Weight     Discharge Planning:     Too soon to determine         Denzel NÚÑEZN, RDN, LDN  Lead Clinical Dietitian  RD Office Phone [phone]

## 2020-10-20 NOTE — PROGRESS NOTES
to his left foot. He was prescribed ciprofloxacin 1 week ago and did not see any improvement. He was seen by Dr. Bonny Rutledge with podiatry who encouraged him to come to the ER for further evaluation. The patient endorses neuropathy to his bilateral feet, he does report some pain to his left foot that he describes as dull, intermittent and moderate in intensity. It is aggravated with movement. No definitive alleviating factors. He denies fever, chills, nausea or vomiting. No additional symptomology. He has past medical history that includes type 2 diabetes, CKD and RAÚL with nightly BiPAP use.     Creatinine 1.51, WBC 11.1, glucose 280, .1, sed rate 86.\"    Review of Systems:     Constitutional:  negative for chills, fevers, sweats  Respiratory:  negative for cough, dyspnea on exertion, shortness of breath, wheezing  Cardiovascular:  negative for chest pain, chest pressure/discomfort, palpitations  Gastrointestinal:  negative for abdominal pain, constipation, diarrhea, nausea, vomiting  Neurological:  negative for dizziness, headache    Medications:      Allergies:  No Known Allergies    Current Meds:   Scheduled Meds:    insulin lispro  0-12 Units Subcutaneous TID WC    insulin lispro  0-6 Units Subcutaneous Nightly    allopurinol  300 mg Oral Daily    gabapentin  300 mg Oral BID    insulin glargine  30 Units Subcutaneous BID    magnesium oxide  400 mg Oral Daily    therapeutic multivitamin-minerals  1 tablet Oral Daily    sodium chloride flush  10 mL Intravenous 2 times per day    cefepime  2 g Intravenous Q12H    linezolid  600 mg Intravenous Q12H    heparin (porcine)  5,000 Units Subcutaneous 3 times per day     Continuous Infusions:    dextrose      sodium chloride 75 mL/hr at 10/20/20 0003     PRN Meds: LORazepam, melatonin, sodium chloride flush, potassium chloride **OR** potassium alternative oral replacement **OR** potassium chloride, magnesium sulfate, acetaminophen **OR** Value Date/Time    SPECIAL NOT REPORTED 10/19/2020 08:08 PM     Lab Results   Component Value Date/Time    CULTURE PENDING 10/19/2020 08:08 PM       Radiology:  Xr Foot Left (min 3 Views)    Result Date: 10/19/2020  Abnormality of the base of the 1st proximal phalanx. This appearance could be caused by chronic infectious process or fracture. MRI may be helpful for better characterization     Mri Foot Left Wo Contrast    Result Date: 10/20/2020  1. Plantar ulceration at the level of the 1st MTP joint with large focal underlying soft tissue abnormality measuring 4.9 x 2.1 x 4.3 cm. Phlegmonous change is felt most likely. Infectious adventitial bursitis is an alternate consideration. 2. Flexor hallucis longus tenosynovitis and partial thickness tearing. The tendon sheath appears to communicate with the plantar soft tissue collection raising concern for infectious tenosynovitis. Additionally there is tenosynovitis of the proximal flexor digitorum longus tendon sheaths which may also be infectious. 3. Joint effusion/synovitis at the 1st MTP joint with associated marrow signal abnormality. Septic arthritis and osteomyelitis should be considered. 4. Marrow signal abnormality of the 1st distal phalanx may reflect early osteomyelitis. Phlegmonous change appears to extend along the plantar aspect of the great toe.        Physical Examination:        General appearance:  alert, cooperative and no distress  Mental Status:  oriented to person, place and time and normal affect  Lungs:  clear to auscultation bilaterally, normal effort  Heart:  regular rate and rhythm, no murmur  Abdomen:  soft, nontender, nondistended, normal bowel sounds, no masses, hepatomegaly, splenomegaly  Extremities:  no edema, redness, tenderness in the calves  Skin:  See pics right foot    Assessment:        Hospital Problems           Last Modified POA    * (Principal) Non-healing ulcer of foot, left, with unspecified severity ([address] 10/19/2020 Yes Type 2 diabetes mellitus with hyperglycemia, with long-term current use of insulin ([facility] [address] 10/19/2020 Yes    Diabetic autonomic neuropathy associated with type 2 diabetes mellitus ([facility] [address] 10/19/2020 Yes    Obstructive sleep apnea 10/19/2020 Yes    Class 3 severe obesity due to excess calories with serious comorbidity and body mass index (BMI) of 45.0 to 49.9 in adult [facility]) 10/19/2020 Yes    CKD (chronic kidney disease) stage 3, GFR 30-59 ml/min 10/20/2020 Yes          Plan:        1. Await podiatry input on mri and if any surgical procedure needed or not  2. Cont iv antibiotics for now; not sure he needs to remain on zyvox, started by ER  3. ID consult  4.  Dc ivf    Ellis Dai,   10/20/2020  12:53 PM

## 2020-10-20 NOTE — FLOWSHEET NOTE
Patient states well. Patient declines visit at this time. Follow up as needed.      10/20/20 1352   Encounter Summary   Services provided to: Patient   Referral/Consult From: Rounding   Continue Visiting   (10-20-20)   Complexity of Encounter Low   Length of Encounter 15 minutes   Spiritual Assessment Completed Yes   Routine   Type Initial   Assessment Passive   Intervention Explored feelings, thoughts, concerns   Outcome Refused/declined

## 2020-10-20 NOTE — CONSULTS
Consultation Note  Podiatric Medicine and Surgery     Subjective     Chief Complaint: Left foot wound    HPI:  Bethany Coronado is a 63 y.o. male seen at [facility] ED for left foot wound. Patient notes that he has had a left foot wound for the last year that has been treated by podiatrist Dr. Jessica Vazquez. Has a surgical shoe for the left foot and has been performing daily dressing changes and has been soaking his foot in saline. Denies any new falls or trauma. Relates that he has had a history of a fracture in his left great toe for quite some time and that is not new. Notes that he has very little sensation to feet however his great toe has a constant sharp localized pain to his great toe joint. Has been on 1 week course of Cipro and notes that since Monday left foot has become more red and swollen and painful with increasing drainage. Patient was seen by Dr. David Fang today who recommended reporting to ED for further evaluation. PCP is David Schneider MD    ROS:   Review of Systems   Constitutional: Negative for chills and fever. Respiratory: Negative for shortness of breath. Cardiovascular: Negative for chest pain and leg swelling. Gastrointestinal: Negative for nausea and vomiting. Skin: Positive for color change and wound. Neurological: Positive for numbness. Negative for weakness. Past Medical History   has a past medical history of Chronic kidney disease, Diabetes mellitus ([address] Diabetic neuropathy ([address] History of kidney stones, and Sleep apnea. Past Surgical History   has a past surgical history that includes Lithotripsy (2013); Appendectomy; Nasal septum surgery; Foot Debridement (Left, 01/18/2016); skin split graft (Left, 1/2016); Foot surgery (Left, 02/19/2019); Foot Debridement (Left, 2/19/2019); Foot surgery (Left, 2/22/2019); Foot surgery (Left, 03/12/2019); Foot Debridement (Left, 3/12/2019); other surgical history (Left, 04/03/2019);  Skin graft (Left, 4/3/2019); Known Allergies. Family History  family history includes Heart Disease in his father and mother. Social History   reports that he has never smoked. He has never used smokeless tobacco.   reports current alcohol use. reports no history of drug use. Objective     Vitals:  Patient Vitals for the past 8 hrs:   BP Temp Temp src Pulse Resp SpO2 Height Weight   10/19/20 1619 106/68 99.3 °F (37.4 °C) Oral 91 16 98 % -- --   10/19/20 1616 -- -- Oral -- -- -- 6' 4\" (1.93 m) (!) 375 lb (170.1 kg)     Average, Min, and Max for last 24 hours Vitals:  TEMPERATURE:  Temp  Av.3 °F (37.4 °C)  Min: 99.3 °F (37.4 °C)  Max: 99.3 °F (37.4 °C)    RESPIRATIONS RANGE: Resp  Av  Min: 16  Max: 16    PULSE RANGE: Pulse  Av  Min: 91  Max: 91    BLOOD PRESSURE RANGE:  Systolic (80ARG), EOZ:784 , Min:106 , QSB:740   ; Diastolic (07VCQ), EPK:08, Min:68, Max:68      PULSE OXIMETRY RANGE: SpO2  Av %  Min: 98 %  Max: 98 %  I&O:  No intake/output data recorded. CBC:  Recent Labs     10/19/20  1930   WBC 11.1   HGB 13.0   HCT 40.3*           BMP:  Recent Labs     10/19/20  1930   *   K 4.4   CL 98   CO2 23   BUN 30*   CREATININE 1.51*   GLUCOSE 280*   CALCIUM 9.4        Coags:  No results for input(s): APTT, PROT, INR in the last 72 hours. Lab Results   Component Value Date    LABA1C 8.8 (H) 10/26/2015     Lab Results   Component Value Date    SEDRATE 86 (H) 10/19/2020     Lab Results   Component Value Date    CRP 97.3 (H) 2019         Lower Extremity Physical Exam:  Vascular: DP and PT pulses are palpable. CFT <3 seconds to all digits. Hair growth is absent to the level of the digits. Non pitting edema left foot. Neuro: Saph/sural/SP/DP/plantar sensation absent to light touch. Musculoskeletal: Muscle strength is 5/5 to all lower extremity muscle groups. Gross deformity is absent. POP noted to first MPJ, subfirst met head left foot. Negative pain on calf squeeze bilaterally.   Able to dorsiflex and plantarflex hallux to full active range of motion left foot. All muscle compartments soft compressible. Dermatologic: ulcer #1 located subfirst metatarsal head left foot and measures approximately 0.3 cm x 0.3 cm x 2.5 cm. Base is fibrotic. Periwound skin is macerated. Seropurulent drainage noted with associated mal odor. Erythema present surrounding plantar first metatarsal head traveling dorsally to level of first MPJ with  associated increase in warmth. Does probe to bone, no sinus track, or undermine. induration noted without fluctuance or crepitus. ulcer #2 located sub first metatarsal head left foot distal to ulcer #1 and measures approximately 0.3 cm x 0.3 cm x 2.0 cm. Base is fibrogranular. Periwound skin is macerated. Seropurulent drainage noted with associated mal odor. Erythema present surrounding plantar first metatarsal head traveling dorsally to level of first MPJ with  associated increase in warmth. Does not probe to bone, sinus track, or undermine. No fluctuance, crepitus, or induration. ulcer #2 located sub medial proximal phalanx left foot to ulcer #1 and measures approximately 0.3 cm x 0.3 cm x 1.5 cm. Base is fibrotic. Periwound skin is macerated. Seropurulent drainage noted with associated mal odor. Erythema present surrounding plantar first metatarsal head traveling dorsally to level of first MPJ with  associated increase in warmth. Does not probe to bone, sinus track, or undermine. induration noted without fluctuance or crepitus. Clinical Images:                      Imaging:   XR FOOT LEFT (MIN 3 VIEWS)   Final Result   Abnormality of the base of the 1st proximal phalanx. This appearance could   be caused by chronic infectious process or fracture. MRI may be helpful for   better characterization             Cultures: Obtained left foot    Assessment     Sherri Del Rio is a 63 y.o. male with   1. Abscess left foot  2. TARA  3.  Cellulitis left foot  4. Diabetic foot ulcer down to bone left foot  5. DM2 with peripheral neuropathy      Active Problems:    Non-healing ulcer of foot, left, with unspecified severity ([address]  Resolved Problems:    * No resolved hospital problems. *        Plan     · Patient examined and evaluated at bedside   · Treatment options discussed in detail with the patient  · Expressed approximately 2 cc of purulence from left foot wound bedside in ED. Irrigated with copious amounts of normal sterile saline. · Reviewed-possible pathological fracture proximal phalanx of left hallux, no gas  · PVRs ordered  · High suspicion for abscess and osteomyelitis first metatarsal head and proximal phalanx left foot. Recommend admission under medicine service for management of TARA and hyperglycemia.   Will order MRI to assess for extent of OM/abscess  · Recommend ID consult-given cefepime/Zyvox in ED  · Cx obtained left foot  · Dressing applied to Left foot: 1/4in iodoform, DSD, ABD, Kerlix, Ace  · WBAT in surgical shoe to Left lower extremity  · Discussed with Dr. Betty Pearson, DPM   Podiatric Medicine & Surgery   10/19/2020 at 9:11 PM

## 2020-10-20 NOTE — H&P
open area to his left foot. He was prescribed ciprofloxacin 1 week ago and did not see any improvement. He was seen by Dr. Giulia Galvan with podiatry who encouraged him to come to the ER for further evaluation. The patient endorses neuropathy to his bilateral feet, he does report some pain to his left foot that he describes as dull, intermittent and moderate in intensity. It is aggravated with movement. No definitive alleviating factors. He denies fever, chills, nausea or vomiting. No additional symptomology. He has past medical history that includes type 2 diabetes, CKD and RAÚL with nightly BiPAP use. Creatinine 1.51, WBC 11.1, glucose 280, .1, sed rate 86. Rapid SARS-CoV-2 negative. X-ray left foot shows abnormality of the base of the first proximal phalanx. This appearance could be caused by chronic infectious process or fracture. MRI may be helpful for better characterization.     Past Medical History:     Past Medical History:   Diagnosis Date    Chronic kidney disease     [facility] mellitus ([facility]     Diabetic neuropathy ([facility]     History of kidney stones     Sleep apnea     uses Bi-Pap nightly        Past Surgical History:     Past Surgical History:   Procedure Laterality Date    APPENDECTOMY      FOOT DEBRIDEMENT Left 01/18/2016    FOOT DEBRIDEMENT Left 2/19/2019    LEFT FOOT   INCISION AND DRAINAGE WITH WOUND VAC APPLICATION performed by Alton Gonzalez DPM at [facility] Left 3/12/2019    LEFT FOOT WOUND DEBRIDEMENT WITH Kalyn Tran performed by Alton Gonzalez DPM at [facility] 82/21/8990    Application of 44 units of Apligraf biologic graft, left foot    FOOT SURGERY Left 02/19/2019    LEFT FOOT   INCISION AND DRAINAGE WITH WOUND VAC APPLICATION (Left )    FOOT SURGERY Left 2/22/2019    DELAYED PRIMARY CLOSURE LEFT FOOT      PULSEVAC performed by Alton Gonzalez DPM at [address] [place] [address] Left 03/12/2019    wound debridement    LITHOTRIPSY 2013    NASAL SEPTUM SURGERY      OTHER SURGICAL HISTORY Left 04/03/2019    foot debridement and eppifix application    SKIN GRAFT Left 4/3/2019    THERASKIN AND EPIFIX APPLICATION  AND  LEFT FOOT WOUND DEBRIDEMENT AND WOUND PREP performed by Farooq Varner DPM at [address] [place] [address] Left 4/18/2019    LEFT FOOT APPLICATION APPLIGRAFT performed by Farooq Varner DPM at [address] [place] [address] Left 7/4/6211    GRAFT APPLICATION LEFT FOOT - WCB performed by Farooq Varner DPM at [facility] Left 1/2016    foot        Medications Prior to Admission:     Prior to Admission medications    Medication Sig Start Date End Date Taking? Authorizing Provider   insulin glargine (LANTUS) 100 UNIT/ML injection vial Inject 60 Units into the skin 2 times daily Patient takes 30 units in the AM and 30 units in the PM   Yes Historical Provider, MD   melatonin 5 MG TABS tablet Take 5-10 mg by mouth daily as needed (sleep)   Yes Historical Provider, MD   fluticasone (FLONASE) 50 MCG/ACT nasal spray 1-2 sprays by Each Nare route daily 2 sprays in right nostril and 1 spray in the left nostril   Yes Historical Provider, MD   Multiple Vitamins-Minerals (THERAPEUTIC MULTIVITAMIN-MINERALS) tablet Take 1 tablet by mouth daily   Yes Historical Provider, MD   Lidocaine 4 % GEL Apply topically 2 times daily as needed (hand pain) Uses before and after work   Yes Historical Provider, MD   gabapentin (NEURONTIN) 300 MG capsule Take 300 mg by mouth 2 times daily. .   Yes Historical Provider, MD   glipiZIDE-metformin (METAGLIP) 5-500 MG per tablet Take 2 tablets by mouth 2 times daily    Yes Historical Provider, MD   allopurinol (ZYLOPRIM) 300 MG tablet Take 300 mg by mouth daily   Yes Historical Provider, MD   diclofenac (CATAFLAM) 50 MG tablet Take 75 mg by mouth 2 times daily   Yes Historical Provider, MD   zaleplon (SONATA) 10 MG capsule Take 10 mg by mouth nightly   Yes Historical Provider, MD   Handicap Placard [facility] by Does not apply route Dispense one temporary handicap placard for six months for ease of transportation 2/23/19   Kaylie Mike DPM   magnesium (MAGNESIUM-OXIDE) 250 MG TABS tablet Take 250 mg by mouth daily    Historical Provider, MD   Liraglutide (VICTOZA) 18 MG/3ML SOPN SC injection Inject 1.8 mg into the skin daily     Historical Provider, MD        Allergies:     Patient has no known allergies. Social History:     Tobacco:    reports that he has never smoked. He has never used smokeless tobacco.  Alcohol:      reports current alcohol use. Drug Use:  reports no history of drug use. Family History:     Family History   Problem Relation Age of Onset    Heart Disease Mother     Heart Disease Father        Review of Systems:     Positive and Negative as described in HPI. Review of Systems   Constitutional: Negative for chills, diaphoresis and fever. HENT: Negative for congestion. Eyes: Negative for visual disturbance. Respiratory: Negative for cough, chest tightness, shortness of breath and wheezing. Cardiovascular: Negative for chest pain, palpitations and leg swelling. Gastrointestinal: Negative for abdominal pain, blood in stool, constipation, diarrhea, nausea and vomiting. Endocrine: Negative for cold intolerance and heat intolerance. Genitourinary: Negative for difficulty urinating, dysuria, frequency and urgency. Musculoskeletal: Positive for arthralgias and myalgias. Skin: Positive for color change and wound. Neurological: Positive for numbness. Negative for dizziness, weakness, light-headedness and headaches. Chronic neuropathy to bilateral feet. Hematological: Does not bruise/bleed easily. Psychiatric/Behavioral: The patient is not nervous/anxious. All other systems reviewed and are negative.     Physical Exam:   BP (!) 144/68   Pulse 95   Temp 99.5 °F (37.5 °C) (Oral)   Resp 18   Ht 6' 4\" (1.93 m)   Wt (!) 375 lb (170.1 kg)   SpO2 96%   BMI 45.65 kg/m²   Temp (24hrs), Av °F (37.8 °C), Min:99.3 °F (37.4 °C), Max:101.2 °F (38.4 °C)    Recent Labs     10/19/20  2136   POCGLU 224*     No intake or output data in the 24 hours ending 10/19/20 2230    Physical Exam  Vitals signs and nursing note reviewed. Constitutional:       General: He is not in acute distress. Appearance: He is obese. He is not diaphoretic. HENT:      Head: Normocephalic and atraumatic. Right Ear: Hearing normal.      Left Ear: Hearing normal.      Nose: Nose normal. No rhinorrhea. Eyes:      General: Lids are normal.      Extraocular Movements:      Right eye: Normal extraocular motion. Left eye: Normal extraocular motion. Conjunctiva/sclera: Conjunctivae normal.      Right eye: Right conjunctiva is not injected. Left eye: Left conjunctiva is not injected. Pupils: Pupils are equal, round, and reactive to light. Pupils are equal.      Right eye: Pupil is reactive. Left eye: Pupil is reactive. Neck:      Musculoskeletal: Neck supple. Thyroid: No thyromegaly. Vascular: No carotid bruit. Trachea: Trachea and phonation normal. No tracheal deviation. Cardiovascular:      Rate and Rhythm: Normal rate and regular rhythm. Pulses: Normal pulses. Heart sounds: Normal heart sounds. No murmur. Pulmonary:      Effort: Pulmonary effort is normal. No respiratory distress. Breath sounds: No stridor. Examination of the right-lower field reveals decreased breath sounds. Examination of the left-lower field reveals decreased breath sounds. Decreased breath sounds present. Abdominal:      General: Bowel sounds are normal. There is no distension. Palpations: Abdomen is soft. There is no mass. Tenderness: There is no abdominal tenderness. There is no guarding. Musculoskeletal:         General: No tenderness. Skin:     General: Skin is warm and dry.    Neurological:      Mental Status: He is alert and oriented to person, place, and C-Reactive Protein    Collection Time: 10/19/20  7:30 PM   Result Value Ref Range    .1 (H) 0.0 - 5.0 mg/L   COVID-19    Collection Time: 10/19/20  8:30 PM    Specimen: Other   Result Value Ref Range    SARS-CoV-2          SARS-CoV-2, Rapid Not Detected Not Detected    Source . NASOPHARYNGEAL SWAB     SARS-CoV-2         POC Glucose Fingerstick    Collection Time: 10/19/20  9:36 PM   Result Value Ref Range    POC Glucose 224 (H) 75 - 110 mg/dL       Imaging/Diagnostics:  Xr Foot Left (min 3 Views)    Result Date: 10/19/2020  Abnormality of the base of the 1st proximal phalanx. This appearance could be caused by chronic infectious process or fracture. MRI may be helpful for better characterization     Assessment :      Hospital Problems           Last Modified POA    * (Principal) Non-healing ulcer of foot, left, with unspecified severity ([facility] 10/19/2020 Yes    Type 2 diabetes mellitus with hyperglycemia, with long-term current use of insulin ([facility] 10/19/2020 Yes    Diabetic autonomic neuropathy associated with type 2 diabetes mellitus ([facility] 10/19/2020 Yes    Obstructive sleep apnea 10/19/2020 Yes    Class 3 severe obesity due to excess calories with serious comorbidity and body mass index (BMI) of 45.0 to 49.9 in adult [facility]) 10/19/2020 Yes    CKD (chronic kidney disease) stage 3, GFR 30-59 ml/min 10/20/2020 Yes          Plan:     Patient status inpatient in the  Med/Surge unit. 1. Consult podiatry- left foot wound. 2. IV vancomycin and zyvox and cefepime. 3. MRI left foot in AM.   4. DM- monitor and control blood glucose, continue lantus, add medium dose insulin sliding scale. 5. RAÚL- utilize home bipap. 6. Obesity- lifestyle modifications. 7. CKD- avoid nephrotoxic agents. 8. Neuropathy- continue gabapentin. 9. IV hydration. 10. Monitor vital signs. 11. Follow chemistries. 12. DVT prophylaxis with subq heparin. 13. Supplemental oxygen as needed. 14. Telemetry.   15. Diabetic diet.  16. Activity as tolerated with assist, NWB left foot. 17. PT/OT. Plan of care discussed with patient, Morena Pinon 2891. Consultations:   IP CONSULT TO INTERNAL MEDICINE  IP CONSULT TO PODIATRY    Patient is admitted as inpatient status because of co-morbidities listed above, severity of signs and symptoms as outlined, requirement for current medical therapies and most importantly because of direct risk to patient if care not provided in a hospital setting. Expected length of stay > 48 hours.     LIBBY Del Cid CNP  10/19/2020  10:30 PM    Copy sent to Dr. My Finley MD     (Please note that portions of this note were completed with a voice recognition program. Efforts were made to edit the dictations but occasionally words are mis-transcribed.)

## 2020-10-20 NOTE — DISCHARGE INSTR - COC
Continuity of Care Form    Patient Name: Jovani Abad   :  1960  MRN:  [id number]    [facility] date:  10/19/2020  Discharge date:  10/29/20    Code Status Order: Full Code   Advance Directives:   [facility] Documentation     Date/Time Healthcare Directive Type of Healthcare Directive Copy in [address] Agent's Name Healthcare Agent's Phone Number    10/21/20 [phone]  No, patient does not have an advance directive for healthcare treatment -- -- -- -- --    10/19/20 2129  No, patient does not have an advance directive for healthcare treatment -- -- -- -- --          Admitting Physician:  Bee Dai DO  PCP: Shira Warner MD    Discharging Nurse:  [facility] Unit/Room#:   Discharging Unit Phone Number: [phone]     Emergency Contact:   Extended Emergency Contact Information  Primary Emergency Contact: [address] Phone: [phone]  Work Phone: [phone]  Mobile Phone: [phone]  Relation: Brother/Sister    Past Surgical History:  Past Surgical History:   Procedure Laterality Date    APPENDECTOMY      FOOT DEBRIDEMENT Left 2016    FOOT DEBRIDEMENT Left 2019    LEFT FOOT   INCISION AND DRAINAGE WITH WOUND VAC APPLICATION performed by Noah Gutierrez DPM at [address] [place] [address],[facility] Left 3/12/2019    LEFT FOOT WOUND [facility] performed by Noah Gutierrez DPM at [address] [place] [address],[facility] Left     Application of 44 units of Apligraf biologic graft, left foot    FOOT SURGERY Left 2019    LEFT FOOT   INCISION AND DRAINAGE WITH WOUND VAC APPLICATION (Left )    FOOT SURGERY Left 2019    DELAYED PRIMARY CLOSURE LEFT FOOT      PULSEVAC performed by Noah Gutierrez DPM at [address] [place] [address] Rd Left 2019    wound debridement    IR INS PICC VAD W SQ PORT GREATER THAN 5  10/26/2020    IR INS PICC VAD W SQ PORT GREATER THAN 5 10/26/2020 Angie Diggs MD [facility]    LITHOTRIPSY  2013    NASAL SEPTUM SURGERY      OTHER SURGICAL HISTORY Left 04/03/2019    foot debridement and eppifix application    SKIN GRAFT Left 4/3/2019    THERASKIN AND EPIFIX APPLICATION  AND  LEFT FOOT WOUND DEBRIDEMENT AND WOUND PREP performed by Rj Ham DPM at [address] [place] [address] Left 4/18/2019    LEFT FOOT APPLICATION APPLIGRAFT performed by Rj Ham DPM at [address] [place] [address] Left 8/8/6931    GRAFT APPLICATION LEFT FOOT - WCB performed by Rj Ham DPM at [facility] Left 1/2016    foot    TOE AMPUTATION Left 10/21/2020    LEFT PARTIAL FIRST RAY  AMPUTATION performed by Edmar Pollard DPM at [address] [place] [address]       Immunization History:   Immunization History   Administered Date(s) Administered    Influenza Vaccine, unspecified formulation 10/01/2011, 11/01/2012, 11/01/2013    Influenza Virus Vaccine 10/01/2011, 11/01/2012, 11/01/2013, 11/03/2014, 10/30/2015    Influenza, High Dose (Fluzone 65 yrs and older) 11/03/2017    Influenza, MDCK Julianna Areliser, with preserv IM (Flucelvax 4 yrs and older) 10/16/2018, 11/21/2019    Influenza, Julianna Hamburger, IM, (6 mo and older Fluzone, Flulaval, Fluarix and 3 yrs and older Afluria) 11/03/2014, 10/30/2015, 10/16/2018    Influenza, Julianna Hamburger, IM, PF (6 mo and older Fluzone, Flulaval, Fluarix, and 3 yrs and older Afluria) 11/03/2014, 10/30/2015, 09/17/2020    Pneumococcal Conjugate 13-valent ([id number]) 11/03/2017    Pneumococcal Polysaccharide ([id number]) 11/01/2011       Active Problems:  Patient Active Problem List   Diagnosis Code    Diabetes mellitus type 2 in obese ([facility]) E11.69, E66.9    Chronic ulcer of left foot with fat layer exposed ([facility] L97.522    Type 2 diabetes mellitus with hyperglycemia, with long-term current use of insulin ([facility]) E11.65, Z79.4    Ulcer of toe of left foot ([facility] L97.529    Diabetic autonomic neuropathy associated with type 2 diabetes mellitus ([facility]) E11.43  Type 2 diabetes mellitus with diabetic peripheral angiopathy without gangrene ([facility]) E11.51    Diabetic foot infection ([facility] E11.628, L08.9    Obstructive sleep apnea G47.33    Class 3 severe obesity due to excess calories with serious comorbidity and body mass index (BMI) of 45.0 to 49.9 in adult ([facility]) E66.01, Z68.42    High anion gap metabolic acidosis G82.6    CKD (chronic kidney disease) stage 3, GFR 30-59 ml/min N18.30    Diabetes mellitus due to underlying condition with diabetic neuropathy, without long-term current use of insulin ([facility]) E08.40    Abscess of foot L02.619    Bacterial infection due to Morganella morganii A49.8    Non-healing ulcer of foot, left, with unspecified severity ([facility] L97.529       Isolation/Infection:   Isolation          No Isolation        Patient Infection Status     None to display          Nurse Assessment:  Last Vital Signs: BP (!) 101/48   Pulse 63   Temp 97.7 °F (36.5 °C) (Oral)   Resp 18   Ht 6' 4\" (1.93 m)   Wt (!) 375 lb (170.1 kg)   SpO2 94%   BMI 45.65 kg/m²     Last documented pain score (0-10 scale): Pain Level: 5  Last Weight:   Wt Readings from Last 1 Encounters:   10/28/20 (!) 375 lb (170.1 kg)     Mental Status:  oriented and alert    IV Access:  - PICC - site  R Basilic, insertion date: 10/26/20    Nursing Mobility/ADLs:  Walking   Assisted  Transfer  Assisted  Bathing  Assisted  Dressing  Assisted  Toileting  Assisted  Feeding  Assisted  Med Admin  Assisted  Med Delivery   whole    Wound Care Documentation and Therapy:        Elimination:  Continence:   · Bowel: Yes  · Bladder: Yes  Urinary Catheter: None   Colostomy/Ileostomy/Ileal Conduit: No       Date of Last BM: 10/29/20    Intake/Output Summary (Last 24 hours) at 10/28/2020 1719  Last data filed at 10/28/2020 1401  Gross per 24 hour   Intake --   Output 1550 ml   Net -1550 ml     I/O last 3 completed shifts: In: 200 [IV Piggyback:200]  Out: 2050 [Urine:2050]    Safety Concerns:      At Risk for Falls    Impairments/Disabilities:      None    Nutrition Therapy:  Current Nutrition Therapy:   - Oral Diet:  Carb Control 4 carbs/meal (1800kcals/day)    Routes of Feeding: Oral  Liquids: No Restrictions  Daily Fluid Restriction: no  Last Modified Barium Swallow with Video (Video Swallowing Test): not done    Treatments at the Time of Hospital Discharge:   Respiratory Treatments: na  Oxygen Therapy:  is not on home oxygen therapy. Ventilator:    - No ventilator support    Rehab Therapies: Physical Therapy and Occupational Therapy  Weight Bearing Status/Restrictions: Non-weight bearing on left leg  Other Medical Equipment (for information only, NOT a DME order):  Knee scooter  Other Treatments:   Skilled nurse assessment  Medication teaching and compliance  PICC line care per agency protocol. Teach patient to administer IV antibiotics  Daily dressing changes; teach pt to do dressing changes. Please change patient's dressing daily to the left foot. Dressing to include: 1/4 inch iodoform, adaptic, 4x4 fluffs, ABD, kerlix, and Ace.    Diabetic teaching, blood sugar monitoring   IV Unasyn 3000mg every 6 hours      Patient's personal belongings (please select all that are sent with patient):  Glasses, clothing, wheelchair, Ipad    RN SIGNATURE:  Electronically signed by Lesley Pena RN on 10/29/20 at 3:59 PM EDT    CASE MANAGEMENT/SOCIAL WORK SECTION    Inpatient Status Date: ***    Readmission Risk Assessment Score:  Readmission Risk              Risk of Unplanned Readmission:        15           Discharging to Facility/ Agency    Name: Name: Junaid Leger Address:    Phone:  [phone]  · Fax: [phone]    [place]  Phone: [phone]  Fax: [phone]  ·       / signature: Electronically signed by ASIYA Jauregui on 10/26/20 at 1:52 PM EDT    PHYSICIAN SECTION    Prognosis: Good    Condition at Discharge: Stable    Rehab Potential (if transferring to Rehab): Good    Recommended Labs or Other Treatments After Discharge:   Podiatry- Please have home health care change dressing to left foot daily: 1/4 inch iodoform, adaptic, 4x4 fluffs, kerlix, Ace. Strict nonweight bearing to the left lower extremity. Check CBC, BMP, CRP on Mondays  Schedule for follow-up visit in 4 weeks  Fax results to : [phone]      Physician Certification: I certify the above information and transfer of Simran Garcia  is necessary for the continuing treatment of the diagnosis listed and that he requires [address] for less 30 days.      Update Admission H&P: No change in H&P    PHYSICIAN SIGNATURE:  Electronically signed by Cody Olmstead MD on 10/27/20 at 8:53 PM EDT

## 2020-10-20 NOTE — PROGRESS NOTES
Physical Therapy    Facility/Department: [facility]  Initial Assessment    NAME: Kingston Ventura  : 1960  MRN: [id number]    Date of Service: 10/20/2020    Discharge Recommendations:  Home independently        Assessment   Assessment: Patient independent with functional mobility and currently has no acute therapy needs, discussed with RN and patient if weightbearing status would change to reorder therapy and a reassessment can be done. Recommend D/C home at this time. Prognosis: Good  Decision Making: Low Complexity  PT Education: Goals;PT Role;Plan of Care  No Skilled PT: Independent with functional mobility   REQUIRES PT FOLLOW UP: No  Activity Tolerance  Activity Tolerance: Patient Tolerated treatment well       Patient Diagnosis(es): The primary encounter diagnosis was Diabetic ulcer of left foot associated with type 2 diabetes mellitus, with other ulcer severity, unspecified part of foot ([address] A diagnosis of Cellulitis of left lower extremity was also pertinent to this visit. has a past medical history of Chronic kidney disease, Diabetes mellitus ([address] Diabetic neuropathy ([address] History of kidney stones, and Sleep apnea. has a past surgical history that includes Lithotripsy (); Appendectomy; Nasal septum surgery; Foot Debridement (Left, 2016); skin split graft (Left, 2016); Foot surgery (Left, 2019); Foot Debridement (Left, 2019); Foot surgery (Left, 2019); Foot surgery (Left, 2019); Foot Debridement (Left, 3/12/2019); other surgical history (Left, 2019); Skin graft (Left, 4/3/2019); Foot Debridement (Left, 2019); Skin graft (Left, 2019); and Skin graft (Left, 2019).     Restrictions  Restrictions/Precautions  Restrictions/Precautions: Weight Bearing  Required Braces or Orthoses?: Yes  Lower Extremity Weight Bearing Restrictions  Left Lower Extremity Weight Bearing: Weight Bearing As Tolerated  Required Braces or Orthoses  Right Lower Extremity Brace: (L surgical shoe)  Position Activity Restriction  Other position/activity restrictions: L LE WBAT with surgical shoe on  Vision/Hearing  Vision: Impaired  Vision Exceptions: Wears glasses for reading  Hearing: Within functional limits     Subjective  General  Chart Reviewed: Yes  Patient assessed for rehabilitation services?: Yes  Family / Caregiver Present: No  Follows Commands: Within Functional Limits  General Comment  Comments: Dany SANFORD states patient appropriate for PT  Subjective  Subjective: patient agreeable to work with therapy          Orientation  Orientation  Overall Orientation Status: Within Functional Limits  Social/Functional History  Social/Functional History  Lives With: Alone  Type of Home: House  Home Layout: One level  Home Access: Stairs to enter with rails  Entrance Stairs - Number of Steps: 2  Entrance Stairs - Rails: Both  Bathroom Shower/Tub: Tub/Shower unit  Bathroom Toilet: Handicap height  Home Equipment: (knee scooter)  ADL Assistance: Independent  Homemaking Assistance: Independent  Homemaking Responsibilities: Yes  Ambulation Assistance: Independent  Transfer Assistance: Independent  Active : Yes  Mode of Transportation: Car  Occupation: Full time employment  Type of occupation:   Leisure & Hobbies: reading  Cognition   Cognition  Overall Cognitive Status: WFL    Objective     Observation/Palpation  Posture: Fair  Observation: L foot is wrapped    AROM RLE (degrees)  RLE AROM: WFL  AROM LLE (degrees)  LLE AROM : WFL  Strength RLE  Strength RLE: WFL  Strength LLE  Strength LLE: Exception  Comment: ankle not tested due to wound, able to move against gravity and with ambulation grossly 4/5  L Ankle Dorsiflexion: 4/5  L Ankle Plantar Flexion: 4/5  Motor Control  Gross Motor?: WFL  Sensation  Overall Sensation Status: Impaired(neuropathy B feet)  Bed mobility  Supine to Sit: Independent  Sit to Supine: Independent  Comment: patient sitting EOB when therapist entered room, patient layed down when talking to therapist and sat back up for remainder of eval  Transfers  Sit to Stand: Independent  Stand to sit:  Independent  Ambulation  Ambulation?: Yes  WB Status: L WBAT with surgical shoe  Ambulation 1  Surface: level tile  Device: No Device  Assistance: Independent  Gait Deviations: None  Distance: 50 feet  Comments: manages well with surgical shoe on L foot, no loss of balance, steady     Balance  Sitting - Static: Good  Sitting - Dynamic: Good  Standing - Static: Good  Standing - Dynamic: Fair;+        Plan   Safety Devices  Type of devices: Left in bed, Nurse notified, Call light within reach    G-Code       OutComes Score                                                  AM-PAC Score  AM-PAC Inpatient Mobility Raw Score : 24 (10/20/20 0937)  AM-PAC Inpatient T-Scale Score : 61.14 (10/20/20 0937)  Mobility Inpatient CMS 0-100% Score: 0 (10/20/20 0937)  Mobility Inpatient CMS G-Code Modifier : [address] (10/20/20 8244)          Goals          Therapy Time   Individual Concurrent Group Co-treatment   Time In 0835         Time Out 0850(additional 10 minutes for chart review)         Minutes 15                Nicole Post, PT

## 2020-10-20 NOTE — PROGRESS NOTES
Progress Note  Podiatric Medicine and Surgery     Subjective     CC: Left foot wound    Patient seen and examined at bedside. No acute events overnight. Febrile in the ED. T-max- 101.2. Hypotensive. Low grade fever overnight   Admits to some pain in the medial left ankle    HPI :  Karli Curran is a 63 y.o. male seen at [facility] ED for left foot wound. Patient notes that he has had a left foot wound for the last year that has been treated by podiatrist Dr. Nikia Velázquez. Has a surgical shoe for the left foot and has been performing daily dressing changes and has been soaking his foot in saline. Denies any new falls or trauma. Relates that he has had a history of a fracture in his left great toe for quite some time and that is not new. Notes that he has very little sensation to feet however his great toe has a constant sharp localized pain to his great toe joint. Has been on 1 week course of Cipro and notes that since Monday left foot has become more red and swollen and painful with increasing drainage. Patient was seen by Dr. Vicki Bingham today who recommended reporting to ED for further evaluation. ROS: Denies N/V/F/C/SOB/CP. Otherwise negative except at stated in the HPI.      Medications:  Scheduled Meds:   insulin lispro  0-12 Units Subcutaneous TID WC    insulin lispro  0-6 Units Subcutaneous Nightly    allopurinol  300 mg Oral Daily    gabapentin  300 mg Oral BID    insulin glargine  30 Units Subcutaneous BID    magnesium oxide  400 mg Oral Daily    therapeutic multivitamin-minerals  1 tablet Oral Daily    sodium chloride flush  10 mL Intravenous 2 times per day    cefepime  2 g Intravenous Q12H    linezolid  600 mg Intravenous Q12H    heparin (porcine)  5,000 Units Subcutaneous 3 times per day       Continuous Infusions:   dextrose      sodium chloride 75 mL/hr at 10/20/20 0003       PRN Meds:LORazepam, melatonin, sodium chloride flush, potassium chloride **OR** potassium alternative oral replacement **OR** potassium chloride, magnesium sulfate, acetaminophen **OR** acetaminophen, polyethylene glycol, promethazine **OR** ondansetron, nicotine, glucose, dextrose, glucagon (rDNA), dextrose, morphine    Objective     Vitals:  Patient Vitals for the past 8 hrs:   BP Temp Temp src Pulse Resp SpO2 Weight   10/20/20 0333 (!) 108/46 100.2 °F (37.9 °C) Oral 88 20 96 % (!) 360 lb 9.6 oz (163.6 kg)   10/20/20 0004 130/69 99.9 °F (37.7 °C) Oral 84 18 97 % --     Average, Min, and Max for last 24 hours Vitals:  TEMPERATURE:  Temp  Av °F (37.8 °C)  Min: 99.3 °F (37.4 °C)  Max: 101.2 °F (38.4 °C)    RESPIRATIONS RANGE: Resp  Av  Min: 16  Max: 20    PULSE RANGE: Pulse  Av.6  Min: 84  Max: 95    BLOOD PRESSURE RANGE:  Systolic (71GCD), JZL:535 , Min:106 , JBH:066   ; Diastolic (81UCV), FCE:35, Min:46, Max:69      PULSE OXIMETRY RANGE: SpO2  Av.8 %  Min: 96 %  Max: 98 %    I/O last 3 completed shifts: In: 694 [I.V.:694]  Out: 450 [Urine:450]    CBC:  Recent Labs     10/19/20  1930 10/20/20  0549   WBC 11.1 10.0   HGB 13.0 13.0   HCT 40.3* 41.0    281   .1*  --         BMP:  Recent Labs     10/19/20  1930 10/20/20  0549   * 137   K 4.4 4.4   CL 98 103   CO2 23 24   BUN 30* 23   CREATININE 1.51* 1.38*   GLUCOSE 280* 186*   CALCIUM 9.4 9.6        Coags:  Recent Labs     10/20/20  0549   INR 1.1       Lab Results   Component Value Date    SEDRATE 86 (H) 10/19/2020     Recent Labs     10/19/20  1930   .1*       Lower Extremity Physical Exam:  Vascular: DP and PT pulses are palpable. CFT <3 seconds to all digits. Hair growth is absent to the level of the digits. Non pitting edema left foot.       Neuro: Saph/sural/SP/DP/plantar sensation absent to light touch.     Musculoskeletal: Muscle strength is 5/5 to all lower extremity muscle groups. Gross deformity is absent. POP noted to first MPJ, subfirst met head left foot. Negative pain on calf squeeze bilaterally.   Able to dorsiflex and plantarflex hallux to full active range of motion left foot. All muscle compartments soft compressible.     Dermatologic: ulcer #1 located subfirst metatarsal head left foot and measures approximately 0.3 cm x 0.3 cm x 2.5 cm. Base is fibrotic. Periwound skin is macerated. Seropurulent drainage noted with no associated mal odor. Erythema present surrounding plantar first metatarsal head traveling dorsally to level of first MPJ with  associated increase in warmth. Does probe to bone, no sinus track, or undermine. induration noted without fluctuance or crepitus.        Ulcer #2 located sub first metatarsal head left foot distal to ulcer #1 and measures approximately 0.3 cm x 0.3 cm x 2.0 cm. Base is fibrogranular. Periwound skin is macerated. Scant serous drainage noted with no associated mal odor. Erythema present surrounding plantar first metatarsal head traveling dorsally to level of first MPJ with  associated increase in warmth. Does not probe to bone, sinus track, or undermine. No fluctuance, crepitus, or induration.        Ulcer #3 located sub medial proximal phalanx left foot measures approximately 0.3 cm x 0.3 cm x 1.5 cm. Base is fibrotic. Periwound skin is macerated. Scant serous drainage noted with no associated mal odor. Erythema present surrounding plantar first metatarsal head traveling dorsally to level of first MPJ with  associated increase in warmth. Does not probe to bone, sinus track, or undermine. induration noted without fluctuance or crepitus. Clinical Images:                  Imaging:   XR FOOT LEFT (MIN 3 VIEWS)   Final Result   Abnormality of the base of the 1st proximal phalanx. This appearance could   be caused by chronic infectious process or fracture.   MRI may be helpful for   better characterization         MRI FOOT LEFT WO CONTRAST    (Results Pending)   VL LOWER EXTREMITY ARTERIAL SEGMENTAL PRESSURES W PPG    (Results Pending)       Cultures:   Left wound cx swab (10/19/20)- Pending    Assessment   Maximiliano Armstrong is a 63 y.o. male with   1. Abscess left foot  2. TARA  3. Cellulitis left foot  4. Diabetic foot ulcer down to bone left foot  5. DM2 with peripheral neuropathy  6. Fracture vs OM of the 1st proximal phalanx    Principal Problem:    Non-healing ulcer of foot, left, with unspecified severity ([address]  Active Problems:    Type 2 diabetes mellitus with hyperglycemia, with long-term current use of insulin (HCC)    Diabetic autonomic neuropathy associated with type 2 diabetes mellitus ([address]    Obstructive sleep apnea    Class 3 severe obesity due to excess calories with serious comorbidity and body mass index (BMI) of 45.0 to 49.9 in [facility])    CKD (chronic kidney disease) stage 3, GFR 30-59 ml/min  Resolved Problems:    Diabetic ulcer of left foot associated with type 2 diabetes mellitus ([address]    Cellulitis of left lower extremity       Plan     · Patient examined and evaluated at bedside   · Treatment options discussed in detail with the patient  · High suspicion for OM vs abscess given the clinical picture and possible pathologic fracture of the 1st proximal phalanx. · MRI pending  · PVRs pending  · Abx- Cefepime/Zyvox. Recommend ID consult  · Dressing applied to Left foot: DSD, Kerlix, Ace.    · WBAT in surgical shoe to Left lower extremity  · Will discuss with Dr. Juan C Marie, [facility]   Podiatric Medicine & Surgery   10/20/2020 at 7:06 AM

## 2020-10-20 NOTE — ED PROVIDER NOTES
eMERGENCY dEPARTMENT eNCOUnter   Independent Attestation     Pt Name: Brenda Geronimo  MRN: [id number]  Liamgfcarmela 1960  Date of evaluation: 10/19/20     Brenda Geronimo is a 63 y.o. male with CC: Cellulitis (left foot)      Based on the medical record the care appears appropriate. I was personally available for consultation in the Emergency Department.     Pelon Wolf MD  Attending Emergency Physician                    Renata De La Garza MD  10/19/20 2032

## 2020-10-20 NOTE — PROGRESS NOTES
Patient admitted to room 2021 from ER. Patient stable at time of transfer. Patients belongings brought to room.

## 2020-10-20 NOTE — PLAN OF CARE
Problem: Falls - Risk of:  Goal: Will remain free from falls  Description: Will remain free from falls  10/20/2020 0945 by Desire Reyes RN  Outcome: Ongoing  Note: Room free of clutter  Hourly rounding   Non-skid socks worn  Side rails up x2  Bed low and locked  Call light in reach  Instructed to call out before getting out of bed  Anticipatory needs met  Bed alarm on  Falling star at the door and on wristband       Problem: Safety:  Goal: Free from accidental physical injury  Description: Free from accidental physical injury  10/20/2020 0945 by Desire Reyes RN  Outcome: Ongoing  Note: Proper pt identification  Hourly rounding performed  Anticipatory needs met  Non-skid socks worn  Proper transferring technique  2/4 side rails up  Personal necessities within reach  Bed low and locked  Call light in reach  Proper lighting  Room free of clutter  Continue to monitor         Problem: Daily Care:  Goal: Daily care needs are met  Description: Daily care needs are met  10/20/2020 0945 by Desire Reyes RN  Outcome: Ongoing  Note: Assess patient self-care activities  Encourage patient to perform ADL's as tolerated  Include family when possible       Problem: Pain:  Goal: Patient's pain/discomfort is manageable  Description: Patient's pain/discomfort is manageable  10/20/2020 0945 by Desire Reyes RN  Outcome: Ongoing  Note: Pain level assessed and rated on a 0-10 scale  Assess characteristics of pain  PRN pain medication given per pt request  Non-pharmacological interventions implemented  Report ineffective pain management to physician  Update pt and family of any changes  Pt instructed to call out with new onset of pain  Continue to monitor

## 2020-10-21 ENCOUNTER — ANESTHESIA (OUTPATIENT)
Dept: OPERATING ROOM | Age: 60
DRG: 854 | End: 2020-10-21
Payer: COMMERCIAL

## 2020-10-21 ENCOUNTER — ANESTHESIA EVENT (OUTPATIENT)
Dept: OPERATING ROOM | Age: 60
DRG: 854 | End: 2020-10-21
Payer: COMMERCIAL

## 2020-10-21 VITALS — SYSTOLIC BLOOD PRESSURE: 99 MMHG | OXYGEN SATURATION: 98 % | DIASTOLIC BLOOD PRESSURE: 53 MMHG | TEMPERATURE: 98.2 F

## 2020-10-21 LAB
ANION GAP SERPL CALCULATED.3IONS-SCNC: 7 MMOL/L (ref 9–17)
BUN BLDV-MCNC: 21 MG/DL (ref 8–23)
BUN/CREAT BLD: 15 (ref 9–20)
CALCIUM SERPL-MCNC: 9.5 MG/DL (ref 8.6–10.4)
CHLORIDE BLD-SCNC: 102 MMOL/L (ref 98–107)
CO2: 27 MMOL/L (ref 20–31)
CREAT SERPL-MCNC: 1.44 MG/DL (ref 0.7–1.2)
CULTURE: ABNORMAL
DIRECT EXAM: ABNORMAL
EKG ATRIAL RATE: 77 BPM
EKG P AXIS: 78 DEGREES
EKG P-R INTERVAL: 160 MS
EKG Q-T INTERVAL: 356 MS
EKG QRS DURATION: 92 MS
EKG QTC CALCULATION (BAZETT): 402 MS
EKG R AXIS: 88 DEGREES
EKG T AXIS: -10 DEGREES
EKG VENTRICULAR RATE: 77 BPM
GFR AFRICAN AMERICAN: >60 ML/MIN
GFR NON-AFRICAN AMERICAN: 50 ML/MIN
GFR SERPL CREATININE-BSD FRML MDRD: ABNORMAL ML/MIN/{1.73_M2}
GFR SERPL CREATININE-BSD FRML MDRD: ABNORMAL ML/MIN/{1.73_M2}
GLUCOSE BLD-MCNC: 162 MG/DL (ref 75–110)
GLUCOSE BLD-MCNC: 197 MG/DL (ref 75–110)
GLUCOSE BLD-MCNC: 212 MG/DL (ref 70–99)
GLUCOSE BLD-MCNC: 240 MG/DL (ref 75–110)
GLUCOSE BLD-MCNC: 330 MG/DL (ref 75–110)
HCT VFR BLD CALC: 38.6 % (ref 40.7–50.3)
HEMOGLOBIN: 12.5 G/DL (ref 13–17)
Lab: ABNORMAL
MCH RBC QN AUTO: 29.9 PG (ref 25.2–33.5)
MCHC RBC AUTO-ENTMCNC: 32.4 G/DL (ref 28.4–34.8)
MCV RBC AUTO: 92.3 FL (ref 82.6–102.9)
NRBC AUTOMATED: 0 PER 100 WBC
PDW BLD-RTO: 11.9 % (ref 11.8–14.4)
PLATELET # BLD: 293 K/UL (ref 138–453)
PMV BLD AUTO: 8.8 FL (ref 8.1–13.5)
POTASSIUM SERPL-SCNC: 4.9 MMOL/L (ref 3.7–5.3)
RBC # BLD: 4.18 M/UL (ref 4.21–5.77)
SODIUM BLD-SCNC: 136 MMOL/L (ref 135–144)
SPECIMEN DESCRIPTION: ABNORMAL
WBC # BLD: 9 K/UL (ref 3.5–11.3)

## 2020-10-21 PROCEDURE — 87070 CULTURE OTHR SPECIMN AEROBIC: CPT

## 2020-10-21 PROCEDURE — 0Y6Q0Z2 DETACHMENT AT LEFT 1ST TOE, MID, OPEN APPROACH: ICD-10-PCS | Performed by: PODIATRIST

## 2020-10-21 PROCEDURE — 3600000002 HC SURGERY LEVEL 2 BASE: Performed by: PODIATRIST

## 2020-10-21 PROCEDURE — 80048 BASIC METABOLIC PNL TOTAL CA: CPT

## 2020-10-21 PROCEDURE — 99232 SBSQ HOSP IP/OBS MODERATE 35: CPT | Performed by: INTERNAL MEDICINE

## 2020-10-21 PROCEDURE — 85027 COMPLETE CBC AUTOMATED: CPT

## 2020-10-21 PROCEDURE — 2580000003 HC RX 258: Performed by: NURSE PRACTITIONER

## 2020-10-21 PROCEDURE — 82947 ASSAY GLUCOSE BLOOD QUANT: CPT

## 2020-10-21 PROCEDURE — 87077 CULTURE AEROBIC IDENTIFY: CPT

## 2020-10-21 PROCEDURE — 87176 TISSUE HOMOGENIZATION CULTR: CPT

## 2020-10-21 PROCEDURE — 6360000002 HC RX W HCPCS: Performed by: NURSE ANESTHETIST, CERTIFIED REGISTERED

## 2020-10-21 PROCEDURE — 88311 DECALCIFY TISSUE: CPT

## 2020-10-21 PROCEDURE — 3700000001 HC ADD 15 MINUTES (ANESTHESIA): Performed by: PODIATRIST

## 2020-10-21 PROCEDURE — 2500000003 HC RX 250 WO HCPCS: Performed by: NURSE ANESTHETIST, CERTIFIED REGISTERED

## 2020-10-21 PROCEDURE — 6370000000 HC RX 637 (ALT 250 FOR IP): Performed by: STUDENT IN AN ORGANIZED HEALTH CARE EDUCATION/TRAINING PROGRAM

## 2020-10-21 PROCEDURE — 99254 IP/OBS CNSLTJ NEW/EST MOD 60: CPT | Performed by: INTERNAL MEDICINE

## 2020-10-21 PROCEDURE — 87075 CULTR BACTERIA EXCEPT BLOOD: CPT

## 2020-10-21 PROCEDURE — 2720000010 HC SURG SUPPLY STERILE: Performed by: PODIATRIST

## 2020-10-21 PROCEDURE — 2580000003 HC RX 258: Performed by: NURSE ANESTHETIST, CERTIFIED REGISTERED

## 2020-10-21 PROCEDURE — 2580000003 HC RX 258: Performed by: STUDENT IN AN ORGANIZED HEALTH CARE EDUCATION/TRAINING PROGRAM

## 2020-10-21 PROCEDURE — 87076 CULTURE ANAEROBE IDENT EACH: CPT

## 2020-10-21 PROCEDURE — 1200000000 HC SEMI PRIVATE

## 2020-10-21 PROCEDURE — 36415 COLL VENOUS BLD VENIPUNCTURE: CPT

## 2020-10-21 PROCEDURE — 0QBP0ZX EXCISION OF LEFT METATARSAL, OPEN APPROACH, DIAGNOSTIC: ICD-10-PCS | Performed by: PODIATRIST

## 2020-10-21 PROCEDURE — 7100000001 HC PACU RECOVERY - ADDTL 15 MIN: Performed by: PODIATRIST

## 2020-10-21 PROCEDURE — 87186 SC STD MICRODIL/AGAR DIL: CPT

## 2020-10-21 PROCEDURE — 88304 TISSUE EXAM BY PATHOLOGIST: CPT

## 2020-10-21 PROCEDURE — 2709999900 HC NON-CHARGEABLE SUPPLY: Performed by: PODIATRIST

## 2020-10-21 PROCEDURE — 88305 TISSUE EXAM BY PATHOLOGIST: CPT

## 2020-10-21 PROCEDURE — 2500000003 HC RX 250 WO HCPCS: Performed by: PODIATRIST

## 2020-10-21 PROCEDURE — 93005 ELECTROCARDIOGRAM TRACING: CPT | Performed by: INTERNAL MEDICINE

## 2020-10-21 PROCEDURE — 88307 TISSUE EXAM BY PATHOLOGIST: CPT

## 2020-10-21 PROCEDURE — 3600000012 HC SURGERY LEVEL 2 ADDTL 15MIN: Performed by: PODIATRIST

## 2020-10-21 PROCEDURE — 6360000002 HC RX W HCPCS: Performed by: NURSE PRACTITIONER

## 2020-10-21 PROCEDURE — 87205 SMEAR GRAM STAIN: CPT

## 2020-10-21 PROCEDURE — 7100000000 HC PACU RECOVERY - FIRST 15 MIN: Performed by: PODIATRIST

## 2020-10-21 PROCEDURE — 6360000002 HC RX W HCPCS: Performed by: STUDENT IN AN ORGANIZED HEALTH CARE EDUCATION/TRAINING PROGRAM

## 2020-10-21 PROCEDURE — 93010 ELECTROCARDIOGRAM REPORT: CPT | Performed by: INTERNAL MEDICINE

## 2020-10-21 PROCEDURE — 86403 PARTICLE AGGLUT ANTBDY SCRN: CPT

## 2020-10-21 PROCEDURE — 86140 C-REACTIVE PROTEIN: CPT

## 2020-10-21 PROCEDURE — 3700000000 HC ANESTHESIA ATTENDED CARE: Performed by: PODIATRIST

## 2020-10-21 RX ORDER — ONDANSETRON 2 MG/ML
4 INJECTION INTRAMUSCULAR; INTRAVENOUS
Status: DISCONTINUED | OUTPATIENT
Start: 2020-10-21 | End: 2020-10-21 | Stop reason: HOSPADM

## 2020-10-21 RX ORDER — PROPOFOL 10 MG/ML
INJECTION, EMULSION INTRAVENOUS CONTINUOUS PRN
Status: DISCONTINUED | OUTPATIENT
Start: 2020-10-21 | End: 2020-10-21 | Stop reason: SDUPTHER

## 2020-10-21 RX ORDER — OXYCODONE HYDROCHLORIDE 5 MG/1
5 TABLET ORAL EVERY 4 HOURS PRN
Status: DISCONTINUED | OUTPATIENT
Start: 2020-10-21 | End: 2020-10-29 | Stop reason: HOSPADM

## 2020-10-21 RX ORDER — KETAMINE HCL IN NACL, ISO-OSM 100MG/10ML
SYRINGE (ML) INJECTION PRN
Status: DISCONTINUED | OUTPATIENT
Start: 2020-10-21 | End: 2020-10-21 | Stop reason: SDUPTHER

## 2020-10-21 RX ORDER — HYDROMORPHONE HCL 110MG/55ML
0.25 PATIENT CONTROLLED ANALGESIA SYRINGE INTRAVENOUS EVERY 5 MIN PRN
Status: DISCONTINUED | OUTPATIENT
Start: 2020-10-21 | End: 2020-10-21 | Stop reason: HOSPADM

## 2020-10-21 RX ORDER — FENTANYL CITRATE 50 UG/ML
25 INJECTION, SOLUTION INTRAMUSCULAR; INTRAVENOUS EVERY 5 MIN PRN
Status: DISCONTINUED | OUTPATIENT
Start: 2020-10-21 | End: 2020-10-21 | Stop reason: HOSPADM

## 2020-10-21 RX ORDER — LIDOCAINE HYDROCHLORIDE 10 MG/ML
INJECTION, SOLUTION EPIDURAL; INFILTRATION; INTRACAUDAL; PERINEURAL PRN
Status: DISCONTINUED | OUTPATIENT
Start: 2020-10-21 | End: 2020-10-21 | Stop reason: HOSPADM

## 2020-10-21 RX ORDER — OXYCODONE HYDROCHLORIDE 5 MG/1
10 TABLET ORAL EVERY 4 HOURS PRN
Status: DISCONTINUED | OUTPATIENT
Start: 2020-10-21 | End: 2020-10-29 | Stop reason: HOSPADM

## 2020-10-21 RX ORDER — SODIUM CHLORIDE 9 MG/ML
INJECTION, SOLUTION INTRAVENOUS CONTINUOUS PRN
Status: DISCONTINUED | OUTPATIENT
Start: 2020-10-21 | End: 2020-10-21 | Stop reason: SDUPTHER

## 2020-10-21 RX ORDER — BUPIVACAINE HYDROCHLORIDE 5 MG/ML
INJECTION, SOLUTION EPIDURAL; INTRACAUDAL PRN
Status: DISCONTINUED | OUTPATIENT
Start: 2020-10-21 | End: 2020-10-21 | Stop reason: HOSPADM

## 2020-10-21 RX ORDER — FENTANYL CITRATE 50 UG/ML
50 INJECTION, SOLUTION INTRAMUSCULAR; INTRAVENOUS EVERY 5 MIN PRN
Status: DISCONTINUED | OUTPATIENT
Start: 2020-10-21 | End: 2020-10-21 | Stop reason: HOSPADM

## 2020-10-21 RX ORDER — LIDOCAINE HYDROCHLORIDE 20 MG/ML
INJECTION, SOLUTION EPIDURAL; INFILTRATION; INTRACAUDAL; PERINEURAL PRN
Status: DISCONTINUED | OUTPATIENT
Start: 2020-10-21 | End: 2020-10-21 | Stop reason: SDUPTHER

## 2020-10-21 RX ORDER — HYDROMORPHONE HCL 110MG/55ML
0.5 PATIENT CONTROLLED ANALGESIA SYRINGE INTRAVENOUS EVERY 5 MIN PRN
Status: DISCONTINUED | OUTPATIENT
Start: 2020-10-21 | End: 2020-10-21 | Stop reason: HOSPADM

## 2020-10-21 RX ADMIN — HEPARIN SODIUM 5000 UNITS: 5000 INJECTION INTRAVENOUS; SUBCUTANEOUS at 13:21

## 2020-10-21 RX ADMIN — OXYCODONE HYDROCHLORIDE 10 MG: 5 TABLET ORAL at 16:52

## 2020-10-21 RX ADMIN — ACETAMINOPHEN 650 MG: 325 TABLET ORAL at 12:25

## 2020-10-21 RX ADMIN — MAGNESIUM GLUCONATE 500 MG ORAL TABLET 400 MG: 500 TABLET ORAL at 12:25

## 2020-10-21 RX ADMIN — INSULIN LISPRO 2 UNITS: 100 INJECTION, SOLUTION INTRAVENOUS; SUBCUTANEOUS at 12:26

## 2020-10-21 RX ADMIN — SODIUM CHLORIDE: 9 INJECTION, SOLUTION INTRAVENOUS at 09:06

## 2020-10-21 RX ADMIN — PROPOFOL 150 MCG/KG/MIN: 10 INJECTION, EMULSION INTRAVENOUS at 09:10

## 2020-10-21 RX ADMIN — ALLOPURINOL 300 MG: 300 TABLET ORAL at 12:25

## 2020-10-21 RX ADMIN — Medication 30 MG: at 09:10

## 2020-10-21 RX ADMIN — HEPARIN SODIUM 5000 UNITS: 5000 INJECTION INTRAVENOUS; SUBCUTANEOUS at 20:03

## 2020-10-21 RX ADMIN — Medication 2 MG: at 06:17

## 2020-10-21 RX ADMIN — LIDOCAINE HYDROCHLORIDE 100 MG: 20 INJECTION, SOLUTION EPIDURAL; INFILTRATION; INTRACAUDAL; PERINEURAL at 09:10

## 2020-10-21 RX ADMIN — LINEZOLID 600 MG: 600 INJECTION, SOLUTION INTRAVENOUS at 12:32

## 2020-10-21 RX ADMIN — LINEZOLID 600 MG: 600 INJECTION, SOLUTION INTRAVENOUS at 21:34

## 2020-10-21 RX ADMIN — OXYCODONE HYDROCHLORIDE 10 MG: 5 TABLET ORAL at 21:34

## 2020-10-21 RX ADMIN — INSULIN GLARGINE 30 UNITS: 100 INJECTION, SOLUTION SUBCUTANEOUS at 21:35

## 2020-10-21 RX ADMIN — CEFEPIME HYDROCHLORIDE 2 G: 2 INJECTION, POWDER, FOR SOLUTION INTRAVENOUS at 08:03

## 2020-10-21 RX ADMIN — SODIUM CHLORIDE, PRESERVATIVE FREE 10 ML: 5 INJECTION INTRAVENOUS at 20:00

## 2020-10-21 RX ADMIN — OXYCODONE HYDROCHLORIDE 10 MG: 5 TABLET ORAL at 12:25

## 2020-10-21 RX ADMIN — PROPOFOL: 10 INJECTION, EMULSION INTRAVENOUS at 09:55

## 2020-10-21 RX ADMIN — MULTIPLE VITAMINS W/ MINERALS TAB 1 TABLET: TAB at 12:25

## 2020-10-21 RX ADMIN — CEFEPIME HYDROCHLORIDE 2 G: 2 INJECTION, POWDER, FOR SOLUTION INTRAVENOUS at 20:00

## 2020-10-21 RX ADMIN — INSULIN LISPRO 8 UNITS: 100 INJECTION, SOLUTION INTRAVENOUS; SUBCUTANEOUS at 16:52

## 2020-10-21 RX ADMIN — INSULIN LISPRO 2 UNITS: 100 INJECTION, SOLUTION INTRAVENOUS; SUBCUTANEOUS at 21:35

## 2020-10-21 RX ADMIN — ACETAMINOPHEN 650 MG: 325 TABLET ORAL at 20:00

## 2020-10-21 RX ADMIN — GABAPENTIN 300 MG: 300 CAPSULE ORAL at 20:00

## 2020-10-21 RX ADMIN — Medication 20 MG: at 09:12

## 2020-10-21 ASSESSMENT — PULMONARY FUNCTION TESTS
PIF_VALUE: 1
PIF_VALUE: 0
PIF_VALUE: 1
PIF_VALUE: 0
PIF_VALUE: 1
PIF_VALUE: 0
PIF_VALUE: 1

## 2020-10-21 ASSESSMENT — PAIN SCALES - GENERAL
PAINLEVEL_OUTOF10: 0
PAINLEVEL_OUTOF10: 2
PAINLEVEL_OUTOF10: 0
PAINLEVEL_OUTOF10: 0
PAINLEVEL_OUTOF10: 3
PAINLEVEL_OUTOF10: 0
PAINLEVEL_OUTOF10: 0
PAINLEVEL_OUTOF10: 7
PAINLEVEL_OUTOF10: 0
PAINLEVEL_OUTOF10: 0
PAINLEVEL_OUTOF10: 7
PAINLEVEL_OUTOF10: 4
PAINLEVEL_OUTOF10: 7

## 2020-10-21 ASSESSMENT — PAIN DESCRIPTION - PAIN TYPE
TYPE: ACUTE PAIN;SURGICAL PAIN
TYPE: ACUTE PAIN
TYPE: ACUTE PAIN
TYPE: ACUTE PAIN;SURGICAL PAIN

## 2020-10-21 ASSESSMENT — PAIN DESCRIPTION - LOCATION
LOCATION: FOOT
LOCATION: HEAD
LOCATION: FOOT
LOCATION: FOOT

## 2020-10-21 ASSESSMENT — PAIN DESCRIPTION - DESCRIPTORS
DESCRIPTORS: HEADACHE
DESCRIPTORS: BURNING;PRESSURE
DESCRIPTORS: DULL;SORE
DESCRIPTORS: BURNING;PRESSURE

## 2020-10-21 ASSESSMENT — PAIN DESCRIPTION - ONSET
ONSET: GRADUAL
ONSET: GRADUAL

## 2020-10-21 ASSESSMENT — PAIN DESCRIPTION - ORIENTATION
ORIENTATION: LEFT

## 2020-10-21 ASSESSMENT — PAIN DESCRIPTION - FREQUENCY
FREQUENCY: INTERMITTENT
FREQUENCY: INTERMITTENT

## 2020-10-21 ASSESSMENT — PAIN - FUNCTIONAL ASSESSMENT
PAIN_FUNCTIONAL_ASSESSMENT: ACTIVITIES ARE NOT PREVENTED
PAIN_FUNCTIONAL_ASSESSMENT: PREVENTS OR INTERFERES SOME ACTIVE ACTIVITIES AND ADLS

## 2020-10-21 NOTE — ANESTHESIA PRE PROCEDURE
Department of Anesthesiology  Preprocedure Note       Name:  Jun Crew   Age:  63 y.o.  :  1960                                          MRN:  [id number]         Date:  10/21/2020      Surgeon: Lidia Fields):  Stepan Buckley DPM    Procedure: Procedure(s):  LEFT PARTIAL FIRST RAY  AMPUTATION    Medications prior to admission:   Prior to Admission medications    Medication Sig Start Date End Date Taking? Authorizing Provider   insulin glargine (LANTUS) 100 UNIT/ML injection vial Inject 60 Units into the skin 2 times daily Patient takes 30 units in the AM and 30 units in the PM   Yes Historical Provider, MD   melatonin 5 MG TABS tablet Take 5-10 mg by mouth daily as needed (sleep)   Yes Historical Provider, MD   fluticasone (FLONASE) 50 MCG/ACT nasal spray 1-2 sprays by Each Nare route daily 2 sprays in right nostril and 1 spray in the left nostril   Yes Historical Provider, MD   Multiple Vitamins-Minerals (THERAPEUTIC MULTIVITAMIN-MINERALS) tablet Take 1 tablet by mouth daily   Yes Historical Provider, MD   Lidocaine 4 % GEL Apply topically 2 times daily as needed (hand pain) Uses before and after work   Yes Historical Provider, MD   gabapentin (NEURONTIN) 300 MG capsule Take 300 mg by mouth 2 times daily. .   Yes Historical Provider, MD   glipiZIDE-metformin (METAGLIP) 5-500 MG per tablet Take 2 tablets by mouth 2 times daily    Yes Historical Provider, MD   allopurinol (ZYLOPRIM) 300 MG tablet Take 300 mg by mouth daily   Yes Historical Provider, MD   diclofenac (CATAFLAM) 50 MG tablet Take 75 mg by mouth 2 times daily   Yes Historical Provider, MD   zaleplon (SONATA) 10 MG capsule Take 10 mg by mouth nightly   Yes Historical Provider, MD   Handicap Placard MISC by Does not apply route Dispense one temporary handicap placard for six months for ease of transportation 19   Mariana Duncan DPM   magnesium (MAGNESIUM-OXIDE) 250 MG TABS tablet Take 250 mg by mouth daily    Historical Provider, MD Liraglutide (VICTOZA) 18 MG/3ML SOPN SC injection Inject 1.8 mg into the skin daily     Historical Provider, MD       Current medications:    Current Facility-Administered Medications   Medication Dose Route Frequency Provider Last Rate Last Dose    LORazepam (ATIVAN) tablet 0.5 mg  0.5 mg Oral Nightly PRN Liana Mince, APRN - CNP   0.5 mg at 10/20/20 2229    insulin lispro (HUMALOG) injection vial 0-12 Units  0-12 Units Subcutaneous TID WC Fredda Prude, APRN - CNP   4 Units at 10/20/20 1805    insulin lispro (HUMALOG) injection vial 0-6 Units  0-6 Units Subcutaneous Nightly Fredda Prude, APRN - CNP   4 Units at 10/20/20 2150    allopurinol (ZYLOPRIM) tablet 300 mg  300 mg Oral Daily Liana Mince, APRN - CNP   300 mg at 10/20/20 2460    gabapentin (NEURONTIN) capsule 300 mg  300 mg Oral BID Liana Mince, APRN - CNP   300 mg at 10/20/20 2151    insulin glargine (LANTUS) injection vial 30 Units  30 Units Subcutaneous BID Liana Mince, APRN - CNP   30 Units at 10/20/20 2151    magnesium oxide (MAG-OX) tablet 400 mg  400 mg Oral Daily Liana Mince, APRN - CNP   400 mg at 10/20/20 0849    melatonin tablet 6 mg  6 mg Oral Daily PRN Liana Mince, APRN - CNP        therapeutic multivitamin-minerals 1 tablet  1 tablet Oral Daily Liana Mince, APRN - CNP   1 tablet at 10/20/20 9706    sodium chloride flush 0.9 % injection 10 mL  10 mL Intravenous 2 times per day Liana Mince, APRN - CNP        sodium chloride flush 0.9 % injection 10 mL  10 mL Intravenous PRN Liana Mince, APRN - CNP        potassium chloride (KLOR-CON M) extended release tablet 40 mEq  40 mEq Oral PRN Liana Mince, APRN - CNP        Or    potassium bicarb-citric acid (EFFER-K) effervescent tablet 40 mEq  40 mEq Oral PRN Liana Mince, APRN - CNP        Or    potassium chloride 10 mEq/100 mL IVPB (Peripheral Line)  10 mEq Intravenous PRN Liana Mince, APRN - CNP        magnesium sulfate 1 g in dextrose 5% 100 mL IVPB  1 g Intravenous PRN Raenette Cable, APRN - CNP        acetaminophen (TYLENOL) tablet 650 mg  650 mg Oral Q6H PRN Raenette Cable, APRN - CNP        Or    acetaminophen (TYLENOL) suppository 650 mg  650 mg Rectal Q6H PRN Raenette Cable, APRN - CNP        polyethylene glycol (GLYCOLAX) packet 17 g  17 g Oral Daily PRN Raenette Cable, APRN - CNP        promethazine (PHENERGAN) tablet 12.5 mg  12.5 mg Oral Q6H PRN Raenette Cable, APRN - CNP        Or    ondansetron [facility]) injection 4 mg  4 mg Intravenous Q6H PRN Raenette Cable, APRN - CNP        nicotine (NICODERM CQ) 21 MG/24HR 1 patch  1 patch Transdermal Daily PRN Raenette Cable, APRN - CNP        glucose (GLUTOSE) 40 % oral gel 15 g  15 g Oral PRN Raenette Cable, APRN - CNP        dextrose 50 % IV solution  12.5 g Intravenous PRN Raenette Cable, APRN - CNP        glucagon (rDNA) injection 1 mg  1 mg Intramuscular PRN Raenette Cable, APRN - CNP        dextrose 5 % solution  100 mL/hr Intravenous PRN Raenette Cable, APRN - CNP        cefepime (MAXIPIME) 2 g IVPB minibag  2 g Intravenous Q12H Raenette Cable, APRN - CNP   Stopped at 10/20/20 2013    linezolid (ZYVOX) IVPB 600 mg  600 mg Intravenous Q12H Raenette Cable, APRN - CNP   Stopped at 10/21/20 0019    heparin (porcine) injection 5,000 Units  5,000 Units Subcutaneous 3 times per day Raenette Cable, APRN - CNP   5,000 Units at 10/20/20 2151    morphine (PF) injection 2 mg  2 mg Intravenous Q4H PRN Raenette Cable, APRN - CNP   2 mg at 10/21/20 7057       Allergies:  No Known Allergies    Problem List:    Patient Active Problem List   Diagnosis Code    Diabetes mellitus type 2 in obese ([facility]) E11.69, E66.9    Chronic ulcer of left foot with fat layer exposed ([facility] L97.522    Type 2 diabetes mellitus with hyperglycemia, with long-term current use of insulin ([facility]) E11.65, Z79.4    Ulcer of toe of left foot ([facility] L97.529    Diabetic autonomic neuropathy associated with type 2 diabetes mellitus ([facility] E11.43    Type 2 diabetes mellitus with diabetic peripheral angiopathy without gangrene ([address] E11.51    Diabetic foot infection ([address] E11.628, L08.9    Obstructive sleep apnea G47.33    Class 3 severe obesity due to excess calories with serious comorbidity and body mass index (BMI) of 45.0 to 49.9 in adult ([facility]) E66.01, Z68.42    High anion gap metabolic acidosis J69.8    CKD (chronic kidney disease) stage 3, GFR 30-59 ml/min N18.30    Diabetes mellitus due to underlying condition with diabetic neuropathy, without long-term current use of insulin ([facility]) E08.40    Abscess of foot L02.619    Bacterial infection due to Morganella morganii A49.8    Non-healing ulcer of foot, left, with unspecified severity ([facility] [address] L97.529       Past Medical History:        Diagnosis Date    Chronic kidney disease     Diabetes mellitus ([address]     Diabetic neuropathy ([facility]     History of kidney stones     Sleep apnea     uses Bi-Pap nightly       Past Surgical History:        Procedure Laterality Date    APPENDECTOMY      FOOT DEBRIDEMENT Left 01/18/2016    FOOT DEBRIDEMENT Left 2/19/2019    LEFT FOOT   INCISION AND DRAINAGE WITH WOUND VAC APPLICATION performed by Gale Drew DPM at [address] [place] [address] Left 3/12/2019    LEFT FOOT WOUND DEBRIDEMENT WITH THERASKIN performed by Gale Drew DPM at [address] [place] [address] Left 00/90/6082    Application of 44 units of Apligraf biologic graft, left foot    FOOT SURGERY Left 02/19/2019    LEFT FOOT   INCISION AND DRAINAGE WITH WOUND VAC APPLICATION (Left )    FOOT SURGERY Left 2/22/2019    DELAYED PRIMARY CLOSURE LEFT FOOT      PULSEVAC performed by Gale Drew DPM at [facility] Left 03/12/2019    wound debridement    LITHOTRIPSY  2013    NASAL SEPTUM SURGERY      OTHER SURGICAL HISTORY Left 04/03/2019    foot debridement and eppifix application    SKIN GRAFT Left 4/3/2019    THERASKIN AND EPIFIX APPLICATION  AND  LEFT FOOT WOUND DEBRIDEMENT AND WOUND PREP performed by Roger Medina DPM at [facility] Left 4/18/2019    LEFT FOOT APPLICATION APPLIGRAFT performed by Roger Medina DPM at [address] [place] [address] Left 5/1/5334    GRAFT APPLICATION LEFT FOOT - WCB performed by Roger Medina DPM at [facility] Left 1/2016    foot       Social History:    Social History     Tobacco Use    Smoking status: Never Smoker    Smokeless tobacco: Never Used   Substance Use Topics    Alcohol use: Yes     Alcohol/week: 0.0 standard drinks     Comment: 1-2 cans beer daily                                Counseling given: Not Answered      Vital Signs (Current):   Vitals:    10/20/20 0716 10/20/20 1417 10/20/20 1943 10/21/20 0724   BP: 123/68  (!) 115/56 110/63   Pulse: 81  73 79   Resp: 17   17   Temp: 98.6 °F (37 °C)  98.2 °F (36.8 °C) 98 °F (36.7 °C)   TempSrc: Oral  Oral Oral   SpO2: 93%  94% 94%   Weight:       Height:  6' 4\" (1.93 m)                                                BP Readings from Last 3 Encounters:   10/21/20 110/63   05/07/19 (!) 145/70   04/18/19 121/64       NPO Status: Time of last liquid consumption: 2350                        Time of last solid consumption: 2030                        Date of last liquid consumption: 10/20/20                        Date of last solid food consumption: 10/20/20    BMI:   Wt Readings from Last 3 Encounters:   10/20/20 (!) 360 lb 9.6 oz (163.6 kg)   05/07/19 (!) 371 lb 11.2 oz (168.6 kg)   04/18/19 (!) 367 lb 1.6 oz (166.5 kg)     Body mass index is 43.89 kg/m².     CBC:   Lab Results   Component Value Date    WBC 9.0 10/21/2020    RBC 4.18 10/21/2020    RBC 4.85 09/23/2011    HGB 12.5 10/21/2020    HCT 38.6 10/21/2020    MCV 92.3 10/21/2020    RDW 11.9 10/21/2020     10/21/2020     09/23/2011       CMP:   Lab Results   Component Value Date     10/21/2020    K 4.9 10/21/2020     10/21/2020    CO2 27 10/21/2020    BUN 21 10/21/2020    CREATININE 1.44 10/21/2020    GFRAA >60 10/21/2020    LABGLOM 50 10/21/2020    GLUCOSE 212 10/21/2020    GLUCOSE 198 09/23/2011    PROT 7.5 10/26/2015    CALCIUM 9.5 10/21/2020    ALKPHOS 45 01/13/2016    ALT 39 01/13/2016       POC Tests:   Recent Labs     10/20/20  2046   POCGLU 312*       Coags:   Lab Results   Component Value Date    PROTIME 13.9 10/20/2020    INR 1.1 10/20/2020       HCG (If Applicable): No results found for: PREGTESTUR, PREGSERUM, HCG, HCGQUANT     ABGs: No results found for: PHART, PO2ART, TYD3VVI, CBG6EQW, BEART, U0ZPWTOI     Type & Screen (If Applicable):  No results found for: LABABO, LABRH    Drug/Infectious Status (If Applicable):  No results found for: HIV, HEPCAB    COVID-19 Screening (If Applicable):   Lab Results   Component Value Date    COVID19 Not Detected 10/19/2020         Anesthesia Evaluation   no history of anesthetic complications:   Airway: Mallampati: III  TM distance: >3 FB   Neck ROM: full  Mouth opening: > = 3 FB Dental:          Pulmonary:   (+) sleep apnea (on nocturnal BiPAP):                             Cardiovascular:  Exercise tolerance: good (>4 METS),       (-)  angina and  GONZALEZ       Beta Blocker:  Not on Beta Blocker         Neuro/Psych:               GI/Hepatic/Renal:   (+) renal disease: CRI, morbid obesity          Endo/Other:    (+) DiabetesType II DM, , .                 Abdominal:           Vascular:                                  Anesthesia Plan      MAC     ASA 3       Induction: intravenous. Anesthetic plan and risks discussed with patient.                       Jayden Bauman MD   10/21/2020

## 2020-10-21 NOTE — PROGRESS NOTES
Glenn served to Dr Dai regarding needing medical clearance, he has ordered stat EKG and is being done

## 2020-10-21 NOTE — PLAN OF CARE
Problem: Infection:  Goal: Will remain free from infection  Description: Will remain free from infection  Outcome: Ongoing     Problem: Safety:  Goal: Free from accidental physical injury  Description: Free from accidental physical injury  Outcome: Ongoing     Problem: Daily Care:  Goal: Daily care needs are met  Description: Daily care needs are met  Outcome: Ongoing     Problem: Pain:  Goal: Patient's pain/discomfort is manageable  Description: Patient's pain/discomfort is manageable  Outcome: Ongoing     Problem: Skin Integrity:  Goal: Skin integrity will stabilize  Description: Skin integrity will stabilize  Outcome: Ongoing

## 2020-10-21 NOTE — PLAN OF CARE
Problem: Falls - Risk of:  Goal: Will remain free from falls  Description: Will remain free from falls  Outcome: Ongoing  Note: Patient is a fall risk during this admission. Fall risk assessment was performed. Patient is absent of falls. Bed is in the lowest position. Wheels on the bed are locked. Call light and bed side table are within reach. Clutter is removed. Patient was educated to call out when needing assistance or wanting to get out of bed. Patient offered toileting assistance during rounding. Hourly rounds have been performed. Fall precautions in place, pt  up independently with slow steady gait, reminded to use ortho shoe but not compliant     Problem: Infection:  Goal: Will remain free from infection  Description: Will remain free from infection  10/21/2020 1125 by Sarah Champion RN  Outcome: Ongoing  Note: Iv antibiotics cont   10/21/2020 0555 by Lamar Morgan RN  Outcome: Ongoing     Problem: Safety:  Goal: Free from accidental physical injury  Description: Free from accidental physical injury  10/21/2020 1125 by Sarah Champion RN  Outcome: Ongoing  10/21/2020 0555 by Lamar Morgan RN  Outcome: Ongoing     Problem: Daily Care:  Goal: Daily care needs are met  Description: Daily care needs are met  10/21/2020 1125 by Sarah Champion RN  Outcome: Ongoing  10/21/2020 0555 by Lamar Morgan RN  Outcome: Ongoing     Problem: Pain:  Goal: Patient's pain/discomfort is manageable  Description: Patient's pain/discomfort is manageable  10/21/2020 1125 by Sarah Champion RN  Outcome: Ongoing  Note: Pain level assessment complete.    Patient educated on pain scale and control interventions  PRN pain medication given per patient request  Patient instructed to call out with new onset of pain or unrelieved pain   10/21/2020 0555 by Lamar Morgan RN  Outcome: Ongoing     Problem: Skin Integrity:  Goal: Skin integrity will stabilize  Description: Skin integrity will stabilize  10/21/2020 1125 by Sarah Champion RN  Outcome: Ongoing  Note: Having surgery today to further treat left foot ulcer  10/21/2020 0555 by Gisel Davenport RN  Outcome: Ongoing     Problem: Discharge Planning:  Goal: Patients continuum of care needs are met  Description: Patients continuum of care needs are met  Outcome: Ongoing     Problem: Nutrition  Goal: Optimal nutrition therapy  Outcome: Ongoing     Problem: Discharge Planning:  Goal: Discharged to appropriate level of care  Description: Discharged to appropriate level of care  Outcome: Ongoing     Problem: Serum Glucose Level - Abnormal:  Goal: Ability to maintain appropriate glucose levels will improve  Description: Ability to maintain appropriate glucose levels will improve  Outcome: Ongoing  Note: Checking blood sugars before meals and at bedtime and giving insulins as ordered     Problem: Sensory Perception - Impaired:  Goal: Ability to maintain a stable neurologic state will improve  Description: Ability to maintain a stable neurologic state will improve  Outcome: Ongoing

## 2020-10-21 NOTE — CONSULTS
[facility]  Initial Consult Note  Date: 10/21/2020    Hospital day :2     Impression:   1. Left-sided diabetic foot ulcer with associated infection/osteomyelitis  · Status post great toe amputation and bone biopsy of the first metatarsal 10/21/2020  2. Diabetes mellitus type 2 with associated neuropathy. 3. Diabetic nephropathy with chronic kidney disease stage III. 4. Obstructive sleep apnea with nightly BiPAP use. 5. Morbid obesity    Recommendations   · The wound culture Gram stain with gram-positive cocci as well as gram-negative rods and the patient will continue on cefepime and Zyvox. · We will follow the culture data and the operative/bone culture data as well. · It is my understanding that the surgical wound has been left open and the plan is for further debridement later on in the week. · The patient did have a biopsy of the first metatarsal done and we will follow the surgical pathology. Chief complaint/reason for consultation:   Left-sided diabetic foot ulcer with associated infection and osteomyelitis    History of Present Illness:   Brenda Geronimo is a 63y.o.-year-old male who was initially admitted on 10/19/2020. Chetan Sims has a history of diabetes mellitus type 2 with associated diabetic neuropathy, morbid obesity, obstructive sleep apnea on CPAP, chronic kidney disease and he has had chronic issues with ulcerations on the plantar aspect of his left foot by the subfirst metatarsal.  The patient has been following with wound care and the ulceration has been present for at least a year. The patient was hospitalized here in February 2019 with a left-sided diabetic foot infection secondary to Morganella morganii as well as Enterococcus faecalis. The patient reports that the wound had healed/closed about 2 weeks ago.   He subsequently developed soft tissue swelling and an abscess which started to drain spontaneously and he reports having 2 open wounds on the plantar THERASKIN performed by Gume Massey DPM at [facility] Left 81/21/3358    Application of 44 units of Apligraf biologic graft, left foot    FOOT SURGERY Left 02/19/2019    LEFT FOOT   INCISION AND DRAINAGE WITH WOUND VAC APPLICATION (Left )    FOOT SURGERY Left 2/22/2019    DELAYED PRIMARY CLOSURE LEFT FOOT      PULSEVAC performed by Gume Massey DPM at [address] [place] [address] [facility] Left 03/12/2019    wound debridement    LITHOTRIPSY  2013    NASAL SEPTUM SURGERY      OTHER SURGICAL HISTORY Left 04/03/2019    foot debridement and eppifix application    SKIN GRAFT Left 4/3/2019    THERASKIN AND EPIFIX APPLICATION  AND  LEFT FOOT WOUND DEBRIDEMENT AND WOUND PREP performed by Gume Massey DPM at [facility] [address]. Left 4/18/2019    LEFT FOOT APPLICATION APPLIGRAFT performed by Gume Massey DPM at [facility] [address]. Left 8/4/7182    GRAFT APPLICATION LEFT FOOT - WCB performed by Gume Massey DPM at [facility] Left 1/2016    foot    TOE AMPUTATION Left 10/21/2020    LEFT PARTIAL FIRST RAY  AMPUTATION performed by Simi Craig DPM at [facility] OR     Medications:      insulin lispro  0-12 Units Subcutaneous TID WC    insulin lispro  0-6 Units Subcutaneous Nightly    allopurinol  300 mg Oral Daily    gabapentin  300 mg Oral BID    insulin glargine  30 Units Subcutaneous BID    magnesium oxide  400 mg Oral Daily    therapeutic multivitamin-minerals  1 tablet Oral Daily    sodium chloride flush  10 mL Intravenous 2 times per day    cefepime  2 g Intravenous Q12H    linezolid  600 mg Intravenous Q12H    heparin (porcine)  5,000 Units Subcutaneous 3 times per day     Social History:     Social History     Socioeconomic History    Marital status: Single     Spouse name: Not on file    Number of children: Not on file    Years of education: Not on file    Highest education level: Not on file   Occupational History    Not on file   Social Needs    Financial resource kg/m²     Temperature Range: Temp: 98.6 °F (37 °C) Temp  Av.8 °F (37.1 °C)  Min: 97.3 °F (36.3 °C)  Max: 99.9 °F (37.7 °C)  General Appearance: Awake, alert, and in no apparent distress  Head: Normocephalic, without obvious abnormality, atraumatic  Eyes: Pupils equal, round, reactive, to light and accommodation; extraocular movements intact; sclera anicteric; conjunctivae pink  ENT: Oropharynx clear, without erythema, exudate, or thrush. Neck: Supple, without lymphadenopathy. Pulmonary/Chest: Clear to auscultation, without wheezes, rales, or rhonchi  Cardiovascular: Regular rate and rhythm without murmurs, rubs, or gallops. Abdomen: Soft, nontender, nondistended. Obese abdomen  Extremities: No cyanosis, clubbing, edema, or effusions. The left foot is a surgical dressing in place and there is some bleeding on the plantar aspect of the foot at the site of the amputation. Neurologic: No gross sensory or motor deficits. Skin: Warm and dry with no rash. Medical Decision Making:   I have independently reviewed/ordered the following labs:  CBC with Differential:   Recent Labs     10/19/20  1930 10/20/20  0549 10/21/20  0453   WBC 11.1 10.0 9.0   HGB 13.0 13.0 12.5*   HCT 40.3* 41.0 38.6*    281 293   LYMPHOPCT 12*  --   --    MONOPCT 10  --   --      BMP:   Recent Labs     10/20/20  0549 10/21/20  0453    136   K 4.4 4.9    102   CO2 24 27   BUN 23 21   CREATININE 1.38* 1.44*     Hepatic Function Panel: No results for input(s): PROT, LABALBU, BILIDIR, IBILI, BILITOT, ALKPHOS, ALT, AST in the last 72 hours.     No results found for: PROCAL    Lab Results   Component Value Date    .1 10/19/2020    CRP 97.3 2019    .4 2019     No results found for: FERRITIN  No results found for: FIBRINOGEN  No results found for: DDIMER  No results found for: LDH    Lab Results   Component Value Date    SEDRATE 86 (H) 10/19/2020       Lab Results   Component Value Date    COVID19 Not Detected 10/19/2020     No results found for requested labs within last 30 days. Imaging Studies:   THREE XRAY VIEWS OF THE LEFT FOOT 10/19/2020 6:33 pm  FINDINGS:   Abnormality of the base of the 1st proximal phalanx. This appearance could be caused by chronic infectious process or fracture. MRI may be helpful for better characterization     MRI OF THE LEFT FOOT WITHOUT CONTRAST, 10/20/2020 7:44 am   FINDINGS:  1. Plantar ulceration at the level of the 1st MTP joint with large focal underlying soft tissue abnormality measuring 4.9 x 2.1 x 4.3 cm. Phlegmonous change is felt most likely. Infectious adventitial bursitis is an alternate consideration. 2. Flexor hallucis longus tenosynovitis and partial thickness tearing. The tendon sheath appears to communicate with the plantar soft tissue collection raising concern for infectious tenosynovitis. Additionally there is tenosynovitis of the proximal flexor digitorum longus tendon sheaths which may also be infectious. 3. Joint effusion/synovitis at the 1st MTP joint with associated marrow signal abnormality. Septic arthritis and osteomyelitis should be considered. 4. Marrow signal abnormality of the 1st distal phalanx may reflect early osteomyelitis. Phlegmonous change appears to extend along the plantar aspect of the great toe. Lower Arterial Plethysmography, PVR Lower with PPG. Summary     Normal PVR at rest of the bilateral lower extremity. Normal arterial PPG waveforms of the bilateral digits.      Electronically signed by Darwin Aviles(Interpreting physician)  on 10/20/2020 09:01 PM     Cultures:     Culture, Anaerobic and Aerobic [[id number]]   Collected: 10/21/20 1016    Order Status: Sent  Specimen: Bone from Foot  Updated: 10/21/20 1048    Culture, Anaerobic and Aerobic [[id number]]   Collected: 10/21/20 1015    Order Status: Sent  Specimen: Bone from Foot  Updated: 10/21/20 1048    Culture, Anaerobic and Aerobic [[id number]] (Abnormal)  Collected: 10/19/20 2008    Order Status: Completed  Specimen: Foot  Updated: 10/21/20 0011     Specimen Description  . FOOT LEFT FOOT     Special Requests  NOT REPORTED     Direct Exam  FEW NEUTROPHILSAbnormal        FEW GRAM POSITIVE COCCI IN CLUSTERSAbnormal        RARE GRAM NEGATIVE RODSAbnormal       Culture  PRESUMPTIVE ID: GROUP D ENTEROCOCCUS LIGHT GROWTH      NORMAL CHHAYA            Thank you for allowing us to participate in the care of this patient. Please call with questions. Electronically signed by Naveen Painter MD on 10/21/2020 at 4:02 PM      [facility]  [address]  OFFICE: [phone]      This note is created with the assistance of a speech recognition program.  While intending to generate a document that actually reflects the content of the visit, the document can still have some errors including those of syntax and sound a like substitutions which may escape proof reading. In such instances, actual meaning can be extrapolated by contextual diversion.

## 2020-10-21 NOTE — PROGRESS NOTES
[facility]  Office: [address], DO, Max Morillo, DO, Fang Ames, DO, John Dai, DO, Barb Belle MD, Calli Esqueda MD, Ben Chavez MD, Lizet Romero MD, Naldo De Los Santos MD, Ledy Hernandez MD, Jennifer Ham MD, Claus Washington MD, Shania Qureshi MD, Nilda Hassan, DO, Simran Callaway MD, Florin Parra MD, Media Sensor, DO, Dulce Pineda MD,  Shalonda Rodrigues DO, Oliva Armstrong MD, Kaelyn Lancaster MD, Mau Clarke, CNP, [facility] Jim, CNP, Sofía Castrejon, CNP, Philly Leach, CNS, Marilyn Woo, CNP, Marilynn Dean, CNP, Fuentes Garnett, CNP, Merlinda Goring, CNP, Omid Tovar, CNP, Keshav Turner PA-C, Bonnie Camejo, [facility], Kelley Barrera, CNP, Nuria Nagel, CNP, Marisela Plummer, CNP, Rene Hutson, CNP, Sandra Ji, CNP         [facility]   [address]    Progress Note    10/21/2020    8:33 AM    Name:   Maximiliano Armstrong  MRN:     [id number]     [place]:      [de-identified]   Room:   2021/202101   Day:  2  Admit Date:  10/19/2020  5:42 PM    PCP:   Ja Cornelius MD  Code Status:  Full Code    Subjective:     C/C:   Chief Complaint   Patient presents with    Cellulitis     left foot     Interval History Status: not changed. Has some ankle pain but denies foot pain  No cp/sob/n/v    Tells me he walks 2 miles/day on job and has no lópez or cp  Also he says he climbs in/out of semi multiple times a day, equivalent to 200 steps, again without symptoms    Brief History:     Per my REGINA:  \"Shad Gomez is a 63 y.o. Non-/non  male who presents with Cellulitis (left foot)   and is admitted to the hospital for the management of Non-healing ulcer of foot, left, with unspecified severity ([address]    The patient reports to the hospital with complaint of worsening left foot wound. Patient reports that he had a chronic left foot wound was under the care of Dr. Emmanuel Swan with podiatry.   He also endorses a fracture to his left LORazepam, melatonin, sodium chloride flush, potassium chloride **OR** potassium alternative oral replacement **OR** potassium chloride, magnesium sulfate, acetaminophen **OR** acetaminophen, polyethylene glycol, promethazine **OR** ondansetron, nicotine, glucose, dextrose, glucagon (rDNA), dextrose, morphine    Data:     Past Medical History:   has a past medical history of Chronic kidney disease, Diabetes mellitus ([facility] History of kidney stones, and Sleep apnea. Social History:   reports that he has never smoked. He has never used smokeless tobacco. He reports current alcohol use. He reports that he does not use drugs. Family History:   Family History   Problem Relation Age of Onset    Heart Disease Mother     Heart Disease Father        Vitals:  /63   Pulse 79   Temp 98 °F (36.7 °C) (Oral)   Resp 17   Ht 6' 4\" (1.93 m)   Wt (!) 360 lb 9.6 oz (163.6 kg)   SpO2 94%   BMI 43.89 kg/m²   Temp (24hrs), Av.1 °F (36.7 °C), Min:98 °F (36.7 °C), Max:98.2 °F (36.8 °C)    Recent Labs     10/20/20  0556 10/20/20  1128 10/20/20  1629 10/20/20  2046   POCGLU 164* 260* 238* 312*       I/O (24Hr):     Intake/Output Summary (Last 24 hours) at 10/21/2020 0833  Last data filed at 10/21/2020 0542  Gross per 24 hour   Intake 350 ml   Output --   Net 350 ml       Labs:  Hematology:  Recent Labs     10/19/20  1930 10/20/20  0549 10/21/20  0453   WBC 11.1 10.0 9.0   RBC 4.29 4.36 4.18*   HGB 13.0 13.0 12.5*   HCT 40.3* 41.0 38.6*   MCV 93.9 94.0 92.3   MCH 30.3 29.8 29.9   MCHC 32.3 31.7 32.4   RDW 12.0 11.9 11.9    281 293   MPV 8.9 10.5 8.8   SEDRATE 86*  --   --    .1*  --   --    INR  --  1.1  --      Chemistry:  Recent Labs     10/19/20  1930 10/20/20  0549 10/21/20  0453   * 137 136   K 4.4 4.4 4.9   CL 98 103 102   CO2 23 24 27   GLUCOSE 280* 186* 212*   BUN 30* 23 21   CREATININE 1.51* 1.38* 1.44*   ANIONGAP 12 10 7*   LABGLOM 47* 52* 50*   GFRAA 57* >60 >60 as from jan 2016 ekg     Physical Examination:        General appearance:  alert, cooperative and no distress  Mental Status:  oriented to person, place and time and normal affect  Lungs:  clear to auscultation bilaterally, normal effort  Heart:  regular rate and rhythm, no murmur  Abdomen:  soft, nontender, nondistended, normal bowel sounds, no masses, hepatomegaly, splenomegaly; obese  Extremities:  no edema, redness, tenderness in the calves  Skin:  See pics right foot    Assessment:        Hospital Problems           Last Modified POA    * (Principal) Non-healing ulcer of foot, left, with unspecified severity ([address] 10/19/2020 Yes    Type 2 diabetes mellitus with hyperglycemia, with long-term current use of insulin ([facility] 10/19/2020 Yes    Diabetic autonomic neuropathy associated with type 2 diabetes mellitus ([facility] 10/19/2020 Yes    Obstructive sleep apnea 10/19/2020 Yes    Class 3 severe obesity due to excess calories with serious comorbidity and body mass index (BMI) of 45.0 to 49.9 in adult [facility]) 10/19/2020 Yes    CKD (chronic kidney disease) stage 3, GFR 30-59 ml/min 10/20/2020 Yes          Plan: For surgery this am: I have discussed with the patient that every surgery carries some risk, inherent in surgery itself-in his case biggest risk is his obesity/naye. The patient understands that we do all we can to minimize this risk but sometimes complications can still arise, including bleeding, infection, anesthesia complications, even death. The patient understands and is willing to proceed. The surgery is low risk, the patient is moderate risk. Proceed as scheduled.   Cont iv antibiotics for now; not sure he needs to remain on zyvox, started by ER  ID consult      Mariangel Dai DO  10/21/2020  8:33 AM

## 2020-10-21 NOTE — ANESTHESIA POSTPROCEDURE EVALUATION
Department of Anesthesiology  Postprocedure Note    Patient: Mariam Still  MRN: [id number]  [place]: 1960  Date of evaluation: 10/21/2020  Time:  11:50 AM     Procedure Summary     Date:  10/21/20 Room / Location:  [place] [address]    Anesthesia Start:  4872 Anesthesia Stop:  0914    Procedure:  LEFT PARTIAL FIRST RAY  AMPUTATION (Left ) Diagnosis:  (DX OSTEOMYELITITS LEFT FOOT)    Surgeon:  Nkechi Brice DPM Responsible Provider:  Samira Foley MD    Anesthesia Type:  MAC ASA Status:  3          Anesthesia Type: MAC    Margarita Phase I: Margarita Score: 10    Margarita Phase II:      Last vitals: Reviewed and per EMR flowsheets.        Anesthesia Post Evaluation    Patient location during evaluation: PACU  Complications: no

## 2020-10-21 NOTE — PROGRESS NOTES
Pt having to have BM, contacted podiatry to check on weight bearing status and orders received to be NWB to the left foot, obtined walker for pt and instructed him on NWB to the left foot, pt poorly compliant, kept putting weight on the left heel, insisting \"he had to have a BM and did not have time to do this properly\", pt instructed to use call light in bathroom to have help back to bed that he did not do also, shorly after back to the bed noted to have  Small amt light red drainage coming through the dressing, reinforced again that pt should be NWB, left foot elevated on 2 pillows, will observe

## 2020-10-21 NOTE — PROGRESS NOTES
Pt returned from OR , awake and alert, resp unlabored, left foot dressing dry and intact, pt made comfortable, waiting for lunch tray

## 2020-10-21 NOTE — PROGRESS NOTES
Progress Note  Podiatric Medicine and Surgery     Subjective     CC: Left foot wound    Patient seen and examined at bedside. No acute events overnight. Afebrile, VS stable  Admits to some pain in the medial left ankle, improving  Scheduled for Surgery this AM (consent in chart)  NPO since last night    HPI :  Jun Basilio is a 63 y.o. male seen at [facility] for left foot wound. Patient notes that he has had a left foot wound for the last year that has been treated by podiatrist Dr. Heraclio Paul. Has a surgical shoe for the left foot and has been performing daily dressing changes and has been soaking his foot in saline. Denies any new falls or trauma. Relates that he has had a history of a fracture in his left great toe for quite some time and that is not new. Notes that he has very little sensation to feet however his great toe has a constant sharp localized pain to his great toe joint. Has been on 1 week course of Cipro and notes that since Monday left foot has become more red and swollen and painful with increasing drainage. Patient was seen by Dr. Severo Mower today who recommended reporting to ED for further evaluation. ROS: Denies N/V/F/C/SOB/CP. Otherwise negative except at stated in the HPI.      Medications:  Scheduled Meds:   insulin lispro  0-12 Units Subcutaneous TID WC    insulin lispro  0-6 Units Subcutaneous Nightly    allopurinol  300 mg Oral Daily    gabapentin  300 mg Oral BID    insulin glargine  30 Units Subcutaneous BID    magnesium oxide  400 mg Oral Daily    therapeutic multivitamin-minerals  1 tablet Oral Daily    sodium chloride flush  10 mL Intravenous 2 times per day    cefepime  2 g Intravenous Q12H    linezolid  600 mg Intravenous Q12H    heparin (porcine)  5,000 Units Subcutaneous 3 times per day       Continuous Infusions:   dextrose         PRN Meds:fentanNYL, fentanNYL, HYDROmorphone, HYDROmorphone, ondansetron, LORazepam, melatonin, sodium chloride flush, potassium chloride **OR** potassium alternative oral replacement **OR** potassium chloride, magnesium sulfate, acetaminophen **OR** acetaminophen, polyethylene glycol, promethazine **OR** ondansetron, nicotine, glucose, dextrose, glucagon (rDNA), dextrose, morphine    Objective     Vitals:  Patient Vitals for the past 8 hrs:   BP Temp Temp src Pulse Resp SpO2   10/21/20 0724 110/63 98 °F (36.7 °C) Oral 79 17 94 %     Average, Min, and Max for last 24 hours Vitals:  TEMPERATURE:  Temp  Av.1 °F (36.7 °C)  Min: 98 °F (36.7 °C)  Max: 98.2 °F (36.8 °C)    RESPIRATIONS RANGE: Resp  Av  Min: 17  Max: 17    PULSE RANGE: Pulse  Av  Min: 73  Max: 79    BLOOD PRESSURE RANGE:  Systolic (24CLI), UXZ:458 , Min:110 , GEC:467   ; Diastolic (06HRM), RDJ:11, Min:56, Max:63      PULSE OXIMETRY RANGE: SpO2  Av %  Min: 94 %  Max: 94 %    I/O last 3 completed shifts: In: 350 [IV Piggyback:350]  Out: -     CBC:  Recent Labs     10/19/20  1930 10/20/20  0549 10/21/20  0453   WBC 11.1 10.0 9.0   HGB 13.0 13.0 12.5*   HCT 40.3* 41.0 38.6*    281 293   .1*  --   --         BMP:  Recent Labs     10/19/20  1930 10/20/20  0549 10/21/20  0453   * 137 136   K 4.4 4.4 4.9   CL 98 103 102   CO2 23 24 27   BUN 30* 23 21   CREATININE 1.51* 1.38* 1.44*   GLUCOSE 280* 186* 212*   CALCIUM 9.4 9.6 9.5        Coags:  Recent Labs     10/20/20  0549   INR 1.1       Lab Results   Component Value Date    SEDRATE 86 (H) 10/19/2020     Recent Labs     10/19/20  1930   .1*       Lower Extremity Physical Exam:   Vascular: DP and PT pulses are palpable. CFT <3 seconds to all digits. Hair growth is absent to the level of the digits. Non pitting edema left foot.       Neuro: Saph/sural/SP/DP/plantar sensation absent to light touch.     Musculoskeletal: Muscle strength is 5/5 to all lower extremity muscle groups. Gross deformity is absent. POP noted to first MPJ, subfirst met head left foot.   Negative pain on calf squeeze bilaterally. Able to dorsiflex and plantarflex hallux to full active range of motion left foot. All muscle compartments soft compressible.     Dermatologic: ulcer #1 located subfirst metatarsal head left foot and measures approximately 0.3 cm x 0.3 cm x 2.5 cm. Base is fibrotic. Periwound skin is macerated. Seropurulent drainage noted with no associated mal odor. Erythema present surrounding plantar first metatarsal head traveling dorsally to level of first MPJ with  associated increase in warmth. Does probe to bone, no sinus track, or undermine. induration noted without fluctuance or crepitus.        Ulcer #2 located sub first metatarsal head left foot distal to ulcer #1 and measures approximately 0.3 cm x 0.3 cm x 2.0 cm. Base is fibrogranular. Periwound skin is macerated. Scant serous drainage noted with no associated mal odor. Erythema present surrounding plantar first metatarsal head traveling dorsally to level of first MPJ with  associated increase in warmth. Does not probe to bone, sinus track, or undermine. No fluctuance, crepitus, or induration.        Ulcer #3 located sub medial proximal phalanx left foot measures approximately 0.3 cm x 0.3 cm x 1.5 cm. Base is fibrotic. Periwound skin is macerated. Purulent drainage noted with no associated mal odor. Erythema present surrounding plantar first metatarsal head traveling dorsally to level of first MPJ with  associated increase in warmth. Does not probe to bone, sinus track, or undermine. induration noted without fluctuance or crepitus. Clinical Images: (10/20/20)                  Imaging:   VL LOWER EXTREMITY ARTERIAL SEGMENTAL PRESSURES W PPG   Final Result      MRI FOOT LEFT WO CONTRAST   Preliminary Result   1. Plantar ulceration at the level of the 1st MTP joint with large focal   underlying soft tissue abnormality measuring 4.9 x 2.1 x 4.3 cm. Phlegmonous   change is felt most likely.   Infectious adventitial bursitis is an alternate consideration. 2. Flexor hallucis longus tenosynovitis and partial thickness tearing. The   tendon sheath appears to communicate with the plantar soft tissue collection   raising concern for infectious tenosynovitis. Additionally there is   tenosynovitis of the proximal flexor digitorum longus tendon sheaths which   may also be infectious. 3. Joint effusion/synovitis at the 1st MTP joint with associated marrow   signal abnormality. Septic arthritis and osteomyelitis should be considered. 4. Marrow signal abnormality of the 1st distal phalanx may reflect early   osteomyelitis. Phlegmonous change appears to extend along the plantar aspect   of the great toe. XR FOOT LEFT (MIN 3 VIEWS)   Final Result   Abnormality of the base of the 1st proximal phalanx. This appearance could   be caused by chronic infectious process or fracture. MRI may be helpful for   better characterization             Cultures:   Left wound cx swab (10/19/20)- Pending    Assessment   Mariam Still is a 63 y.o. male with   1. Abscess left foot  2. TARA  3. Cellulitis left foot  4. Diabetic foot ulcer down to bone left foot  5. DM2 with peripheral neuropathy  6.  Fracture vs OM of the 1st proximal phalanx    Principal Problem:    Non-healing ulcer of foot, left, with unspecified severity ([address]  Active Problems:    Type 2 diabetes mellitus with hyperglycemia, with long-term current use of insulin ([facility])    Diabetic autonomic neuropathy associated with type 2 diabetes mellitus ([address]    Obstructive sleep apnea    Class 3 severe obesity due to excess calories with serious comorbidity and body mass index (BMI) of 45.0 to 49.9 in adult [facility])    CKD (chronic kidney disease) stage 3, GFR 30-59 ml/min  Resolved Problems:    Diabetic ulcer of left foot associated with type 2 diabetes mellitus ([address]    Cellulitis of left lower extremity       Plan     · Patient examined and evaluated at bedside   · Treatment options discussed in detail with the patient  · High suspicion for OM vs abscess given the clinical picture and possible pathologic fracture of the 1st proximal phalanx. · MRI reviewed- Gouty tophi, vs phlegmon  · Discussed treatment options with patient, patient agrees to proceed with surgery. Plan to proceed to the OR for I&D with debridement of all nonviable soft tissue and bone vs partial 1st ray amp. All risks and benefits were discussed with patient, no guarantees were given or implied. ? NPO since midnight, no AC  ? Consent signed and placed in the chart, LLE marked  ? COVID negative  · PVRs reviewed and results discussed with vascular surgery. · Abx- Cefepime/Zyvox. Recommend ID consult  · Dressing applied to Left foot: DSD, Kerlix, Ace. · WBAT in surgical shoe to Left lower extremity  · Discussed with Dr. Dori Camejo. Reymundo Mitchell DPM   Podiatric Medicine & Surgery   10/21/2020 at 8:08 AM    Senior Resident Statement:  I have discussed the case, including pertinent history and exam findings with the resident. I agree with the assessment, plan and orders as documented by the intern.       Electronically signed by Dana Hardy DPM on 10/21/2020 at 8:55 AM

## 2020-10-21 NOTE — BRIEF OP NOTE
PODIATRY BRIEF OP NOTE    PATIENT NAME: Mariam Still  YOB: 1960  -  63 y.o. male  MRN: [id number]  DATE: 10/21/2020  BILLING #: [id number]    Surgeon(s):  Nkechi Brice DPM     ASSISTANTS: Camilla Perkins DPM PGY-1 Annmarie Baires PGY-3    PRE-OP DIAGNOSIS:   1. Osteomyelitis of Proximal Phalanx and sesamoids of the left foot. POST-OP DIAGNOSIS: Same as above. PROCEDURE:   1. Amputation hallux  2. Removal of tibial sesamoid  3. Removal of fibular sesamoid  4. Bone biopsy of 1st metatarsal    ANESTHESIA: Mac with 10cc of 1% lidocain plane    HEMOSTASIS: Pneumatic ankle tourniquet @ 250 mmHg for 70 minutes. ESTIMATED BLOOD LOSS: Less than 50cc. MATERIALS: none  * No implants in log *    INJECTABLES: 10cc of 0.5% marcaine plain post op    SPECIMEN:   ID Type Source Tests Collected by Time Destination   1 : LEFT FIRST BONE Bone Foot CULTURE, ANAEROBIC AND AEROBIC [facility], [facility] 10/21/2020 1015    A : LEFT HALLUX  Tissue Foot SURGICAL PATHOLOGY [facility], [facility] 10/21/2020 0948    B : LEFT FOOT SOFT TISSUE Tissue Foot SURGICAL PATHOLOGY [facility], [facility] 10/21/2020 0955    C : LEFT FIRST BONE Bone Foot SURGICAL PATHOLOGY [facility], [facility] 10/21/2020 1013    D : LEFT FOOT SESMOID Bone Foot SURGICAL PATHOLOGY, CULTURE, ANAEROBIC AND AEROBIC [facility], [facility] 10/21/2020 1516        COMPLICATIONS: none    FINDINGS: Infected, non viable tissue to the proximal phalanx of the hallux and the sesamoids of the left foot. The patient was counseled at length about the risks of jossue Covid-19 during their perioperative period and any recovery window from their procedure. The patient was made aware that jossue Covid-19  may worsen their prognosis for recovering from their procedure  and lend to a higher morbidity and/or mortality risk. All material risks, benefits, and reasonable alternatives including postponing the procedure were discussed.  The patient does wish to proceed with the procedure at this time.     Anibal Moore DPM   Podiatric Medicine & Surgery   10/21/2020 at 11:00 AM

## 2020-10-22 PROBLEM — A41.9 SEPSIS (HCC): Status: ACTIVE | Noted: 2020-10-22

## 2020-10-22 LAB
C-REACTIVE PROTEIN: 103.9 MG/L (ref 0–5)
GLUCOSE BLD-MCNC: 221 MG/DL (ref 75–110)
GLUCOSE BLD-MCNC: 264 MG/DL (ref 75–110)
GLUCOSE BLD-MCNC: 312 MG/DL (ref 75–110)
GLUCOSE BLD-MCNC: 319 MG/DL (ref 75–110)
SURGICAL PATHOLOGY REPORT: NORMAL

## 2020-10-22 PROCEDURE — 2580000003 HC RX 258: Performed by: STUDENT IN AN ORGANIZED HEALTH CARE EDUCATION/TRAINING PROGRAM

## 2020-10-22 PROCEDURE — 82947 ASSAY GLUCOSE BLOOD QUANT: CPT

## 2020-10-22 PROCEDURE — 99232 SBSQ HOSP IP/OBS MODERATE 35: CPT | Performed by: INTERNAL MEDICINE

## 2020-10-22 PROCEDURE — 6370000000 HC RX 637 (ALT 250 FOR IP): Performed by: INTERNAL MEDICINE

## 2020-10-22 PROCEDURE — 6370000000 HC RX 637 (ALT 250 FOR IP): Performed by: STUDENT IN AN ORGANIZED HEALTH CARE EDUCATION/TRAINING PROGRAM

## 2020-10-22 PROCEDURE — 6360000002 HC RX W HCPCS: Performed by: STUDENT IN AN ORGANIZED HEALTH CARE EDUCATION/TRAINING PROGRAM

## 2020-10-22 PROCEDURE — 1200000000 HC SEMI PRIVATE

## 2020-10-22 RX ORDER — OXYCODONE AND ACETAMINOPHEN 10; 325 MG/1; MG/1
.5-1 TABLET ORAL NIGHTLY PRN
Status: ON HOLD | COMMUNITY
End: 2020-10-23 | Stop reason: HOSPADM

## 2020-10-22 RX ORDER — DICLOFENAC SODIUM 75 MG/1
75 TABLET, DELAYED RELEASE ORAL 2 TIMES DAILY
Status: ON HOLD | COMMUNITY
End: 2020-12-04 | Stop reason: HOSPADM

## 2020-10-22 RX ORDER — INSULIN GLARGINE 100 [IU]/ML
40 INJECTION, SOLUTION SUBCUTANEOUS 2 TIMES DAILY
Status: DISCONTINUED | OUTPATIENT
Start: 2020-10-22 | End: 2020-10-27

## 2020-10-22 RX ADMIN — CEFEPIME HYDROCHLORIDE 2 G: 2 INJECTION, POWDER, FOR SOLUTION INTRAVENOUS at 08:06

## 2020-10-22 RX ADMIN — INSULIN LISPRO 2 UNITS: 100 INJECTION, SOLUTION INTRAVENOUS; SUBCUTANEOUS at 08:00

## 2020-10-22 RX ADMIN — LINEZOLID 600 MG: 600 INJECTION, SOLUTION INTRAVENOUS at 21:54

## 2020-10-22 RX ADMIN — GABAPENTIN 300 MG: 300 CAPSULE ORAL at 08:00

## 2020-10-22 RX ADMIN — INSULIN LISPRO 4 UNITS: 100 INJECTION, SOLUTION INTRAVENOUS; SUBCUTANEOUS at 21:53

## 2020-10-22 RX ADMIN — OXYCODONE HYDROCHLORIDE 10 MG: 5 TABLET ORAL at 06:18

## 2020-10-22 RX ADMIN — HEPARIN SODIUM 5000 UNITS: 5000 INJECTION INTRAVENOUS; SUBCUTANEOUS at 15:04

## 2020-10-22 RX ADMIN — SODIUM CHLORIDE, PRESERVATIVE FREE 10 ML: 5 INJECTION INTRAVENOUS at 19:48

## 2020-10-22 RX ADMIN — OXYCODONE HYDROCHLORIDE 10 MG: 5 TABLET ORAL at 15:47

## 2020-10-22 RX ADMIN — OXYCODONE HYDROCHLORIDE 10 MG: 5 TABLET ORAL at 19:48

## 2020-10-22 RX ADMIN — MULTIPLE VITAMINS W/ MINERALS TAB 1 TABLET: TAB at 08:00

## 2020-10-22 RX ADMIN — HEPARIN SODIUM 5000 UNITS: 5000 INJECTION INTRAVENOUS; SUBCUTANEOUS at 21:53

## 2020-10-22 RX ADMIN — INSULIN GLARGINE 40 UNITS: 100 INJECTION, SOLUTION SUBCUTANEOUS at 21:53

## 2020-10-22 RX ADMIN — MAGNESIUM GLUCONATE 500 MG ORAL TABLET 400 MG: 500 TABLET ORAL at 08:00

## 2020-10-22 RX ADMIN — LINEZOLID 600 MG: 600 INJECTION, SOLUTION INTRAVENOUS at 09:22

## 2020-10-22 RX ADMIN — OXYCODONE HYDROCHLORIDE 10 MG: 5 TABLET ORAL at 01:34

## 2020-10-22 RX ADMIN — INSULIN LISPRO 6 UNITS: 100 INJECTION, SOLUTION INTRAVENOUS; SUBCUTANEOUS at 17:02

## 2020-10-22 RX ADMIN — CEFEPIME HYDROCHLORIDE 2 G: 2 INJECTION, POWDER, FOR SOLUTION INTRAVENOUS at 19:48

## 2020-10-22 RX ADMIN — OXYCODONE HYDROCHLORIDE 10 MG: 5 TABLET ORAL at 11:49

## 2020-10-22 RX ADMIN — GABAPENTIN 300 MG: 300 CAPSULE ORAL at 21:53

## 2020-10-22 RX ADMIN — ALLOPURINOL 300 MG: 300 TABLET ORAL at 08:00

## 2020-10-22 RX ADMIN — HEPARIN SODIUM 5000 UNITS: 5000 INJECTION INTRAVENOUS; SUBCUTANEOUS at 06:18

## 2020-10-22 RX ADMIN — ACETAMINOPHEN 650 MG: 325 TABLET ORAL at 20:42

## 2020-10-22 RX ADMIN — INSULIN GLARGINE 30 UNITS: 100 INJECTION, SOLUTION SUBCUTANEOUS at 08:00

## 2020-10-22 RX ADMIN — INSULIN LISPRO 8 UNITS: 100 INJECTION, SOLUTION INTRAVENOUS; SUBCUTANEOUS at 11:49

## 2020-10-22 ASSESSMENT — PAIN DESCRIPTION - ORIENTATION
ORIENTATION: LEFT

## 2020-10-22 ASSESSMENT — PAIN DESCRIPTION - PAIN TYPE
TYPE: ACUTE PAIN
TYPE: ACUTE PAIN;SURGICAL PAIN
TYPE: ACUTE PAIN;SURGICAL PAIN
TYPE: ACUTE PAIN

## 2020-10-22 ASSESSMENT — PAIN DESCRIPTION - DESCRIPTORS
DESCRIPTORS: BURNING;PRESSURE
DESCRIPTORS: ACHING
DESCRIPTORS: BURNING;PRESSURE
DESCRIPTORS: HEADACHE

## 2020-10-22 ASSESSMENT — PAIN DESCRIPTION - LOCATION
LOCATION: ANKLE;FOOT
LOCATION: HEAD
LOCATION: HEAD
LOCATION: FOOT;ANKLE
LOCATION: FOOT
LOCATION: FOOT

## 2020-10-22 ASSESSMENT — PAIN SCALES - GENERAL
PAINLEVEL_OUTOF10: 7
PAINLEVEL_OUTOF10: 7
PAINLEVEL_OUTOF10: 1
PAINLEVEL_OUTOF10: 7
PAINLEVEL_OUTOF10: 7
PAINLEVEL_OUTOF10: 3
PAINLEVEL_OUTOF10: 7
PAINLEVEL_OUTOF10: 8

## 2020-10-22 ASSESSMENT — PAIN DESCRIPTION - ONSET
ONSET: ON-GOING
ONSET: ON-GOING
ONSET: GRADUAL
ONSET: ON-GOING

## 2020-10-22 ASSESSMENT — ENCOUNTER SYMPTOMS
GASTROINTESTINAL NEGATIVE: 1
ALLERGIC/IMMUNOLOGIC NEGATIVE: 1
RESPIRATORY NEGATIVE: 1

## 2020-10-22 ASSESSMENT — PAIN - FUNCTIONAL ASSESSMENT
PAIN_FUNCTIONAL_ASSESSMENT: PREVENTS OR INTERFERES SOME ACTIVE ACTIVITIES AND ADLS
PAIN_FUNCTIONAL_ASSESSMENT: PREVENTS OR INTERFERES SOME ACTIVE ACTIVITIES AND ADLS
PAIN_FUNCTIONAL_ASSESSMENT: ACTIVITIES ARE NOT PREVENTED
PAIN_FUNCTIONAL_ASSESSMENT: PREVENTS OR INTERFERES SOME ACTIVE ACTIVITIES AND ADLS

## 2020-10-22 ASSESSMENT — PAIN DESCRIPTION - FREQUENCY
FREQUENCY: INTERMITTENT

## 2020-10-22 ASSESSMENT — PAIN DESCRIPTION - PROGRESSION
CLINICAL_PROGRESSION: NOT CHANGED

## 2020-10-22 NOTE — PROGRESS NOTES
Progress Note  Podiatric Medicine and Surgery     Subjective     CC: Left foot wound    Patient seen and examined at bedside. No acute events overnight. Afebrile, VS stable  Status post hallux amputation of left foot with sesamoidectomy(DOS 10/21/2020)  Pain is well controlled overnight  Patient currently wondering if he will be going to facility, or going home. Has not worked with PT post-operatively yet    HPI :  Dennie Clinton is a 63 y.o. male [facility] [place] [facility] for left foot wound. Patient notes that he has had a left foot wound for the last year that has been treated by podiatrist Dr. Nette Damon. Has a surgical shoe for the left foot and has been performing daily dressing changes and has been soaking his foot in saline. Denies any new falls or trauma. Relates that he has had a history of a fracture in his left great toe for quite some time and that is not new. Notes that he has very little sensation to feet however his great toe has a constant sharp localized pain to his great toe joint. Has been on 1 week course of Cipro and notes that since Monday left foot has become more red and swollen and painful with increasing drainage. Patient was seen by Dr. Leny Valentin today who recommended reporting to ED for further evaluation. ROS: Denies N/V/F/C/SOB/CP. Otherwise negative except at stated in the HPI.      Medications:  Scheduled Meds:   insulin lispro  0-12 Units Subcutaneous TID WC    insulin lispro  0-6 Units Subcutaneous Nightly    allopurinol  300 mg Oral Daily    gabapentin  300 mg Oral BID    insulin glargine  30 Units Subcutaneous BID    magnesium oxide  400 mg Oral Daily    therapeutic multivitamin-minerals  1 tablet Oral Daily    sodium chloride flush  10 mL Intravenous 2 times per day    cefepime  2 g Intravenous Q12H    linezolid  600 mg Intravenous Q12H    heparin (porcine)  5,000 Units Subcutaneous 3 times per day       Continuous Infusions:   dextrose         PRN Meds:oxyCODONE **OR** oxyCODONE, LORazepam, melatonin, sodium chloride flush, potassium chloride **OR** potassium alternative oral replacement **OR** potassium chloride, magnesium sulfate, acetaminophen **OR** acetaminophen, polyethylene glycol, promethazine **OR** ondansetron, nicotine, glucose, dextrose, glucagon (rDNA), dextrose, morphine    Objective     Vitals:  Patient Vitals for the past 8 hrs:   BP Temp Temp src Pulse Resp SpO2   10/22/20 1153 (!) 126/53 98.4 °F (36.9 °C) Oral 73 18 93 %   10/22/20 0810 133/68 98.4 °F (36.9 °C) Oral 83 16 93 %     Average, Min, and Max for last 24 hours Vitals:  TEMPERATURE:  Temp  Av.8 °F (37.1 °C)  Min: 98.4 °F (36.9 °C)  Max: 99.9 °F (37.7 °C)    RESPIRATIONS RANGE: Resp  Av  Min: 16  Max: 19    PULSE RANGE: Pulse  Av.3  Min: 64  Max: 83    BLOOD PRESSURE RANGE:  Systolic (84BEZ), ZLW:449 , Min:124 , UVF:520   ; Diastolic (10FLA), VRV:03, Min:49, Max:68      PULSE OXIMETRY RANGE: SpO2  Av.8 %  Min: 93 %  Max: 95 %    I/O last 3 completed shifts: In: 267 [P.O.:300; I.V.:553]  Out: 945 [Urine:925; Blood:20]    CBC:  Recent Labs     10/19/20  1930 10/20/20  0549 10/21/20  0453   WBC 11.1 10.0 9.0   HGB 13.0 13.0 12.5*   HCT 40.3* 41.0 38.6*    281 293   .1*  --   --         BMP:  Recent Labs     10/19/20  1930 10/20/20  0549 10/21/20  0453   * 137 136   K 4.4 4.4 4.9   CL 98 103 102   CO2 23 24 27   BUN 30* 23 21   CREATININE 1.51* 1.38* 1.44*   GLUCOSE 280* 186* 212*   CALCIUM 9.4 9.6 9.5        Coags:  Recent Labs     10/20/20  0549   INR 1.1       Lab Results   Component Value Date    SEDRATE 86 (H) 10/19/2020     Recent Labs     10/19/20  1930   .1*       Lower Extremity Physical Exam:   Vascular: DP and PT pulses are palpable. CFT <3 seconds to all digits. Hair growth is absent to the level of the digits. Slight foot and ankle edema with .   Blood was noted to be at the dressings, but no active bleeding appreciated during exam.     Neuro: Saph/sural/SP/DP/plantar sensation absent to light touch.     Musculoskeletal: Muscle strength is 5/5 to all lower extremity muscle groups. Amputation of left hallux noted. Mild tenderness noted to amputation site as well as the medial ankle. Negative pain on calf squeeze bilaterally. Able to dorsiflex and plantarflex hallux to full active range of motion left foot. All muscle compartments soft compressible.     Dermatologic: Incision of the left hallux amputation extending from the dorsal foot to the plantar foot. Sutures intact with skin edges well coapted. with distal opening. Wound probes deep to bone approximately 3.0cm. Scant sanguinous drainage appreciated with no malodor. Periincisional edema and erythema with an increased warmth noted consistent with postop state. Clinical Images:                 Imaging:   VL LOWER EXTREMITY ARTERIAL SEGMENTAL PRESSURES W PPG   Final Result      MRI FOOT LEFT WO CONTRAST   Final Result   1. Plantar ulceration at the level of the 1st MTP joint with large focal   underlying soft tissue abnormality measuring 4.9 x 2.1 x 4.3 cm. Phlegmonous   change is felt most likely. Infectious adventitial bursitis is an alternate   consideration. 2. Flexor hallucis longus tenosynovitis and partial thickness tearing. The   tendon sheath appears to communicate with the plantar soft tissue collection   raising concern for infectious tenosynovitis. Additionally there is   tenosynovitis of the proximal flexor digitorum longus tendon sheaths which   may also be infectious. 3. Joint effusion/synovitis at the 1st MTP joint with associated marrow   signal abnormality. Septic arthritis and osteomyelitis should be considered. 4. Marrow signal abnormality of the 1st distal phalanx may reflect early   osteomyelitis. Phlegmonous change appears to extend along the plantar aspect   of the great toe.          XR FOOT LEFT (MIN 3 VIEWS)   Final Result   Abnormality of the base of the 1st proximal phalanx. This appearance could   be caused by chronic infectious process or fracture. MRI may be helpful for   better characterization             Cultures:   Left foot swab (10/19)- GPC, rare GNR, E faecalis  Left foot bone (10/21)- NGTD  Left sesamoid (10/21)- Few GPC    Assessment   Ramsey Alexander is a 63 y.o. male with status post amputation and sesamoidectomy  1. S/p left hallux amputation with tibial and fibular sesamoidectomy (DOS: 10/21/20)  2. Abscess left foot- Resolved  3. TARA  4. Cellulitis left foot- Improving  5. Diabetic foot ulcer down to bone left foot  6. DM2 with peripheral neuropathy    Principal Problem:    Non-healing ulcer of foot, left, with unspecified severity ([address]  Active Problems:    Type 2 diabetes mellitus with hyperglycemia, with long-term current use of insulin ([facility])    Diabetic autonomic neuropathy associated with type 2 diabetes mellitus ([address]    Obstructive sleep apnea    Class 3 severe obesity due to excess calories with serious comorbidity and body mass index (BMI) of 45.0 to 49.9 in adult [facility])    CKD (chronic kidney disease) stage 3, GFR 30-59 ml/min  Resolved Problems:    Diabetic ulcer of left foot associated with type 2 diabetes mellitus ([address]    Cellulitis of left lower extremity       Plan     · Patient examined and evaluated at bedside   · Treatment options discussed in detail with the patient. · ID on board: Currently on Zyvox/cefepime awaiting culture of tissue specimens and bone for final antibiotic recommendations  · Discussed with patient to be strict nonweight bearing in surgical shoe to the left extremity. PT/OT to evaluate NWB status and recommendations  · Left foot wound packed with 1/4 inch iodoform, dressed with Adaptic, 4 x 4, ABD, Kerlix, Ace. · Will follow path and cultures as well as clinical progression. No further acute surgical intervention planned by podiatry at this time. · Discussed with Dr. Ronnell Abreu [address]   Podiatric Medicine & Surgery   10/22/2020 at 11:57 AM     Senior Resident Statement:  I have discussed the case, including pertinent history and exam findings with the resident. I agree with the assessment, plan and orders as documented by the intern.       Electronically signed by Any Jacques DPM on 10/22/2020 at 12:39 PM

## 2020-10-22 NOTE — PLAN OF CARE
Problem: Falls - Risk of:  Goal: Will remain free from falls  Description: Will remain free from falls  10/22/2020 0943 by Eran Joyner RN  Outcome: Ongoing  10/22/2020 0351 by Janet Potter RN  Outcome: Ongoing  Note: Siderails up x 2  Hourly rounding  Call light in reach  Instructed to call for assist before attempting out of bed. Remains free from falls and accidental injury at this time   Floor free from obstacles  Bed is locked and in lowest position  Adequate lighting provided  [place] star and Stay with Me signs posted  Will continue to monitor. Goal: Absence of physical injury  Description: Absence of physical injury  Outcome: Ongoing     Problem: Infection:  Goal: Will remain free from infection  Description: Will remain free from infection  10/22/2020 0943 by Eran Joyner RN  Outcome: Ongoing  10/22/2020 0351 by Janet Potter RN  Outcome: Ongoing     Problem: Safety:  Goal: Free from accidental physical injury  Description: Free from accidental physical injury  10/22/2020 0943 by Eran Joyner RN  Outcome: Ongoing  10/22/2020 0351 by Janet Potter RN  Outcome: Ongoing  Goal: Free from intentional harm  Description: Free from intentional harm  Outcome: Ongoing     Problem: Daily Care:  Goal: Daily care needs are met  Description: Daily care needs are met  Outcome: Ongoing     Problem: Pain:  Goal: Patient's pain/discomfort is manageable  Description: Patient's pain/discomfort is manageable  Outcome: Ongoing  Goal: Pain level will decrease  Description: Pain level will decrease  Outcome: Ongoing  Goal: Control of acute pain  Description: Control of acute pain  10/22/2020 0943 by Eran Joyner RN  Outcome: Ongoing  10/22/2020 0351 by Janet Potter RN  Outcome: Ongoing  Note: Pain level assessment complete.    Patient educated on pain scale and control interventions  Roxicodone Q4hrs PRN pain medication given per patient request  Patient instructed to call out with new onset of pain or unrelieved pain  Will continue to monitor.     Goal: Control of chronic pain  Description: Control of chronic pain  Outcome: Ongoing     Problem: Skin Integrity:  Goal: Skin integrity will stabilize  Description: Skin integrity will stabilize  Outcome: Ongoing     Problem: Discharge Planning:  Goal: Patients continuum of care needs are met  Description: Patients continuum of care needs are met  Outcome: Ongoing     Problem: Nutrition  Goal: Optimal nutrition therapy  Outcome: Ongoing     Problem: Discharge Planning:  Goal: Discharged to appropriate level of care  Description: Discharged to appropriate level of care  Outcome: Ongoing     Problem: Serum Glucose Level - Abnormal:  Goal: Ability to maintain appropriate glucose levels will improve  Description: Ability to maintain appropriate glucose levels will improve  10/22/2020 0943 by Yolanda Neville RN  Outcome: Ongoing  10/22/2020 0351 by Sada Sauceda RN  Outcome: Ongoing     Problem: Sensory Perception - Impaired:  Goal: Ability to maintain a stable neurologic state will improve  Description: Ability to maintain a stable neurologic state will improve  10/22/2020 0943 by Yolanda Neville RN  Outcome: Ongoing  10/22/2020 0351 by Sada Sauceda RN  Outcome: Ongoing

## 2020-10-22 NOTE — OP NOTE
PODIATRY OP NOTE     PATIENT NAME: Drew Lopez DATE: 1960  -  63 y.o. male  MRN: [id number]  DATE: 10/21/2020  BILLING #: [id number]     Surgeon(s):  Stepan Buckley DPM      ASSISTANTS: Shayy Dai DPM PGY-1 Ernesto Boyer PGY-3     PRE-OP DIAGNOSIS:   1. Osteomyelitis of Proximal Phalanx and sesamoids of the left foot.     POST-OP DIAGNOSIS: Same as above.     PROCEDURE:   1. Amputation hallux  2. Removal of tibial sesamoid  3. Removal of fibular sesamoid  4.  Bone biopsy of 1st metatarsal     ANESTHESIA: Mac with 10cc of 1% lidocain plane     HEMOSTASIS: Pneumatic ankle tourniquet @ 250 mmHg for 70 minutes.     ESTIMATED BLOOD LOSS: Less than 50cc.     MATERIALS: none  * No implants in log *     INJECTABLES: 10cc of 0.5% marcaine plain post op     SPECIMEN:   ID Type Source Tests Collected by Time Destination   1 : LEFT FIRST BONE Bone Foot CULTURE, ANAEROBIC AND AEROBIC Stepan Buckley DPM 10/21/2020 1015     A : LEFT HALLUX  Tissue Foot SURGICAL PATHOLOGY Stepan Buckley DPM 10/21/2020 0948     B : LEFT FOOT SOFT TISSUE Tissue Foot SURGICAL PATHOLOGY Stepan Buckley DPM 10/21/2020 0955     C : LEFT FIRST BONE Bone Foot SURGICAL PATHOLOGY Stepan Buckley DPM 10/21/2020 1013     D : LEFT FOOT SESMOID Bone Foot SURGICAL PATHOLOGY, CULTURE, ANAEROBIC AND AEROBIC Stepan Buckley DPM 10/21/2020 1548           COMPLICATIONS: none     FINDINGS: Infected, non viable tissue to the proximal phalanx of the hallux and the sesamoids of the left foot.     The patient was counseled at length about the risks of jossue Covid-19 during their perioperative period and any recovery window from their procedure.  The patient was made aware that jossue Covid-19  may worsen their prognosis for recovering from their procedure  and lend to a higher morbidity and/or mortality risk.  All material risks, benefits, and reasonable alternatives including postponing the procedure were discussed. The patient does wish to proceed with the procedure at this time. INDICATION FOR PROCEDURE: The patient has had an infection of the left hallux for some time. This has failed conservative treatments thus far, and the patient elects to undergo surgical correction. All risks and benefits discussed with the patient in detail. No guarantees were given or implied. Consent signed and in the chart. PROCEDURE IN DETAIL: The patient was transported from pre-op to the operating room and placed on the operating table in the supine position with a safety strap across the lap. After adequate sedation by the Anesthesia, a mcconnell block of 10 cc of 1% lidocaine plain was injected. The foot was then prepped and draped in the usual aseptic fashion. Attention was directed to the right hallux. There was a wound noted to the plantar aspect of the hallux and plantar to the sesamoids, which probed to bone. A #15 blade was used to create two converging semieliptical incisions around the proximal phalanx of the hallux. The hallux was disarticulated at the DIPJ and passed to the back table table. The proximal phalanx was dissected and disarticulated at the 1st MPJ. The fibular sesamoid was then isolated and removed using a #15 blade. Attention was then directed to the tibial sesamoid which was isolated and removed using a #15 blade. These 4 specimens were then sent to pathology and in lab for culture. Next copious amounts of sterile saline was used to irrigate the surgical site. A bone biopsy was then performed of the first metatarsal using a Jamshidi needle. This was then passed off to the back table for pathology and culture. Residual skin was then excised and subcutaneous closure was performed with Vicryl and the skin was closed using nylon. A small gap was left in the central aspect of the incision was left open to allow for drainage.   The small opening measures approximately 1.0 cm; the site was then packed with quarter inch iodoform packing. Dressings consisted of adaptic, 4 x 4s, Kerlix and an Ace bandage. The patient tolerated the above procedure and anesthesia well without complications. The patient was transported from the operating room to the PACU with vital signs stable and vascular status intact to the right foot. Patient was then transferred back to the floor.

## 2020-10-22 NOTE — PROGRESS NOTES
foot.  He was prescribed ciprofloxacin 1 week ago and did not see any improvement. He was seen by Dr. Iva Benavides with podiatry who encouraged him to come to the ER for further evaluation. The patient endorses neuropathy to his bilateral feet, he does report some pain to his left foot that he describes as dull, intermittent and moderate in intensity. It is aggravated with movement. No definitive alleviating factors. He denies fever, chills, nausea or vomiting. No additional symptomology. He has past medical history that includes type 2 diabetes, CKD and RAÚL with nightly BiPAP use.     Creatinine 1.51, WBC 11.1, glucose 280, .1, sed rate 86.\"    Review of Systems:     Constitutional:  negative for chills, fevers, sweats  Respiratory:  negative for cough, dyspnea on exertion, shortness of breath, wheezing  Cardiovascular:  negative for chest pain, chest pressure/discomfort, palpitations  Gastrointestinal:  negative for abdominal pain, constipation, diarrhea, nausea, vomiting  Neurological:  negative for dizziness, headache    Medications:      Allergies:  No Known Allergies    Current Meds:   Scheduled Meds:    insulin lispro  0-12 Units Subcutaneous TID WC    insulin lispro  0-6 Units Subcutaneous Nightly    allopurinol  300 mg Oral Daily    gabapentin  300 mg Oral BID    insulin glargine  30 Units Subcutaneous BID    magnesium oxide  400 mg Oral Daily    therapeutic multivitamin-minerals  1 tablet Oral Daily    sodium chloride flush  10 mL Intravenous 2 times per day    cefepime  2 g Intravenous Q12H    linezolid  600 mg Intravenous Q12H    heparin (porcine)  5,000 Units Subcutaneous 3 times per day     Continuous Infusions:    dextrose       PRN Meds: oxyCODONE **OR** oxyCODONE, LORazepam, melatonin, sodium chloride flush, potassium chloride **OR** potassium alternative oral replacement **OR** potassium chloride, magnesium sulfate, acetaminophen **OR** acetaminophen, polyethylene glycol, promethazine **OR** ondansetron, nicotine, glucose, dextrose, glucagon (rDNA), dextrose, morphine    Data:     Past Medical History:   has a past medical history of Chronic kidney disease, Diabetes mellitus ([facility] [address] Diabetic neuropathy ([facility] [address] History of kidney stones, and Sleep apnea. Social History:   reports that he has never smoked. He has never used smokeless tobacco. He reports current alcohol use. He reports that he does not use drugs. Family History:   Family History   Problem Relation Age of Onset    Heart Disease Mother     Heart Disease Father        Vitals:  BP (!) 126/53   Pulse 73   Temp 98.4 °F (36.9 °C) (Oral)   Resp 18   Ht 6' 4\" (1.93 m)   Wt (!) 360 lb 9.6 oz (163.6 kg)   SpO2 93%   BMI 43.89 kg/m²   Temp (24hrs), Av.8 °F (37.1 °C), Min:98.4 °F (36.9 °C), Max:99.9 °F (37.7 °C)    Recent Labs     10/21/20  1619 10/21/20  2126 10/22/20  0618 10/22/20  1111   POCGLU 330* 240* 221* 312*       I/O (24Hr):     Intake/Output Summary (Last 24 hours) at 10/22/2020 1345  Last data filed at 10/22/2020 0836  Gross per 24 hour   Intake 50 ml   Output 1050 ml   Net -1000 ml       Labs:  Hematology:  Recent Labs     10/19/20  1930 10/20/20  0549 10/21/20  0453   WBC 11.1 10.0 9.0   RBC 4.29 4.36 4.18*   HGB 13.0 13.0 12.5*   HCT 40.3* 41.0 38.6*   MCV 93.9 94.0 92.3   MCH 30.3 29.8 29.9   MCHC 32.3 31.7 32.4   RDW 12.0 11.9 11.9    281 293   MPV 8.9 10.5 8.8   SEDRATE 86*  --   --    .1*  --  103.9*   INR  --  1.1  --      Chemistry:  Recent Labs     10/19/20  1930 10/20/20  0549 10/21/20  0453   * 137 136   K 4.4 4.4 4.9   CL 98 103 102   CO2 23 24 27   GLUCOSE 280* 186* 212*   BUN 30* 23 21   CREATININE 1.51* 1.38* 1.44*   ANIONGAP 12 10 7*   LABGLOM 47* 52* 50*   GFRAA 57* >60 >60   CALCIUM 9.4 9.6 9.5     Recent Labs     10/21/20  0624 10/21/20  1101 10/21/20  1619 10/21/20  2126 10/22/20  0618 10/22/20  1111   POCGLU 162* 197* 330* 240* 221* 312*     ABG:No results found for: POCPH, PHART, PH, POCPCO2, GOL9NOY, PCO2, POCPO2, PO2ART, PO2, POCHCO3, DBW0FSM, HCO3, NBEA, PBEA, BEART, BE, THGBART, THB, TFE0IZR, QPKD5SSV, M5KQCBCJ, O2SAT, FIO2  Lab Results   Component Value Date/Time    SPECIAL NOT REPORTED 10/21/2020 10:16 AM     Lab Results   Component Value Date/Time    CULTURE PENDING 10/21/2020 10:16 AM       Radiology:  Rajni Meter Foot Left (min 3 Views)    Result Date: 10/19/2020  Abnormality of the base of the 1st proximal phalanx. This appearance could be caused by chronic infectious process or fracture. MRI may be helpful for better characterization     Mri Foot Left Wo Contrast    Result Date: 10/20/2020  1. Plantar ulceration at the level of the 1st MTP joint with large focal underlying soft tissue abnormality measuring 4.9 x 2.1 x 4.3 cm. Phlegmonous change is felt most likely. Infectious adventitial bursitis is an alternate consideration. 2. Flexor hallucis longus tenosynovitis and partial thickness tearing. The tendon sheath appears to communicate with the plantar soft tissue collection raising concern for infectious tenosynovitis. Additionally there is tenosynovitis of the proximal flexor digitorum longus tendon sheaths which may also be infectious. 3. Joint effusion/synovitis at the 1st MTP joint with associated marrow signal abnormality. Septic arthritis and osteomyelitis should be considered. 4. Marrow signal abnormality of the 1st distal phalanx may reflect early osteomyelitis.   Phlegmonous change appears to extend along the plantar aspect of the great toe.     ekg today shows inferior q waves III, avf and poor r wave progression anteriorly, suggestive of possible old mi-which he denies; the ekg is basically same as from jan 2016 ekg     Physical Examination:        General appearance:  alert, cooperative and no distress  Mental Status:  oriented to person, place and time and normal affect  Lungs:  clear to auscultation bilaterally, normal effort  Heart:  regular rate and rhythm, no murmur  Abdomen:  soft, nontender, nondistended, normal bowel sounds, no masses, hepatomegaly, splenomegaly; obese  Extremities:  no edema, redness, tenderness in the calves  Skin:  See pics right foot    Assessment:        Hospital Problems           Last Modified POA    * (Principal) Non-healing ulcer of foot, left, with unspecified severity ([address] 10/19/2020 Yes    Type 2 diabetes mellitus with hyperglycemia, with long-term current use of insulin ([address] 10/19/2020 Yes    Diabetic autonomic neuropathy associated with type 2 diabetes mellitus ([address] 10/19/2020 Yes    Obstructive sleep apnea 10/19/2020 Yes    Class 3 severe obesity due to excess calories with serious comorbidity and body mass index (BMI) of 45.0 to 49.9 in adult [facility]) 10/19/2020 Yes    CKD (chronic kidney disease) stage 3, GFR 30-59 ml/min 10/20/2020 Yes          Plan:        Increase lantus to try and improve glucose levels to promote healing  Cont home cpap  Awaiting final cultures to streamline antibiotics      Ellis Dai DO  10/22/2020  1:45 PM

## 2020-10-22 NOTE — PROGRESS NOTES
Transitions of Care Pharmacy Service   Medication Review    The patient's list of current home medications has been reviewed. Source(s) of information: patient, OARRS, Surescripts refill report    Other Notes He states he will need a new prescription for Lantus           Please feel free to call me with any questions about this encounter. Thank you. Filomena Ruvalcaba [address] [place] [address]   Transitions  Care Pharmacy Service  Phone:  [phone]  Fax: [phone]      Electronically signed by Filomena Ruvalcaba [address] [place] [address] on 10/22/2020 at 5:42 PM         Medications Prior to Admission:   diclofenac (VOLTAREN) 75 MG EC tablet, Take 75 mg by mouth 2 times daily  Liniments (BLUE-EMU SUPER STRENGTH) CREA, Apply topically as needed (to hands)  diclofenac sodium (VOLTAREN) 1 % GEL, Apply 2 g topically 4 times daily as needed for Pain  oxyCODONE-acetaminophen (PERCOCET)  MG per tablet, Take 0.5-1 tablets by mouth nightly as needed for Pain. insulin glargine (LANTUS) 100 UNIT/ML injection vial, Inject 40 Units into the skin nightly   melatonin 5 MG TABS tablet, Take 5-10 mg by mouth nightly   fluticasone (FLONASE) 50 MCG/ACT nasal spray, 1-2 sprays by Each Nostril route daily as needed for Allergies   Multiple Vitamins-Minerals (THERAPEUTIC MULTIVITAMIN-MINERALS) tablet, Take 1 tablet by mouth daily  magnesium (MAGNESIUM-OXIDE) 250 MG TABS tablet, Take 250 mg by mouth daily  gabapentin (NEURONTIN) 300 MG capsule, Take 300 mg by mouth 2 times daily as needed (nerve pain).    glipiZIDE-metformin (METAGLIP) 5-500 MG per tablet, Take 2 tablets by mouth 2 times daily   allopurinol (ZYLOPRIM) 300 MG tablet, Take 300 mg by mouth daily  zaleplon (SONATA) 10 MG capsule, Take 10 mg by mouth nightly

## 2020-10-22 NOTE — PROGRESS NOTES
[facility]  Progress Note    Shirley Wilcox  MRN: [id number]  Date: 10/22/2020  LOS: 3     Reason for F/U :   Infected diabetic foot ulcer/osteomyelitis    Impression :   1. Left-sided diabetic foot ulcer with associated infection/osteomyelitis  · Status post great toe amputation and bone biopsy of the first metatarsal 10/21/2020  2. Diabetes mellitus type 2 with associated neuropathy. 3. Diabetic nephropathy with chronic kidney disease stage III. 4. Obstructive sleep apnea with nightly BiPAP use. 5. Morbid obesity    Recommendations:   · The patient so far has Enterococcus faecalis growing on culture. · There was a gram-negative ramirez noted but so far not growing on culture. · Continue cefepime and Zyvox for now. · The wound is mostly closed and we are awaiting the surgical pathology for the metatarsal bone biopsy  · We will make final antibiotic arrangements when the cultures are final and the biopsy data is available    Infection Control Recommendations:   Diabetic foot infection/osteomyelitis    Discharge Planning:   Estimated Length of IV antimicrobials: To be determined  Patient will need Midline Catheter Insertion/ PICC line Insertion: No  Patient will need: Home IV , [place],  SNF,  LTAC: Undetermined  Patient willneed outpatient wound care: No    Medical Decision making / Summary of Stay:   Shirley Wilcox is a 63y.o.-year-old male who was initially admitted on 10/19/2020. Salvatore Ray has a history of diabetes mellitus type 2 with associated diabetic neuropathy, morbid obesity, obstructive sleep apnea on CPAP, chronic kidney disease and he has had chronic issues with ulcerations on the plantar aspect of his left foot by the subfirst metatarsal.  The patient has been following with wound care and the ulceration has been present for at least a year.   The patient was hospitalized here in February 2019 with a left-sided diabetic foot infection secondary to Morganella morganii as well as Enterococcus faecalis. The patient reports that the wound had healed/closed about 2 weeks ago. He subsequently developed soft tissue swelling and an abscess which started to drain spontaneously and he reports having 2 open wounds on the plantar aspect of his foot. The patient went to see his podiatrist for follow-up and it was recommended that he be sent into the hospital for admission to receive IV antibiotic therapy and possible surgical drainage. He did not report any subjective fevers or chills. He has had some arthralgias and myalgias in the left foot with some swelling and redness as well as to the draining ulcerations form. The patient was admitted with a left-sided diabetic foot infection and has been seen by the podiatry service and there was concern for a soft tissue abscess with associated osteomyelitis    Patient was started on cefepime and Zyvox  The patient was seen by the vascular surgery team did undergo vascular studies and though there was some calcinosis there was adequate peripheral blood flow.     The patient had an MRI of the foot done that suggested osteomyelitis of the proximal phalanx as well as of the sesamoids of the left foot. The patient was taken to the operating room today 10/21/2020 and underwent amputation of the hallux, removal of the tibial sesamoid, removal of the fibular sesamoid, with bone biopsy of the first metatarsal.    Current evaluation:10/22/2020    /68   Pulse 83   Temp 98.4 °F (36.9 °C) (Oral)   Resp 16   Ht 6' 4\" (1.93 m)   Wt (!) 360 lb 9.6 oz (163.6 kg)   SpO2 93%   BMI 43.89 kg/m²     Temperature Range: Temp: 98.4 °F (36.9 °C) Temp  Av.8 °F (37.1 °C)  Min: 98.4 °F (36.9 °C)  Max: 99.9 °F (37.7 °C)  The patient is seen and evaluated at bedside he is awake and alert in no acute distress. No subjective fever or chills. No cough or shortness of breath. No abdominal pain nausea or vomiting  The dressing change was done.   Today by the podiatry service. Review of Systems   Constitutional: Negative. Respiratory: Negative. Cardiovascular: Negative. Gastrointestinal: Negative. Genitourinary: Negative. Musculoskeletal: Negative. Skin: Positive for wound. Allergic/Immunologic: Negative. Neurological: Negative. Physical Examination :     Physical Exam  Constitutional:       Appearance: He is well-developed. HENT:      Head: Normocephalic and atraumatic. Neck:      Musculoskeletal: Normal range of motion and neck supple. Cardiovascular:      Rate and Rhythm: Normal rate. Heart sounds: Normal heart sounds. No friction rub. No gallop. Pulmonary:      Effort: Pulmonary effort is normal.      Breath sounds: Normal breath sounds. No wheezing. Abdominal:      General: Bowel sounds are normal.      Palpations: Abdomen is soft. There is no mass. Tenderness: There is no abdominal tenderness. Musculoskeletal: Normal range of motion. Lymphadenopathy:      Cervical: No cervical adenopathy. Skin:     General: Skin is warm and dry. Comments: The dressing is in place with the pictures from podiatry this morning were reviewed   Neurological:      Mental Status: He is alert and oriented to person, place, and time. Laboratory data:   I have independently reviewed the followinglabs:  CBC with Differential:   Recent Labs     10/19/20  1930 10/20/20  0549 10/21/20  0453   WBC 11.1 10.0 9.0   HGB 13.0 13.0 12.5*   HCT 40.3* 41.0 38.6*    281 293   LYMPHOPCT 12*  --   --    MONOPCT 10  --   --      BMP:   Recent Labs     10/20/20  0549 10/21/20  0453    136   K 4.4 4.9    102   CO2 24 27   BUN 23 21   CREATININE 1.38* 1.44*     Hepatic Function Panel: No results for input(s): PROT, LABALBU, BILIDIR, IBILI, BILITOT, ALKPHOS, ALT, AST in the last 72 hours.       No results found for: PROCAL  Lab Results   Component Value Date    .1 10/19/2020    CRP 97.3 02/18/2019 .4 02/17/2019     Lab Results   Component Value Date    SEDRATE 80 (H) 10/19/2020         No results found for: DDIMER  No results found for: FERRITIN  No results found for: LDH  No results found for: FIBRINOGEN    Results in Past 30 Days  Result Component Current Result Ref Range Previous Result Ref Range   SARS-CoV-2      (10/19/2020)  Not in Time Range          (10/19/2020)       Not Detected (10/19/2020) Not Detected       Lab Results   Component Value Date    COVID19 Not Detected 10/19/2020       No results for input(s): VANCOTROUGH in the last 72 hours. Imaging Studies:   No new imaging    Cultures:     Culture, Anaerobic and Aerobic [[id number]]  (Abnormal)   Collected: 10/19/20 2008    Order Status: Completed  Specimen: Foot  Updated: 10/21/20 1807     Specimen Description  . FOOT LEFT FOOT     Special Requests  NOT REPORTED     Direct Exam  FEW NEUTROPHILSAbnormal        FEW GRAM POSITIVE COCCI IN CLUSTERSAbnormal        RARE GRAM NEGATIVE RODSAbnormal       Culture  ENTEROCOCCUS FAECALIS LIGHT [address]        NORMAL CHHAYA    Culture, Anaerobic and Aerobic [[id number]]   Collected: 10/21/20 1015    Order Status: Completed  Specimen: Bone from Foot  Updated: 10/21/20 1651     Specimen Description  . FOOT LEFT 1ST BONE     Special Requests  NOT REPORTED     Direct Exam  NO NEUTROPHILS SEEN      NO BACTERIA SEEN     Culture  PENDING    Culture, Anaerobic and Aerobic [[id number]]  (Abnormal)  Collected: 10/21/20 1016    Order Status: Completed  Specimen: Bone from Foot  Updated: 10/21/20 1648     Specimen Description  . FOOT LEFT SESMOID     Special Requests  NOT REPORTED     Direct Exam  FEW NEUTROPHILSAbnormal        FEW GRAM POSITIVE COCCI IN PAIRS AND CLUSTERSAbnormal       Culture  PENDING        Medications:      insulin lispro  0-12 Units Subcutaneous TID WC    insulin lispro  0-6 Units Subcutaneous Nightly    allopurinol  300 mg Oral Daily    gabapentin  300 mg Oral BID    insulin

## 2020-10-23 LAB
ABSOLUTE EOS #: 0.37 K/UL (ref 0–0.44)
ABSOLUTE IMMATURE GRANULOCYTE: 0.15 K/UL (ref 0–0.3)
ABSOLUTE LYMPH #: 2 K/UL (ref 1.1–3.7)
ABSOLUTE MONO #: 0.66 K/UL (ref 0.1–1.2)
ANION GAP SERPL CALCULATED.3IONS-SCNC: 8 MMOL/L (ref 9–17)
BASOPHILS # BLD: 1 % (ref 0–2)
BASOPHILS ABSOLUTE: 0.05 K/UL (ref 0–0.2)
BUN BLDV-MCNC: 19 MG/DL (ref 8–23)
BUN/CREAT BLD: 15 (ref 9–20)
C-REACTIVE PROTEIN: 94.2 MG/L (ref 0–5)
CALCIUM SERPL-MCNC: 9.7 MG/DL (ref 8.6–10.4)
CHLORIDE BLD-SCNC: 100 MMOL/L (ref 98–107)
CO2: 27 MMOL/L (ref 20–31)
CREAT SERPL-MCNC: 1.31 MG/DL (ref 0.7–1.2)
CULTURE: ABNORMAL
DIFFERENTIAL TYPE: ABNORMAL
DIRECT EXAM: ABNORMAL
DIRECT EXAM: ABNORMAL
EOSINOPHILS RELATIVE PERCENT: 5 % (ref 1–4)
GFR AFRICAN AMERICAN: >60 ML/MIN
GFR NON-AFRICAN AMERICAN: 56 ML/MIN
GFR SERPL CREATININE-BSD FRML MDRD: ABNORMAL ML/MIN/{1.73_M2}
GFR SERPL CREATININE-BSD FRML MDRD: ABNORMAL ML/MIN/{1.73_M2}
GLUCOSE BLD-MCNC: 200 MG/DL (ref 75–110)
GLUCOSE BLD-MCNC: 211 MG/DL (ref 70–99)
GLUCOSE BLD-MCNC: 256 MG/DL (ref 75–110)
GLUCOSE BLD-MCNC: 298 MG/DL (ref 75–110)
GLUCOSE BLD-MCNC: 312 MG/DL (ref 75–110)
HCT VFR BLD CALC: 40 % (ref 40.7–50.3)
HEMOGLOBIN: 12.8 G/DL (ref 13–17)
IMMATURE GRANULOCYTES: 2 %
LYMPHOCYTES # BLD: 26 % (ref 24–43)
Lab: ABNORMAL
MCH RBC QN AUTO: 29.4 PG (ref 25.2–33.5)
MCHC RBC AUTO-ENTMCNC: 32 G/DL (ref 28.4–34.8)
MCV RBC AUTO: 91.7 FL (ref 82.6–102.9)
MONOCYTES # BLD: 9 % (ref 3–12)
NRBC AUTOMATED: 0 PER 100 WBC
PDW BLD-RTO: 11.9 % (ref 11.8–14.4)
PLATELET # BLD: 321 K/UL (ref 138–453)
PLATELET ESTIMATE: ABNORMAL
PMV BLD AUTO: 9 FL (ref 8.1–13.5)
POTASSIUM SERPL-SCNC: 4.3 MMOL/L (ref 3.7–5.3)
RBC # BLD: 4.36 M/UL (ref 4.21–5.77)
RBC # BLD: ABNORMAL 10*6/UL
SEG NEUTROPHILS: 57 % (ref 36–65)
SEGMENTED NEUTROPHILS ABSOLUTE COUNT: 4.57 K/UL (ref 1.5–8.1)
SODIUM BLD-SCNC: 135 MMOL/L (ref 135–144)
SPECIMEN DESCRIPTION: ABNORMAL
WBC # BLD: 7.8 K/UL (ref 3.5–11.3)
WBC # BLD: ABNORMAL 10*3/UL

## 2020-10-23 PROCEDURE — 1200000000 HC SEMI PRIVATE

## 2020-10-23 PROCEDURE — 97166 OT EVAL MOD COMPLEX 45 MIN: CPT

## 2020-10-23 PROCEDURE — 6370000000 HC RX 637 (ALT 250 FOR IP): Performed by: INTERNAL MEDICINE

## 2020-10-23 PROCEDURE — 99232 SBSQ HOSP IP/OBS MODERATE 35: CPT | Performed by: INTERNAL MEDICINE

## 2020-10-23 PROCEDURE — 2580000003 HC RX 258: Performed by: STUDENT IN AN ORGANIZED HEALTH CARE EDUCATION/TRAINING PROGRAM

## 2020-10-23 PROCEDURE — 80048 BASIC METABOLIC PNL TOTAL CA: CPT

## 2020-10-23 PROCEDURE — 2580000003 HC RX 258: Performed by: INTERNAL MEDICINE

## 2020-10-23 PROCEDURE — 85025 COMPLETE CBC W/AUTO DIFF WBC: CPT

## 2020-10-23 PROCEDURE — 86140 C-REACTIVE PROTEIN: CPT

## 2020-10-23 PROCEDURE — 36415 COLL VENOUS BLD VENIPUNCTURE: CPT

## 2020-10-23 PROCEDURE — 97535 SELF CARE MNGMENT TRAINING: CPT

## 2020-10-23 PROCEDURE — 6370000000 HC RX 637 (ALT 250 FOR IP): Performed by: STUDENT IN AN ORGANIZED HEALTH CARE EDUCATION/TRAINING PROGRAM

## 2020-10-23 PROCEDURE — 97164 PT RE-EVAL EST PLAN CARE: CPT

## 2020-10-23 PROCEDURE — 97116 GAIT TRAINING THERAPY: CPT

## 2020-10-23 PROCEDURE — 6360000002 HC RX W HCPCS: Performed by: INTERNAL MEDICINE

## 2020-10-23 PROCEDURE — 6360000002 HC RX W HCPCS: Performed by: STUDENT IN AN ORGANIZED HEALTH CARE EDUCATION/TRAINING PROGRAM

## 2020-10-23 PROCEDURE — 82947 ASSAY GLUCOSE BLOOD QUANT: CPT

## 2020-10-23 RX ORDER — OXYCODONE HYDROCHLORIDE AND ACETAMINOPHEN 5; 325 MG/1; MG/1
1 TABLET ORAL EVERY 6 HOURS PRN
Qty: 28 TABLET | Refills: 0 | Status: SHIPPED | OUTPATIENT
Start: 2020-10-23 | End: 2020-10-29 | Stop reason: SDUPTHER

## 2020-10-23 RX ADMIN — SODIUM CHLORIDE, PRESERVATIVE FREE 10 ML: 5 INJECTION INTRAVENOUS at 21:05

## 2020-10-23 RX ADMIN — SODIUM CHLORIDE, PRESERVATIVE FREE 10 ML: 5 INJECTION INTRAVENOUS at 07:41

## 2020-10-23 RX ADMIN — GABAPENTIN 300 MG: 300 CAPSULE ORAL at 07:40

## 2020-10-23 RX ADMIN — HEPARIN SODIUM 5000 UNITS: 5000 INJECTION INTRAVENOUS; SUBCUTANEOUS at 05:51

## 2020-10-23 RX ADMIN — OXYCODONE HYDROCHLORIDE 10 MG: 5 TABLET ORAL at 05:51

## 2020-10-23 RX ADMIN — INSULIN LISPRO 4 UNITS: 100 INJECTION, SOLUTION INTRAVENOUS; SUBCUTANEOUS at 21:00

## 2020-10-23 RX ADMIN — INSULIN LISPRO 4 UNITS: 100 INJECTION, SOLUTION INTRAVENOUS; SUBCUTANEOUS at 07:42

## 2020-10-23 RX ADMIN — HEPARIN SODIUM 5000 UNITS: 5000 INJECTION INTRAVENOUS; SUBCUTANEOUS at 13:31

## 2020-10-23 RX ADMIN — SODIUM CHLORIDE 3 G: 900 INJECTION INTRAVENOUS at 15:24

## 2020-10-23 RX ADMIN — SODIUM CHLORIDE 3 G: 900 INJECTION INTRAVENOUS at 21:00

## 2020-10-23 RX ADMIN — INSULIN LISPRO 6 UNITS: 100 INJECTION, SOLUTION INTRAVENOUS; SUBCUTANEOUS at 16:45

## 2020-10-23 RX ADMIN — GABAPENTIN 300 MG: 300 CAPSULE ORAL at 20:59

## 2020-10-23 RX ADMIN — INSULIN GLARGINE 40 UNITS: 100 INJECTION, SOLUTION SUBCUTANEOUS at 21:00

## 2020-10-23 RX ADMIN — ALLOPURINOL 300 MG: 300 TABLET ORAL at 07:40

## 2020-10-23 RX ADMIN — CEFEPIME HYDROCHLORIDE 2 G: 2 INJECTION, POWDER, FOR SOLUTION INTRAVENOUS at 07:43

## 2020-10-23 RX ADMIN — OXYCODONE HYDROCHLORIDE 10 MG: 5 TABLET ORAL at 10:50

## 2020-10-23 RX ADMIN — MAGNESIUM GLUCONATE 500 MG ORAL TABLET 400 MG: 500 TABLET ORAL at 07:40

## 2020-10-23 RX ADMIN — INSULIN LISPRO 6 UNITS: 100 INJECTION, SOLUTION INTRAVENOUS; SUBCUTANEOUS at 11:59

## 2020-10-23 RX ADMIN — INSULIN GLARGINE 40 UNITS: 100 INJECTION, SOLUTION SUBCUTANEOUS at 07:42

## 2020-10-23 RX ADMIN — OXYCODONE HYDROCHLORIDE 10 MG: 5 TABLET ORAL at 20:59

## 2020-10-23 RX ADMIN — OXYCODONE HYDROCHLORIDE 10 MG: 5 TABLET ORAL at 16:26

## 2020-10-23 RX ADMIN — MULTIPLE VITAMINS W/ MINERALS TAB 1 TABLET: TAB at 07:40

## 2020-10-23 RX ADMIN — OXYCODONE HYDROCHLORIDE 10 MG: 5 TABLET ORAL at 00:20

## 2020-10-23 RX ADMIN — LINEZOLID 600 MG: 600 INJECTION, SOLUTION INTRAVENOUS at 08:55

## 2020-10-23 RX ADMIN — HEPARIN SODIUM 5000 UNITS: 5000 INJECTION INTRAVENOUS; SUBCUTANEOUS at 21:00

## 2020-10-23 ASSESSMENT — PAIN DESCRIPTION - PROGRESSION
CLINICAL_PROGRESSION: NOT CHANGED

## 2020-10-23 ASSESSMENT — PAIN DESCRIPTION - ONSET
ONSET: ON-GOING

## 2020-10-23 ASSESSMENT — ENCOUNTER SYMPTOMS
GASTROINTESTINAL NEGATIVE: 1
ALLERGIC/IMMUNOLOGIC NEGATIVE: 1
RESPIRATORY NEGATIVE: 1

## 2020-10-23 ASSESSMENT — PAIN - FUNCTIONAL ASSESSMENT
PAIN_FUNCTIONAL_ASSESSMENT: PREVENTS OR INTERFERES SOME ACTIVE ACTIVITIES AND ADLS

## 2020-10-23 ASSESSMENT — PAIN SCALES - GENERAL
PAINLEVEL_OUTOF10: 7
PAINLEVEL_OUTOF10: 4
PAINLEVEL_OUTOF10: 8
PAINLEVEL_OUTOF10: 7

## 2020-10-23 ASSESSMENT — PAIN DESCRIPTION - DESCRIPTORS
DESCRIPTORS: ACHING
DESCRIPTORS: ACHING
DESCRIPTORS: ACHING;DISCOMFORT
DESCRIPTORS: ACHING
DESCRIPTORS: ACHING

## 2020-10-23 ASSESSMENT — PAIN DESCRIPTION - ORIENTATION
ORIENTATION: LEFT

## 2020-10-23 ASSESSMENT — PAIN DESCRIPTION - FREQUENCY
FREQUENCY: INTERMITTENT

## 2020-10-23 ASSESSMENT — PAIN DESCRIPTION - PAIN TYPE
TYPE: ACUTE PAIN

## 2020-10-23 ASSESSMENT — PAIN DESCRIPTION - LOCATION
LOCATION: ANKLE;FOOT
LOCATION: ANKLE;FOOT
LOCATION: FOOT;ANKLE

## 2020-10-23 NOTE — PROGRESS NOTES
been present for at least a year. 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 The patient reports that the wound had healed/closed about 2 weeks ago. He subsequently developed soft tissue swelling and an abscess which started to drain spontaneously and he reports having 2 open wounds on the plantar aspect of his foot. The patient went to see his podiatrist for follow-up and it was recommended that he be sent into the hospital for admission to receive IV antibiotic therapy and possible surgical drainage. He did not report any subjective fevers or chills. He has had some arthralgias and myalgias in the left foot with some swelling and redness as well as to the draining ulcerations form. The patient was admitted with a left-sided diabetic foot infection and has been seen by the podiatry service and there was concern for a soft tissue abscess with associated osteomyelitis    Patient was started on cefepime and Zyvox  The patient was seen by the vascular surgery team did undergo vascular studies and though there was some calcinosis there was adequate peripheral blood flow.     The patient had an MRI of the foot done that suggested osteomyelitis of the proximal phalanx as well as of the sesamoids of the left foot.   The patient was taken to the operating room today 10/21/2020 and underwent amputation of the hallux, removal of the tibial sesamoid, removal of the fibular sesamoid, with bone biopsy of the first metatarsal.    Current evaluation:10/23/2020    /61   Pulse 72   Temp 98.2 °F (36.8 °C) (Oral)   Resp 18   Ht 6' 4\" (1.93 m)   Wt (!) 361 lb (163.7 kg)   SpO2 93%   BMI 43.94 kg/m²     Temperature Range: Temp: 98.2 °F (36.8 °C) Temp  Av.5 °F (36.9 °C)  Min: 98.2 °F (36.8 °C)  Max: 98.9 °F (37.2 °C)  The patient is seen and evaluated at bedside he is awake and alert in no acute distress. No subjective fever or chills. No abdominal pain nausea vomiting or diarrhea. Review of Systems   Constitutional: Negative. Respiratory: Negative. Cardiovascular: Negative. Gastrointestinal: Negative. Genitourinary: Negative. Musculoskeletal: Negative. Skin: Positive for wound. Allergic/Immunologic: Negative. Neurological: Negative. Physical Examination :     Physical Exam  Constitutional:       Appearance: He is well-developed. HENT:      Head: Normocephalic and atraumatic. Neck:      Musculoskeletal: Normal range of motion and neck supple. Cardiovascular:      Rate and Rhythm: Normal rate. Heart sounds: Normal heart sounds. No friction rub. No gallop. Pulmonary:      Effort: Pulmonary effort is normal.      Breath sounds: Normal breath sounds. No wheezing. Abdominal:      General: Bowel sounds are normal.      Palpations: Abdomen is soft. There is no mass. Tenderness: There is no abdominal tenderness. Musculoskeletal: Normal range of motion. Lymphadenopathy:      Cervical: No cervical adenopathy. Skin:     General: Skin is warm and dry. Comments: The dressing is in place with the pictures from podiatry this morning were reviewed   Neurological:      Mental Status: He is alert and oriented to person, place, and time. Laboratory data:   I have independently reviewed the followinglabs:  CBC with Differential:   Recent Labs     10/21/20  0453 10/23/20  0540   WBC 9.0 7.8   HGB 12.5* 12.8*   HCT 38.6* 40.0*    321   LYMPHOPCT  --  26   MONOPCT  --  9     BMP:   Recent Labs     10/21/20  0453 10/23/20  0540    135   K 4.9 4.3    100   CO2 27 27   BUN 21 19   CREATININE 1.44* 1.31*     Hepatic Function Panel: No results for input(s): PROT, LABALBU, BILIDIR, IBILI, BILITOT, ALKPHOS, ALT, AST in the last 72 hours.       No results found for: PROCAL  Lab Results   Component Value Date    .9 10/21/2020    .1 10/19/2020    CRP 97.3 02/18/2019     Lab Results   Component Value Date    SEDRATE 80 (H) 10/19/2020         No results found for: DDIMER  No results found for: FERRITIN  No results found for: LDH  No results found for: FIBRINOGEN    Results in Past 30 Days  Result Component Current Result Ref Range Previous Result Ref Range   SARS-CoV-2      (10/19/2020)  Not in Time Range          (10/19/2020)       Not Detected (10/19/2020) Not Detected       Lab Results   Component Value Date    COVID19 Not Detected 10/19/2020       No results for input(s): VANCOTROUGH in the last 72 hours. Imaging Studies:   No new imaging    Cultures:     Culture, Anaerobic and Aerobic [[id number]]  (Abnormal)   Collected: 10/19/20 2008    Order Status: Completed  Specimen: Foot  Updated: 10/22/20 2228     Specimen Description  . FOOT LEFT FOOT     Special Requests  NOT REPORTED     Direct Exam  FEW NEUTROPHILSAbnormal        FEW GRAM POSITIVE COCCI IN CLUSTERSAbnormal        RARE GRAM NEGATIVE RODSAbnormal       Culture  ENTEROCOCCUS FAECALIS LIGHT GROWTHAbnormal        NORMAL CHHAYA      FINEGOLDIA MAGNA LIGHT GROWTHAbnormal     Enterococcus  faecalis (1)     Antibiotic  Interpretation  RICHARD  Status     ampicillin  Sensitive   Preliminary      <=2   SUSCEPTIBLE    penicillin    Preliminary      NOT REPORTED    ciprofloxacin    Preliminary      NOT REPORTED    erythromycin    Preliminary      NOT REPORTED    Gentamicin, High Level   NOT REPORTED  Preliminary     levofloxacin    Preliminary      NOT REPORTED    linezolid    Preliminary      NOT REPORTED    nitrofurantoin    Preliminary      NOT REPORTED    Synercid    Preliminary      NOT REPORTED    Streptomycin, Hi Level   NOT REPORTED  Preliminary     tetracycline    Preliminary      NOT REPORTED    tigecycline    Preliminary      NOT REPORTED    vancomycin  Sensitive   Preliminary      1   SUSCEPTIBLE      Culture, Anaerobic and Aerobic [[id number]]  (Abnormal)  Collected: 10/21/20 1016 Order Status: Completed  Specimen: Bone from Foot  Updated: 10/22/20 2118     Specimen Description  . FOOT LEFT SESMOID     Special Requests  NOT REPORTED     Direct Exam  FEW NEUTROPHILSAbnormal        FEW GRAM POSITIVE COCCI IN PAIRS AND CLUSTERSAbnormal       Culture  PRESUMPTIVE ID: GROUP D ENTEROCOCCUS LIGHT GROWTH      NORMAL CHHAYA        Culture, Anaerobic and Aerobic [[id number]]  (Abnormal)  Collected: 10/21/20 1015    Order Status: Completed  Specimen: Bone from Foot  Updated: 10/22/20 2037     Specimen Description  . FOOT LEFT 1ST BONE     Special Requests  NOT REPORTED     Direct Exam  NO NEUTROPHILS SEEN      NO BACTERIA SEEN     Culture  STAPHYLOCOCCUS SPECIES, COAGULASE NEGATIVE 1 COLONY FORMING UNIT Susceptibilities have not been performed.  Notify the laboratory within 7 days for further workup if clinically indicated. Abnormal         Medications:      insulin glargine  40 Units Subcutaneous BID    insulin lispro  0-12 Units Subcutaneous TID WC    insulin lispro  0-6 Units Subcutaneous Nightly    allopurinol  300 mg Oral Daily    gabapentin  300 mg Oral BID    magnesium oxide  400 mg Oral Daily    therapeutic multivitamin-minerals  1 tablet Oral Daily    sodium chloride flush  10 mL Intravenous 2 times per day    cefepime  2 g Intravenous Q12H    linezolid  600 mg Intravenous Q12H    heparin (porcine)  5,000 Units Subcutaneous 3 times per day           [facility]  Mukund Bhatia  [employer] messaging  OFFICE: [phone]      Electronically signed by Mukund Bhatia MD on 10/23/2020 at 1:03 PM  Thank you for allowing us to participate in the care of this patient. Please call with questions.     This note iscreated with the assistance of a speech recognition program.  While intending to generate a document that actually reflects the content of the visit, the document can still have some errors including those of syntax andsound a like substitutions which may escape proof reading. In such instances, actual meaning can be extrapolated by contextual diversion.

## 2020-10-23 NOTE — PROGRESS NOTES
Occupational Therapy   Occupational Therapy Initial Assessment  Date: 10/23/2020   Patient Name: Bethany Coronado  MRN: [id number]     : 1960    Date of Service: 10/23/2020    Discharge Recommendations:  [address] RN reports patient is medically stable for therapy treatment this date. Chart reviewed prior to treatment and patient is agreeable for therapy. All lines intact and patient positioned comfortably at end of treatment. All patient needs addressed prior to ending therapy session. Assessment   Performance deficits / Impairments: Decreased functional mobility ; Decreased ADL status; Decreased strength;Decreased safe awareness;Decreased balance  Prognosis: Good  Decision Making: Medium Complexity  OT Education: OT Role;Plan of Care;Precautions; Family Education;Equipment;ADL Adaptive Strategies;Transfer Training  REQUIRES OT FOLLOW UP: Yes  Activity Tolerance  Activity Tolerance: Patient Tolerated treatment well  Activity Tolerance: limited by inability to maintain NWB  Safety Devices  Safety Devices in place: Yes  Type of devices: Call light within reach; Patient at risk for falls; Left in bed;Nurse notified; Bed alarm in place           Patient Diagnosis(es): The primary encounter diagnosis was Diabetic ulcer of left foot associated with type 2 diabetes mellitus, with other ulcer severity, unspecified part of foot ([address] Diagnoses of Cellulitis of left lower extremity and Acute post-operative pain were also pertinent to this visit. has a past medical history of Chronic kidney disease, Diabetes mellitus ([address] Diabetic neuropathy ([address] History of kidney stones, and Sleep apnea. has a past surgical history that includes Lithotripsy (); Appendectomy; Nasal septum surgery; Foot Debridement (Left, 2016); skin split graft (Left, 2016); Foot surgery (Left, 2019); Foot Debridement (Left, 2019); Foot surgery (Left, 2019);  Foot surgery (Left, 03/12/2019); Foot Debridement (Left, 3/12/2019); other surgical history (Left, 04/03/2019); Skin graft (Left, 4/3/2019); Foot Debridement (Left, 04/18/2019); Skin graft (Left, 4/18/2019); Skin graft (Left, 5/7/2019); and Toe amputation (Left, 10/21/2020). Restrictions  Restrictions/Precautions  Restrictions/Precautions: Weight Bearing, Fall Risk, General Precautions  Required Braces or Orthoses?: No  Lower Extremity Weight Bearing Restrictions  Left Lower Extremity Weight Bearing: Non Weight Bearing  Required Braces or Orthoses  Right Lower Extremity Brace: (L surgical shoe)  Position Activity Restriction  Other position/activity restrictions: Strict NWB following sx: amputation L hallux    Subjective   General  Chart Reviewed: Yes  Patient assessed for rehabilitation services?: Yes  Family / Caregiver Present: No    Social/Functional History  Social/Functional History  Lives With: Alone  Type of Home: House  Home Layout: One level  Home Access: Stairs to enter with rails  Entrance Stairs - Number of Steps: 2  Entrance Stairs - Rails: Both  Bathroom Shower/Tub: Tub/Shower unit  Bathroom Toilet: Handicap height  Home Equipment: (knee scooter)  ADL Assistance: Independent  Homemaking Assistance: Independent  Homemaking Responsibilities: Yes  Ambulation Assistance: Independent  Transfer Assistance: Independent  Active : Yes  Mode of Transportation: Car  Occupation: Full time employment  Type of occupation:   Leisure & Hobbies: reading       Objective   Vision: Impaired  Vision Exceptions: Wears glasses for reading  Hearing: Within functional limits    Orientation  Overall Orientation Status: Within Normal Limits  Observation/Palpation  Posture: Fair  Observation: L Foot is wrapped, IV. Pt 6'4\" 361#. new orders for strict NWB  Balance  Sitting Balance: Independent  Standing Balance: Moderate assistance(min A-mod A for short duration standing NWB at RW.  Pt is unable to maintain L LE off Plan  Times per week: 4-5x/week  Current Treatment Recommendations: Safety Education & Training, Self-Care / ADL, Patient/Caregiver Education & Training, Strengthening, Balance Training, Functional Mobility Training, Endurance Training, Positioning, Equipment Evaluation, Education, & procurement      Goals  Short term goals  Time Frame for Short term goals: 4-5x/week, 1-2x/day  Short term goal 1: demo CGA with functional mob short distance for ADL completion with good adherance to NWB  Short term goal 2: demo CGA with toileting routine with approp DME and good safety  Short term goal 3: demo and verb good understanding of fall prevention techs, NWB techs, and possible equip needs  Patient Goals   Patient goals : to go home       Therapy Time   Individual Concurrent Group Co-treatment   Time In 1500         Time Out 1517(+8 min chart review)         Minutes 17           Patient would benefit from SNF for continued occupational therapy to increase independence with  ADL of bathing, dressing, toileting and grooming. Writer recommending SNF placement for for activity tolerance and strength which will increase independence with ADL's coordinated with bed mobility and chair transfers. Continued skilled OT services to address decreased safety awareness with ADL and IADL tasks and for education and increased independence with DME and AE for fall prevention and ec/ws techniques prior to d/c home.        Temitope Olguin, OT

## 2020-10-23 NOTE — PLAN OF CARE
Problem: Falls - Risk of:  Goal: Will remain free from falls  Description: Will remain free from falls  10/23/2020 0736 by Sumeet Pelaez RN  Outcome: Ongoing  10/23/2020 0332 by Erica Bustillos RN  Outcome: Ongoing  Goal: Absence of physical injury  Description: Absence of physical injury  10/23/2020 0736 by Sumeet Pelaez RN  Outcome: Ongoing  10/23/2020 0332 by Erica Bustillos RN  Outcome: Ongoing     Problem: Infection:  Goal: Will remain free from infection  Description: Will remain free from infection  10/23/2020 0736 by Sumeet Pelaez RN  Outcome: Ongoing  10/23/2020 0332 by Erica Bustillos RN  Outcome: Ongoing     Problem: Safety:  Goal: Free from accidental physical injury  Description: Free from accidental physical injury  10/23/2020 0736 by Sumeet Pelaez RN  Outcome: Ongoing  10/23/2020 0332 by Erica Bustillos RN  Outcome: Ongoing  Goal: Free from intentional harm  Description: Free from intentional harm  10/23/2020 0736 by Sumeet Pelaez RN  Outcome: Ongoing  10/23/2020 0332 by Erica Bustillos RN  Outcome: Ongoing     Problem: Daily Care:  Goal: Daily care needs are met  Description: Daily care needs are met  10/23/2020 0736 by Sumeet Pelaez RN  Outcome: Ongoing  10/23/2020 0332 by Erica Bustillos RN  Outcome: Ongoing     Problem: Pain:  Goal: Patient's pain/discomfort is manageable  Description: Patient's pain/discomfort is manageable  10/23/2020 0736 by Sumeet Pelaez RN  Outcome: Ongoing  10/23/2020 0332 by Erica Bustillos RN  Outcome: Ongoing  Goal: Pain level will decrease  Description: Pain level will decrease  10/23/2020 0736 by Sumeet Pelaez RN  Outcome: Ongoing  10/23/2020 0332 by Erica Bustillos RN  Outcome: Ongoing  Goal: Control of acute pain  Description: Control of acute pain  10/23/2020 0736 by Sumeet Pelaez RN  Outcome: Ongoing  10/23/2020 0332 by Erica Bustillos RN  Outcome: Ongoing  Goal: Control of chronic pain  Description: Control of chronic pain  10/23/2020 0736 by Millicent Kearns RN  Outcome: Ongoing  10/23/2020 0332 by John Segundo RN  Outcome: Ongoing     Problem: Skin Integrity:  Goal: Skin integrity will stabilize  Description: Skin integrity will stabilize  10/23/2020 0736 by Millicent Kearns RN  Outcome: Ongoing  10/23/2020 0332 by John Segundo RN  Outcome: Ongoing     Problem: Discharge Planning:  Goal: Patients continuum of care needs are met  Description: Patients continuum of care needs are met  10/23/2020 0736 by Millicent Kearns RN  Outcome: Ongoing  10/23/2020 0332 by John Segundo RN  Outcome: Ongoing     Problem: Nutrition  Goal: Optimal nutrition therapy  10/23/2020 0736 by Millicent Kearns RN  Outcome: Ongoing  10/23/2020 0332 by John Segundo RN  Outcome: Ongoing     Problem: Discharge Planning:  Goal: Discharged to appropriate level of care  Description: Discharged to appropriate level of care  10/23/2020 0736 by Millicent Kearns RN  Outcome: Ongoing  10/23/2020 0332 by John Segundo RN  Outcome: Ongoing     Problem: Serum Glucose Level - Abnormal:  Goal: Ability to maintain appropriate glucose levels will improve  Description: Ability to maintain appropriate glucose levels will improve  10/23/2020 0736 by Millicent Kearns RN  Outcome: Ongoing  10/23/2020 0332 by John Segundo RN  Outcome: Ongoing     Problem: Sensory Perception - Impaired:  Goal: Ability to maintain a stable neurologic state will improve  Description: Ability to maintain a stable neurologic state will improve  10/23/2020 0736 by Millicent Kearns RN  Outcome: Ongoing  10/23/2020 0332 by John Segundo RN  Outcome: Ongoing

## 2020-10-23 NOTE — PROGRESS NOTES
[facility]  Office: [address], DO, Isael Radha, DO, Russelalek Barney, DO, Jensen Macias Blood, DO, Jasmin Ghosh MD, Alex Iraheta MD, Tucker Duvall MD, Harjinder Koehler MD, Enrique Slade MD, Wanda Gaspar MD, Bob Bustillos MD, Bee Pulido MD, Shania Sneed MD, Lokesh Luis, DO, Nickolas Gunn MD, Le Nesbitt MD, Stephania Escobedo, DO, Taqueria Trinidad MD,  Cori Wheeler, DO, Yaneth Jackson MD, Andrés Onofre MD, Rigoberto Art, [facility], [facility], CNP, Jayda Castillo, CNP, Carolina Onofre, CNS, Jania Mata, CNP, Krissy An, CNP, Dave Ramirez, CNP, Miko Boles, CNP, To Seaman, CNP, Catheryn Leventhal, PA-C, Nely Orlando, [facility], Juan Wood, CNP, Roosevelt Mcdaniel, CNP, Michelle Sanford, CNP, Gigi Lu, CNP, Deandre Mckeon [facility]    Progress Note    10/23/2020    12:00 PM    Name:   Bethany Coronado  MRN:     [id number]     [place]:      [de-identified]   Room:   2021/2021-01   Day:  4  Admit Date:  10/19/2020  5:42 PM    PCP:   David Schneider MD  Code Status:  Full Code    Subjective:     C/C:   Chief Complaint   Patient presents with    Cellulitis     left foot     Interval History Status: not changed. Had foot surgery 10/21  Foot doesn't hurt but ankle swollen and sore   No cp/sob/n/v    Brief History:     Per my REGINA:  \"Shad Barros is a 63 y.o. Non-/non  male who presents with Cellulitis (left foot)   and is admitted to the hospital for the management of Non-healing ulcer of foot, left, with unspecified severity ([facility] [address]    The patient reports to the hospital with complaint of worsening left foot wound. Patient reports that he had a chronic left foot wound was under the care of Dr. Jessica Vazquez with podiatry. He also endorses a fracture to his left great toe that is not new.  He states that his wound had previously closed and that approximately 7-10 days ago he noticed increased redness, swelling and an open area to his left foot. He was prescribed ciprofloxacin 1 week ago and did not see any improvement. He was seen by Dr. Tracie Dorantes with podiatry who encouraged him to come to the ER for further evaluation. The patient endorses neuropathy to his bilateral feet, he does report some pain to his left foot that he describes as dull, intermittent and moderate in intensity. It is aggravated with movement. No definitive alleviating factors. He denies fever, chills, nausea or vomiting. No additional symptomology. He has past medical history that includes type 2 diabetes, CKD and RAÚL with nightly BiPAP use.     Creatinine 1.51, WBC 11.1, glucose 280, .1, sed rate 86.\"    Review of Systems:     Constitutional:  negative for chills, fevers, sweats  Respiratory:  negative for cough, dyspnea on exertion, shortness of breath, wheezing  Cardiovascular:  negative for chest pain, chest pressure/discomfort, palpitations  Gastrointestinal:  negative for abdominal pain, constipation, diarrhea, nausea, vomiting  Neurological:  negative for dizziness, headache    Medications:      Allergies:  No Known Allergies    Current Meds:   Scheduled Meds:    insulin glargine  40 Units Subcutaneous BID    insulin lispro  0-12 Units Subcutaneous TID WC    insulin lispro  0-6 Units Subcutaneous Nightly    allopurinol  300 mg Oral Daily    gabapentin  300 mg Oral BID    magnesium oxide  400 mg Oral Daily    therapeutic multivitamin-minerals  1 tablet Oral Daily    sodium chloride flush  10 mL Intravenous 2 times per day    cefepime  2 g Intravenous Q12H    linezolid  600 mg Intravenous Q12H    heparin (porcine)  5,000 Units Subcutaneous 3 times per day     Continuous Infusions:    dextrose       PRN Meds: oxyCODONE **OR** oxyCODONE, LORazepam, melatonin, sodium chloride flush, potassium chloride **OR** potassium alternative oral replacement **OR** potassium chloride, magnesium sulfate, acetaminophen **OR** acetaminophen, polyethylene glycol, promethazine **OR** ondansetron, nicotine, glucose, dextrose, glucagon (rDNA), dextrose, morphine    Data:     Past Medical History:   has a past medical history of Chronic kidney disease, Diabetes mellitus ([facility] [address] Diabetic neuropathy ([facility] History of kidney stones, and Sleep apnea. Social History:   reports that he has never smoked. He has never used smokeless tobacco. He reports current alcohol use. He reports that he does not use drugs. Family History:   Family History   Problem Relation Age of Onset    Heart Disease Mother     Heart Disease Father        Vitals:  /61   Pulse 72   Temp 98.2 °F (36.8 °C) (Oral)   Resp 18   Ht 6' 4\" (1.93 m)   Wt (!) 361 lb (163.7 kg)   SpO2 93%   BMI 43.94 kg/m²   Temp (24hrs), Av.5 °F (36.9 °C), Min:98.2 °F (36.8 °C), Max:98.9 °F (37.2 °C)    Recent Labs     10/22/20  1656 10/22/20  2028 10/23/20  0619 10/23/20  1133   POCGLU 264* 319* 200* 256*       I/O (24Hr):     Intake/Output Summary (Last 24 hours) at 10/23/2020 1200  Last data filed at 10/23/2020 1051  Gross per 24 hour   Intake 1371 ml   Output 1350 ml   Net 21 ml       Labs:  Hematology:  Recent Labs     10/21/20  0453 10/23/20  0540   WBC 9.0 7.8   RBC 4.18* 4.36   HGB 12.5* 12.8*   HCT 38.6* 40.0*   MCV 92.3 91.7   MCH 29.9 29.4   MCHC 32.4 32.0   RDW 11.9 11.9    321   MPV 8.8 9.0   .9*  --      Chemistry:  Recent Labs     10/21/20  0453 10/23/20  0540    135   K 4.9 4.3    100   CO2 27 27   GLUCOSE 212* 211*   BUN 21 19   CREATININE 1.44* 1.31*   ANIONGAP 7* 8*   LABGLOM 50* 56*   GFRAA >60 >60   CALCIUM 9.5 9.7     Recent Labs     10/22/20  0618 10/22/20  1111 10/22/20  1656 10/22/20  2028 10/23/20  0619 10/23/20  1133   POCGLU 221* 312* 264* 319* 200* 256*     ABG:No results found for: POCPH, PHART, PH, POCPCO2, KDW8UDL, PCO2, POCPO2, PO2ART, PO2, POCHCO3, ZTS7RFJ, HCO3, NBEA, PBEA, BEART, BE, THGBART, THB, TIL2QPG, NLWK6LWK, X5MJYYOI, O2SAT, FIO2  Lab Results   Component Value Date/Time    SPECIAL NOT REPORTED 10/21/2020 10:16 AM     Lab Results   Component Value Date/Time    CULTURE PRESUMPTIVE ID: GROUP D ENTEROCOCCUS LIGHT GROWTH 10/21/2020 10:16 AM    CULTURE NORMAL CHHAYA 10/21/2020 10:16 AM       Radiology:  Mirtha Santos Foot Left (min 3 Views)    Result Date: 10/19/2020  Abnormality of the base of the 1st proximal phalanx. This appearance could be caused by chronic infectious process or fracture. MRI may be helpful for better characterization     Mri Foot Left Wo Contrast    Result Date: 10/20/2020  1. Plantar ulceration at the level of the 1st MTP joint with large focal underlying soft tissue abnormality measuring 4.9 x 2.1 x 4.3 cm. Phlegmonous change is felt most likely. Infectious adventitial bursitis is an alternate consideration. 2. Flexor hallucis longus tenosynovitis and partial thickness tearing. The tendon sheath appears to communicate with the plantar soft tissue collection raising concern for infectious tenosynovitis. Additionally there is tenosynovitis of the proximal flexor digitorum longus tendon sheaths which may also be infectious. 3. Joint effusion/synovitis at the 1st MTP joint with associated marrow signal abnormality. Septic arthritis and osteomyelitis should be considered. 4. Marrow signal abnormality of the 1st distal phalanx may reflect early osteomyelitis.   Phlegmonous change appears to extend along the plantar aspect of the great toe.     ekg today shows inferior q waves III, avf and poor r wave progression anteriorly, suggestive of possible old mi-which he denies; the ekg is basically same as from jan 2016 ekg     Physical Examination:        General appearance:  alert, cooperative and no distress  Mental Status:  oriented to person, place and time and normal affect  Lungs:  clear to auscultation bilaterally, normal effort  Heart:  regular rate and rhythm, no murmur  Abdomen:  soft, nontender, nondistended, normal bowel sounds, no masses, hepatomegaly, splenomegaly; obese  Extremities:  no edema, redness, tenderness in the calves  Skin:  See pics right foot    Assessment:        Hospital Problems           Last Modified POA    * (Principal) Non-healing ulcer of foot, left, with unspecified severity ([address] 10/19/2020 Yes    Type 2 diabetes mellitus with hyperglycemia, with long-term current use of insulin ([address] 10/19/2020 Yes    Diabetic autonomic neuropathy associated with type 2 diabetes mellitus ([address] 10/19/2020 Yes    Obstructive sleep apnea 10/19/2020 Yes    Class 3 severe obesity due to excess calories with serious comorbidity and body mass index (BMI) of 45.0 to 49.9 in adult [facility]) 10/19/2020 Yes    CKD (chronic kidney disease) stage 3, GFR 30-59 ml/min 10/20/2020 Yes    Sepsis ([address] 10/22/2020 Yes          Plan:        Increased lantus to try and improve glucose levels to promote healing  Cont home cpap  Awaiting final cultures to streamline antibiotics  Dc planning to snf once final cultures available and arrangements made      Alejandro King Blood, DO  10/23/2020  12:00 PM

## 2020-10-23 NOTE — CARE COORDINATION
Social work: Noted PT/OT recommendation of SNF. He gave choices to Amber/lila planner of:  1. [facility]  2. [facility]  3. [facility]  4. [facility]    Ref[facility] faxed to [facility] and [facility] [place] [place] [facility] [place].. MAAME lyons.

## 2020-10-23 NOTE — PROGRESS NOTES
Progress Note  Podiatric Medicine and Surgery     Subjective     CC: Left foot wound    POD #2 s/p left hallux amp, sesamoidectomy, and 1st metatarsal bone bx (DOS: 10/21/20)  Patient seen and examined at bedside. No acute events overnight. Afebrile, VS stable  Pain controlled overnight, residual pain in the medial ankle  Has not worked with PT/OT post-operatively    HPI :  Robby Chao is a 63 y.o. male seen at [facility] for left foot wound. Patient notes that he has had a left foot wound for the last year that has been treated by podiatrist Dr. Kenneth Dolan. Has a surgical shoe for the left foot and has been performing daily dressing changes and has been soaking his foot in saline. Denies any new falls or trauma. Relates that he has had a history of a fracture in his left great toe for quite some time and that is not new. Notes that he has very little sensation to feet however his great toe has a constant sharp localized pain to his great toe joint. Has been on 1 week course of Cipro and notes that since Monday left foot has become more red and swollen and painful with increasing drainage. Patient was seen by Dr. Tomas Luo today who recommended reporting to ED for further evaluation. ROS: Denies N/V/F/C/SOB/CP. Otherwise negative except at stated in the HPI.      Medications:  Scheduled Meds:   insulin glargine  40 Units Subcutaneous BID    insulin lispro  0-12 Units Subcutaneous TID WC    insulin lispro  0-6 Units Subcutaneous Nightly    allopurinol  300 mg Oral Daily    gabapentin  300 mg Oral BID    magnesium oxide  400 mg Oral Daily    therapeutic multivitamin-minerals  1 tablet Oral Daily    sodium chloride flush  10 mL Intravenous 2 times per day    cefepime  2 g Intravenous Q12H    linezolid  600 mg Intravenous Q12H    heparin (porcine)  5,000 Units Subcutaneous 3 times per day       Continuous Infusions:   dextrose         PRN Meds:oxyCODONE **OR** oxyCODONE, LORazepam, melatonin, sodium chloride flush, potassium chloride **OR** potassium alternative oral replacement **OR** potassium chloride, magnesium sulfate, acetaminophen **OR** acetaminophen, polyethylene glycol, promethazine **OR** ondansetron, nicotine, glucose, dextrose, glucagon (rDNA), dextrose, morphine    Objective     Vitals:  Patient Vitals for the past 8 hrs:   BP Temp Temp src Pulse Resp SpO2 Weight   10/23/20 0728 123/61 98.2 °F (36.8 °C) Oral 72 18 93 % --   10/23/20 0559 -- -- -- -- -- -- (!) 361 lb (163.7 kg)     Average, Min, and Max for last 24 hours Vitals:  TEMPERATURE:  Temp  Av.5 °F (36.9 °C)  Min: 98.2 °F (36.8 °C)  Max: 98.9 °F (37.2 °C)    RESPIRATIONS RANGE: Resp  Av.6  Min: 16  Max: 18    PULSE RANGE: Pulse  Av.4  Min: 72  Max: 83    BLOOD PRESSURE RANGE:  Systolic (97JGN), IDN:099 , Min:116 , VND:215   ; Diastolic (90VCW), DPM:81, Min:53, Max:68      PULSE OXIMETRY RANGE: SpO2  Av %  Min: 92 %  Max: 94 %    I/O last 3 completed shifts: In: 1071 [P.O.:400; I.V.:621; IV Piggyback:50]  Out: 1275 [BBHVX:]    CBC:  Recent Labs     10/21/20  0453 10/23/20  0540   WBC 9.0 7.8   HGB 12.5* 12.8*   HCT 38.6* 40.0*    321   .9*  --         BMP:  Recent Labs     10/21/20  0453 10/23/20  0540    135   K 4.9 4.3    100   CO2 27 27   BUN 21 19   CREATININE 1.44* 1.31*   GLUCOSE 212* 211*   CALCIUM 9.5 9.7        Coags:  No results for input(s): APTT, PROT, INR in the last 72 hours. Lab Results   Component Value Date    SEDRATE 86 (H) 10/19/2020     Recent Labs     10/21/20  0453   .9*       Lower Extremity Physical Exam:   Vascular: DP and PT pulses are palpable. CFT <3 seconds to all digits. Hair growth is absent to the level of the digits. Edema to the foot and ankle appreciated.     Neuro: Saph/sural/SP/DP/plantar sensation absent to light touch.     Musculoskeletal: Muscle strength is 5/5 to all lower extremity muscle groups.   Gross deformity present, s/p left hallux amputation. Mild tenderness noted to amputation site as well as the medial ankle. Negative pain on calf squeeze bilaterally. All muscle compartments soft compressible.     Dermatologic: Incision of the left hallux amputation extending from the dorsal foot to the plantar foot. Sutures intact with skin edges well coapted. with distal opening. Macerated tissue noted to the distal wound edge. Wound probes deep to bone approximately 2.5 cm and deep plantarly approximately 5.0cm. Scant sanguinous drainage appreciated with no malodor. Periincisional edema and erythema with an increased warmth noted consistent with postop state. Clinical Images:         Imaging:   VL LOWER EXTREMITY ARTERIAL SEGMENTAL PRESSURES W PPG   Final Result      MRI FOOT LEFT WO CONTRAST   Final Result   1. Plantar ulceration at the level of the 1st MTP joint with large focal   underlying soft tissue abnormality measuring 4.9 x 2.1 x 4.3 cm. Phlegmonous   change is felt most likely. Infectious adventitial bursitis is an alternate   consideration. 2. Flexor hallucis longus tenosynovitis and partial thickness tearing. The   tendon sheath appears to communicate with the plantar soft tissue collection   raising concern for infectious tenosynovitis. Additionally there is   tenosynovitis of the proximal flexor digitorum longus tendon sheaths which   may also be infectious. 3. Joint effusion/synovitis at the 1st MTP joint with associated marrow   signal abnormality. Septic arthritis and osteomyelitis should be considered. 4. Marrow signal abnormality of the 1st distal phalanx may reflect early   osteomyelitis. Phlegmonous change appears to extend along the plantar aspect   of the great toe. XR FOOT LEFT (MIN 3 VIEWS)   Final Result   Abnormality of the base of the 1st proximal phalanx. This appearance could   be caused by chronic infectious process or fracture.   MRI may be helpful for   better characterization Cultures:   Left foot swab (10/19)- GPC, rare GNR, E faecalis  Left first metatarsal bone (10/21)- Coag negative staph  Left sesamoid (10/21)- Few GPC in pairs and clusters  Bone biopsy  1. Left hallux- Acute on chronic OM with cellulitis and abscess  2. Left 1st metatarsal- Focal acute OM  3. Left sesamoid- Acute on chronic OM    Assessment   Bethany Coronado is a 63 y.o. male with status post amputation and sesamoidectomy  1. S/p left hallux amputation with tibial and fibular sesamoidectomy (DOS: 10/21/20)  2. Abscess left foot- Resolved  3. TARA  4. Cellulitis left foot- Improving  5. Diabetic foot ulcer down to bone left foot  6. DM2 with peripheral neuropathy    Principal Problem:    Non-healing ulcer of foot, left, with unspecified severity ([address]  Active Problems:    Type 2 diabetes mellitus with hyperglycemia, with long-term current use of insulin (HCC)    Diabetic autonomic neuropathy associated with type 2 diabetes mellitus ([address]    Obstructive sleep apnea    Class 3 severe obesity due to excess calories with serious comorbidity and body mass index (BMI) of 45.0 to 49.9 in adult [facility])    CKD (chronic kidney disease) stage 3, GFR 30-59 ml/min    Sepsis ([address]  Resolved Problems:    Diabetic ulcer of left foot associated with type 2 diabetes mellitus ([address]    Cellulitis of left lower extremity       Plan     · Patient examined and evaluated at bedside   · Treatment options discussed in detail with the patient. · ID on board: Currently on Zyvox/cefepime. Will await final antibiotic recommendations  · Will await PT/OT recommendations for discharge  · Left foot wound painted with betadine and packed with 1/4 inch iodoform, dressed with Adaptic, 4 x 4, ABD, Kerlix, Ace. · Patient fine for discharge from podiatry standpoint once final abx recommendations are made by ID and cleared by PT/OT. Will recommend daily dressing changes by [facility] with 1/4 inch iodoform, adaptic, 4x4 fluffs, kerlix, and Ace.  Strict nonweight bearing to the LLE. Patient to follow up with Dr. Mary Tamez within 1 week of discharge. · Discussed with Dr. Yen Tidwell.     Jigar Khan DPM   Podiatric Medicine & Surgery   10/23/2020 at 7:50 AM

## 2020-10-23 NOTE — PROGRESS NOTES
Physical Therapy    Facility/Department: [facility] MED SURG  RE-EVALUATION    NAME: Russell Collier  : 1960  MRN: [id number]    Date of Service: 10/23/2020    Discharge Recommendations:  Subacute/Skilled Nursing Facility        Assessment   Body structures, Functions, Activity limitations: Decreased functional mobility ; Decreased safe awareness; Increased pain  Assessment: Skilled therapy needed due to increased assist with transfers and gait, patient is increased fall risk due to L LE NWB and patient unable to maintain NWB due to decreased balance. Recommend D/C to SNF at this time due to being a fall risk and uanble to maintain NWB. Prognosis: Good  Decision Making: Medium Complexity  PT Education: Goals;PT Role;Plan of Care;Gait Training  Patient Education: discussed discharge plans and patient feels SNF would be safest for him at this time  Activity Tolerance  Activity Tolerance: Patient limited by fatigue;Patient limited by pain  Activity Tolerance: gait distance limited by unable to maintain L LE NWB       Patient Diagnosis(es): The primary encounter diagnosis was Diabetic ulcer of left foot associated with type 2 diabetes mellitus, with other ulcer severity, unspecified part of foot ([address] Diagnoses of Cellulitis of left lower extremity and Acute post-operative pain were also pertinent to this visit. has a past medical history of Chronic kidney disease, Diabetes mellitus ([address] Diabetic neuropathy ([address] History of kidney stones, and Sleep apnea. has a past surgical history that includes Lithotripsy (); Appendectomy; Nasal septum surgery; Foot Debridement (Left, 2016); skin split graft (Left, 2016); Foot surgery (Left, 2019); Foot Debridement (Left, 2019); Foot surgery (Left, 2019); Foot surgery (Left, 2019); Foot Debridement (Left, 3/12/2019); other surgical history (Left, 2019); Skin graft (Left, 4/3/2019); Foot Debridement (Left, 2019);  Skin graft (Left, 4/18/2019); Skin graft (Left, 5/7/2019); and Toe amputation (Left, 10/21/2020). Restrictions  Restrictions/Precautions  Restrictions/Precautions: Weight Bearing, Fall Risk  Required Braces or Orthoses?: No  Lower Extremity Weight Bearing Restrictions  Left Lower Extremity Weight Bearing: Non Weight Bearing  Required Braces or Orthoses  Right Lower Extremity Brace: (L surgical shoe)  Position Activity Restriction  Other position/activity restrictions: L LE WBAT with surgical shoe on  Vision/Hearing        Subjective  General  Family / Caregiver Present: No  General Comment  Comments: Patient had L hallux amputation 10/21 and new orders sent for PT due to being L NWB.   Subjective  Subjective: Patient frustrated with still being in hospital, originally stated he was going to a friend's house at D/C that does not have steps, following PT patient asked about going to rehab due to being unable to maintain WB status  Pain Screening  Patient Currently in Pain: Yes          Orientation     Social/Functional History  Social/Functional History  Lives With: Alone  Type of Home: House  Home Layout: One level  Home Access: Stairs to enter with rails  Entrance Stairs - Number of Steps: 2  Entrance Stairs - Rails: Both  Bathroom Shower/Tub: Tub/Shower unit  Bathroom Toilet: Handicap height  Home Equipment: (knee scooter)  ADL Assistance: Independent  Homemaking Assistance: Independent  Homemaking Responsibilities: Yes  Ambulation Assistance: Independent  Transfer Assistance: Independent  Active : Yes  Mode of Transportation: Car  Occupation: Full time employment  Type of occupation:   Leisure & Hobbies: reading  Cognition        Objective     Observation/Palpation  Posture: Fair  Observation: L Foot is wrapped, IV    AROM RLE (degrees)  RLE AROM: WFL  AROM LLE (degrees)  LLE AROM : WFL  LLE General AROM: foot wrapped, not assessed  Strength RLE  Strength RLE: WFL  Strength LLE  Strength LLE: Exception  Comment: ankle not tested due to recent amputation of L hallux  Motor Control  Gross Motor?: WFL     Bed mobility  Supine to Sit: Independent  Comment: patient left sitting edge of bed  Transfers  Sit to Stand: Contact guard assistance  Stand to sit: Contact guard assistance  Comment: cues for proper technique, difficulty keeping L NWB  Ambulation  Ambulation?: Yes  WB Status: L LE NWB  Ambulation 1  Surface: level tile  Device: Rolling Walker  Assistance: Minimal assistance  Quality of Gait: moves fast, decreased safety awareness  Gait Deviations: Decreased step length  Distance: 10 feet  Comments: patient unable to maintain L LE NWB, demonstrates more WBAT, so had patient stop ambulation. Patient has OA R knee and has difficulty hopping due to pain.      Balance  Sitting - Static: Good  Sitting - Dynamic: Good  Standing - Static: Fair  Standing - Dynamic: [address]  Times per week: 1-2x/day for 5-6 days/week  Current Treatment Recommendations: Gait Training, Transfer Training, Endurance Training  Safety Devices  Type of devices: Left in bed, Nurse notified, Gait belt, Call light within reach      AM-PAC Score  AM-PAC Inpatient Mobility Raw Score : 19 (10/23/20 1053)  AM-PAC Inpatient T-Scale Score : 45.44 (10/23/20 1053)  Mobility Inpatient CMS 0-100% Score: 41.77 (10/23/20 1053)  Mobility Inpatient CMS G-Code Modifier : CK (10/23/20 1053)          Goals  Short term goals  Time Frame for Short term goals: 12 visits  Short term goal 1: Independent sit<>stand while maintaining L NWB  Short term goal 2: Ambulate 40 feet with roll walker L LE NWB       Therapy Time   Individual Concurrent Group Co-treatment   Time In 1020         Time Out 1035(additional 10 minutes chart review)         Minutes 15                 Pancho Schneider, PT

## 2020-10-23 NOTE — PLAN OF CARE
Problem: Falls - Risk of:  Goal: Will remain free from falls  Description: Will remain free from falls  Outcome: Ongoing  Goal: Absence of physical injury  Description: Absence of physical injury  Outcome: Ongoing     Problem: Infection:  Goal: Will remain free from infection  Description: Will remain free from infection  Outcome: Ongoing     Problem: Safety:  Goal: Free from accidental physical injury  Description: Free from accidental physical injury  Outcome: Ongoing  Goal: Free from intentional harm  Description: Free from intentional harm  Outcome: Ongoing     Problem: Daily Care:  Goal: Daily care needs are met  Description: Daily care needs are met  Outcome: Ongoing     Problem: Pain:  Goal: Patient's pain/discomfort is manageable  Description: Patient's pain/discomfort is manageable  Outcome: Ongoing  Goal: Pain level will decrease  Description: Pain level will decrease  Outcome: Ongoing  Goal: Control of acute pain  Description: Control of acute pain  Outcome: Ongoing  Goal: Control of chronic pain  Description: Control of chronic pain  Outcome: Ongoing     Problem: Skin Integrity:  Goal: Skin integrity will stabilize  Description: Skin integrity will stabilize  Outcome: Ongoing     Problem: Discharge Planning:  Goal: Patients continuum of care needs are met  Description: Patients continuum of care needs are met  Outcome: Ongoing     Problem: Nutrition  Goal: Optimal nutrition therapy  Outcome: Ongoing     Problem: Discharge Planning:  Goal: Discharged to appropriate level of care  Description: Discharged to appropriate level of care  Outcome: Ongoing     Problem: Serum Glucose Level - Abnormal:  Goal: Ability to maintain appropriate glucose levels will improve  Description: Ability to maintain appropriate glucose levels will improve  Outcome: Ongoing     Problem: Sensory Perception - Impaired:  Goal: Ability to maintain a stable neurologic state will improve  Description: Ability to maintain a stable neurologic state will improve  Outcome: Ongoing

## 2020-10-23 NOTE — CARE COORDINATION
Therapy recommending SNF due to NWB to lt foot. [facility] but since cant bear wt does not feel he can manage and doesn't feel it would be safe at first until able to bear some wt. SNF list given and pt chose 3 facilities. Fernanda Ann informed and will send referral.  YELENA initiated.

## 2020-10-24 LAB
ABSOLUTE EOS #: 0.39 K/UL (ref 0–0.44)
ABSOLUTE IMMATURE GRANULOCYTE: 0.13 K/UL (ref 0–0.3)
ABSOLUTE LYMPH #: 2.02 K/UL (ref 1.1–3.7)
ABSOLUTE MONO #: 0.74 K/UL (ref 0.1–1.2)
ANION GAP SERPL CALCULATED.3IONS-SCNC: 9 MMOL/L (ref 9–17)
BASOPHILS # BLD: 1 % (ref 0–2)
BASOPHILS ABSOLUTE: 0.07 K/UL (ref 0–0.2)
BUN BLDV-MCNC: 17 MG/DL (ref 8–23)
BUN/CREAT BLD: 13 (ref 9–20)
CALCIUM SERPL-MCNC: 9.5 MG/DL (ref 8.6–10.4)
CHLORIDE BLD-SCNC: 99 MMOL/L (ref 98–107)
CO2: 28 MMOL/L (ref 20–31)
CREAT SERPL-MCNC: 1.3 MG/DL (ref 0.7–1.2)
DIFFERENTIAL TYPE: ABNORMAL
EOSINOPHILS RELATIVE PERCENT: 5 % (ref 1–4)
GFR AFRICAN AMERICAN: >60 ML/MIN
GFR NON-AFRICAN AMERICAN: 56 ML/MIN
GFR SERPL CREATININE-BSD FRML MDRD: ABNORMAL ML/MIN/{1.73_M2}
GFR SERPL CREATININE-BSD FRML MDRD: ABNORMAL ML/MIN/{1.73_M2}
GLUCOSE BLD-MCNC: 145 MG/DL (ref 75–110)
GLUCOSE BLD-MCNC: 156 MG/DL (ref 70–99)
GLUCOSE BLD-MCNC: 205 MG/DL (ref 75–110)
GLUCOSE BLD-MCNC: 211 MG/DL (ref 75–110)
GLUCOSE BLD-MCNC: 219 MG/DL (ref 75–110)
GLUCOSE BLD-MCNC: 239 MG/DL (ref 75–110)
HCT VFR BLD CALC: 39 % (ref 40.7–50.3)
HEMOGLOBIN: 12.9 G/DL (ref 13–17)
IMMATURE GRANULOCYTES: 2 %
LYMPHOCYTES # BLD: 25 % (ref 24–43)
MCH RBC QN AUTO: 30.3 PG (ref 25.2–33.5)
MCHC RBC AUTO-ENTMCNC: 33.1 G/DL (ref 28.4–34.8)
MCV RBC AUTO: 91.5 FL (ref 82.6–102.9)
MONOCYTES # BLD: 9 % (ref 3–12)
NRBC AUTOMATED: 0 PER 100 WBC
PDW BLD-RTO: 11.9 % (ref 11.8–14.4)
PLATELET # BLD: 321 K/UL (ref 138–453)
PLATELET ESTIMATE: ABNORMAL
PMV BLD AUTO: 8.6 FL (ref 8.1–13.5)
POTASSIUM SERPL-SCNC: 4.1 MMOL/L (ref 3.7–5.3)
RBC # BLD: 4.26 M/UL (ref 4.21–5.77)
RBC # BLD: ABNORMAL 10*6/UL
SEG NEUTROPHILS: 58 % (ref 36–65)
SEGMENTED NEUTROPHILS ABSOLUTE COUNT: 4.64 K/UL (ref 1.5–8.1)
SODIUM BLD-SCNC: 136 MMOL/L (ref 135–144)
WBC # BLD: 8 K/UL (ref 3.5–11.3)
WBC # BLD: ABNORMAL 10*3/UL

## 2020-10-24 PROCEDURE — 6370000000 HC RX 637 (ALT 250 FOR IP): Performed by: STUDENT IN AN ORGANIZED HEALTH CARE EDUCATION/TRAINING PROGRAM

## 2020-10-24 PROCEDURE — 6360000002 HC RX W HCPCS: Performed by: INTERNAL MEDICINE

## 2020-10-24 PROCEDURE — 6370000000 HC RX 637 (ALT 250 FOR IP): Performed by: INTERNAL MEDICINE

## 2020-10-24 PROCEDURE — 97535 SELF CARE MNGMENT TRAINING: CPT

## 2020-10-24 PROCEDURE — 2580000003 HC RX 258: Performed by: INTERNAL MEDICINE

## 2020-10-24 PROCEDURE — 97116 GAIT TRAINING THERAPY: CPT

## 2020-10-24 PROCEDURE — 1200000000 HC SEMI PRIVATE

## 2020-10-24 PROCEDURE — 97530 THERAPEUTIC ACTIVITIES: CPT

## 2020-10-24 PROCEDURE — 6360000002 HC RX W HCPCS: Performed by: STUDENT IN AN ORGANIZED HEALTH CARE EDUCATION/TRAINING PROGRAM

## 2020-10-24 PROCEDURE — 36415 COLL VENOUS BLD VENIPUNCTURE: CPT

## 2020-10-24 PROCEDURE — 82947 ASSAY GLUCOSE BLOOD QUANT: CPT

## 2020-10-24 PROCEDURE — 2580000003 HC RX 258: Performed by: STUDENT IN AN ORGANIZED HEALTH CARE EDUCATION/TRAINING PROGRAM

## 2020-10-24 PROCEDURE — 80048 BASIC METABOLIC PNL TOTAL CA: CPT

## 2020-10-24 PROCEDURE — 99232 SBSQ HOSP IP/OBS MODERATE 35: CPT | Performed by: INTERNAL MEDICINE

## 2020-10-24 PROCEDURE — 85025 COMPLETE CBC W/AUTO DIFF WBC: CPT

## 2020-10-24 RX ADMIN — GABAPENTIN 300 MG: 300 CAPSULE ORAL at 10:11

## 2020-10-24 RX ADMIN — SODIUM CHLORIDE 3 G: 900 INJECTION INTRAVENOUS at 03:20

## 2020-10-24 RX ADMIN — SODIUM CHLORIDE 3 G: 900 INJECTION INTRAVENOUS at 21:45

## 2020-10-24 RX ADMIN — SODIUM CHLORIDE 3 G: 900 INJECTION INTRAVENOUS at 09:39

## 2020-10-24 RX ADMIN — INSULIN LISPRO 4 UNITS: 100 INJECTION, SOLUTION INTRAVENOUS; SUBCUTANEOUS at 12:33

## 2020-10-24 RX ADMIN — SODIUM CHLORIDE, PRESERVATIVE FREE 10 ML: 5 INJECTION INTRAVENOUS at 20:11

## 2020-10-24 RX ADMIN — ALLOPURINOL 300 MG: 300 TABLET ORAL at 09:38

## 2020-10-24 RX ADMIN — OXYCODONE HYDROCHLORIDE 10 MG: 5 TABLET ORAL at 20:08

## 2020-10-24 RX ADMIN — OXYCODONE HYDROCHLORIDE 10 MG: 5 TABLET ORAL at 03:19

## 2020-10-24 RX ADMIN — HEPARIN SODIUM 5000 UNITS: 5000 INJECTION INTRAVENOUS; SUBCUTANEOUS at 06:06

## 2020-10-24 RX ADMIN — ACETAMINOPHEN 650 MG: 325 TABLET ORAL at 16:25

## 2020-10-24 RX ADMIN — GABAPENTIN 300 MG: 300 CAPSULE ORAL at 21:44

## 2020-10-24 RX ADMIN — INSULIN GLARGINE 40 UNITS: 100 INJECTION, SOLUTION SUBCUTANEOUS at 09:39

## 2020-10-24 RX ADMIN — INSULIN GLARGINE 40 UNITS: 100 INJECTION, SOLUTION SUBCUTANEOUS at 21:45

## 2020-10-24 RX ADMIN — MULTIPLE VITAMINS W/ MINERALS TAB 1 TABLET: TAB at 09:38

## 2020-10-24 RX ADMIN — HEPARIN SODIUM 5000 UNITS: 5000 INJECTION INTRAVENOUS; SUBCUTANEOUS at 21:44

## 2020-10-24 RX ADMIN — SODIUM CHLORIDE 3 G: 900 INJECTION INTRAVENOUS at 15:19

## 2020-10-24 RX ADMIN — OXYCODONE HYDROCHLORIDE 10 MG: 5 TABLET ORAL at 14:23

## 2020-10-24 RX ADMIN — MAGNESIUM GLUCONATE 500 MG ORAL TABLET 400 MG: 500 TABLET ORAL at 09:38

## 2020-10-24 RX ADMIN — OXYCODONE HYDROCHLORIDE 10 MG: 5 TABLET ORAL at 09:44

## 2020-10-24 RX ADMIN — INSULIN LISPRO 4 UNITS: 100 INJECTION, SOLUTION INTRAVENOUS; SUBCUTANEOUS at 16:20

## 2020-10-24 RX ADMIN — INSULIN LISPRO 2 UNITS: 100 INJECTION, SOLUTION INTRAVENOUS; SUBCUTANEOUS at 21:45

## 2020-10-24 RX ADMIN — ACETAMINOPHEN 650 MG: 325 TABLET ORAL at 09:44

## 2020-10-24 RX ADMIN — HEPARIN SODIUM 5000 UNITS: 5000 INJECTION INTRAVENOUS; SUBCUTANEOUS at 15:19

## 2020-10-24 ASSESSMENT — PAIN DESCRIPTION - ORIENTATION
ORIENTATION: LEFT

## 2020-10-24 ASSESSMENT — PAIN SCALES - GENERAL
PAINLEVEL_OUTOF10: 7
PAINLEVEL_OUTOF10: 8
PAINLEVEL_OUTOF10: 3
PAINLEVEL_OUTOF10: 8
PAINLEVEL_OUTOF10: 3
PAINLEVEL_OUTOF10: 3
PAINLEVEL_OUTOF10: 7
PAINLEVEL_OUTOF10: 7

## 2020-10-24 ASSESSMENT — PAIN DESCRIPTION - PAIN TYPE
TYPE: SURGICAL PAIN
TYPE: SURGICAL PAIN
TYPE: ACUTE PAIN
TYPE: SURGICAL PAIN
TYPE: SURGICAL PAIN

## 2020-10-24 ASSESSMENT — PAIN DESCRIPTION - FREQUENCY
FREQUENCY: INTERMITTENT

## 2020-10-24 ASSESSMENT — ENCOUNTER SYMPTOMS
GASTROINTESTINAL NEGATIVE: 1
ALLERGIC/IMMUNOLOGIC NEGATIVE: 1
RESPIRATORY NEGATIVE: 1

## 2020-10-24 ASSESSMENT — PAIN DESCRIPTION - PROGRESSION
CLINICAL_PROGRESSION: NOT CHANGED

## 2020-10-24 ASSESSMENT — PAIN DESCRIPTION - ONSET
ONSET: ON-GOING

## 2020-10-24 ASSESSMENT — PAIN DESCRIPTION - LOCATION
LOCATION: ANKLE;FOOT

## 2020-10-24 ASSESSMENT — PAIN DESCRIPTION - DESCRIPTORS
DESCRIPTORS: ACHING;DISCOMFORT
DESCRIPTORS: ACHING;CONSTANT
DESCRIPTORS: ACHING;CONSTANT;DISCOMFORT
DESCRIPTORS: ACHING

## 2020-10-24 NOTE — CARE COORDINATION
Social Work-Phone call to [facility] to see if they are able to accommodate patient, if he is on Unasyn q 6 hours. They state that  will need to call back Mon to discuss.  Jonny Sutton

## 2020-10-24 NOTE — PROGRESS NOTES
redness, swelling and an open area to his left foot. He was prescribed ciprofloxacin 1 week ago and did not see any improvement. He was seen by Dr. Maureen Briggs with podiatry who encouraged him to come to the ER for further evaluation. The patient endorses neuropathy to his bilateral feet, he does report some pain to his left foot that he describes as dull, intermittent and moderate in intensity. It is aggravated with movement. No definitive alleviating factors. He denies fever, chills, nausea or vomiting. No additional symptomology. He has past medical history that includes type 2 diabetes, CKD and RAÚL with nightly BiPAP use.     Creatinine 1.51, WBC 11.1, glucose 280, .1, sed rate 86.\"    Review of Systems:     Constitutional:  negative for chills, fevers, sweats  Respiratory:  negative for cough, dyspnea on exertion, shortness of breath, wheezing  Cardiovascular:  negative for chest pain, chest pressure/discomfort, palpitations  Gastrointestinal:  negative for abdominal pain, constipation, diarrhea, nausea, vomiting  Neurological:  negative for dizziness, headache    Medications:      Allergies:  No Known Allergies    Current Meds:   Scheduled Meds:    ampicillin-sulbactam  3 g Intravenous Q6H    insulin glargine  40 Units Subcutaneous BID    insulin lispro  0-12 Units Subcutaneous TID WC    insulin lispro  0-6 Units Subcutaneous Nightly    allopurinol  300 mg Oral Daily    gabapentin  300 mg Oral BID    magnesium oxide  400 mg Oral Daily    therapeutic multivitamin-minerals  1 tablet Oral Daily    sodium chloride flush  10 mL Intravenous 2 times per day    heparin (porcine)  5,000 Units Subcutaneous 3 times per day     Continuous Infusions:    dextrose       PRN Meds: oxyCODONE **OR** oxyCODONE, LORazepam, melatonin, sodium chloride flush, potassium chloride **OR** potassium alternative oral replacement **OR** potassium chloride, magnesium sulfate, acetaminophen **OR** acetaminophen, FIO2  Lab Results   Component Value Date/Time    SPECIAL NOT REPORTED 10/21/2020 10:16 AM     Lab Results   Component Value Date/Time    CULTURE ENTEROCOCCUS FAECALIS LIGHT GROWTH (A) 10/21/2020 10:16 AM    CULTURE NORMAL CHHAYA 10/21/2020 10:16 AM       Radiology:  Macario Karin Foot Left (min 3 Views)    Result Date: 10/19/2020  Abnormality of the base of the 1st proximal phalanx. This appearance could be caused by chronic infectious process or fracture. MRI may be helpful for better characterization     Mri Foot Left Wo Contrast    Result Date: 10/20/2020  1. Plantar ulceration at the level of the 1st MTP joint with large focal underlying soft tissue abnormality measuring 4.9 x 2.1 x 4.3 cm. Phlegmonous change is felt most likely. Infectious adventitial bursitis is an alternate consideration. 2. Flexor hallucis longus tenosynovitis and partial thickness tearing. The tendon sheath appears to communicate with the plantar soft tissue collection raising concern for infectious tenosynovitis. Additionally there is tenosynovitis of the proximal flexor digitorum longus tendon sheaths which may also be infectious. 3. Joint effusion/synovitis at the 1st MTP joint with associated marrow signal abnormality. Septic arthritis and osteomyelitis should be considered. 4. Marrow signal abnormality of the 1st distal phalanx may reflect early osteomyelitis.   Phlegmonous change appears to extend along the plantar aspect of the great toe.     ekg today shows inferior q waves III, avf and poor r wave progression anteriorly, suggestive of possible old mi-which he denies; the ekg is basically same as from jan 2016 ekg     Physical Examination:        General appearance:  alert, cooperative and no distress  Mental Status:  oriented to person, place and time and normal affect  Lungs:  clear to auscultation bilaterally, normal effort  Heart:  regular rate and rhythm, no murmur  Abdomen:  soft, nontender, nondistended, normal bowel sounds, no masses, hepatomegaly, splenomegaly; obese  Extremities:  no edema, redness, tenderness in the calves  Skin:  See pics right foot    Assessment:        Hospital Problems           Last Modified POA    * (Principal) Non-healing ulcer of foot, left, with unspecified severity ([address] 10/19/2020 Yes    Type 2 diabetes mellitus with hyperglycemia, with long-term current use of insulin ([address] 10/19/2020 Yes    Diabetic autonomic neuropathy associated with type 2 diabetes mellitus ([address] 10/19/2020 Yes    Obstructive sleep apnea 10/19/2020 Yes    Class 3 severe obesity due to excess calories with serious comorbidity and body mass index (BMI) of 45.0 to 49.9 in adult [facility]) 10/19/2020 Yes    CKD (chronic kidney disease) stage 3, GFR 30-59 ml/min 10/20/2020 Yes    Sepsis ([address] 10/22/2020 Yes          Plan:        Dc ivf  Increased lantus to try and improve glucose levels to promote healing  Cont home cpap  Placed on iv unasyn per ID  Dc planning to snf once arrangements made      Sagrario Dai, DO  10/24/2020  11:19 AM

## 2020-10-24 NOTE — PROGRESS NOTES
Physical Therapy  Facility/Department: [facility]  Daily Treatment Note  NAME: Russell Collier  : 1960  MRN: [id number]    Date of Service: 10/24/2020    Discharge Recommendations:  Subacute/Skilled Nursing Facility   Assessment   Body structures, Functions, Activity limitations: Decreased functional mobility ; Decreased safe awareness; Increased pain  Assessment: Pt requiring need for skilled therapy due to poor safety awareness and  increased assist with transfers and gait d/t NWB L LE. Patient demonstrates decreased balance with mobility and is a high fall risk due to L LE NWB and difficulties with maintaining weight bearing restriction with all functional mobility. Recommend D/C to SNF at this time due to being a fall risk and uanble to maintain NWB. Prognosis: Good  Decision Making: Medium Complexity  REQUIRES PT FOLLOW UP: No  Activity Tolerance  Activity Tolerance: Patient limited by endurance; Patient limited by fatigue;Patient limited by pain  Activity Tolerance: gait distance limited by unable to maintain L LE NWB     Patient Diagnosis(es): The primary encounter diagnosis was Diabetic ulcer of left foot associated with type 2 diabetes mellitus, with other ulcer severity, unspecified part of foot ([address] Diagnoses of Cellulitis of left lower extremity and Acute post-operative pain were also pertinent to this visit. has a past medical history of Chronic kidney disease, Diabetes mellitus ([address] Diabetic neuropathy ([address] History of kidney stones, and Sleep apnea. has a past surgical history that includes Lithotripsy (); Appendectomy; Nasal septum surgery; Foot Debridement (Left, 2016); skin split graft (Left, 2016); Foot surgery (Left, 2019); Foot Debridement (Left, 2019); Foot surgery (Left, 2019); Foot surgery (Left, 2019); Foot Debridement (Left, 3/12/2019); other surgical history (Left, 2019); Skin graft (Left, 4/3/2019);  Foot Debridement (Left, 04/18/2019); Skin graft (Left, 4/18/2019); Skin graft (Left, 5/7/2019); and Toe amputation (Left, 10/21/2020). Restrictions  Restrictions/Precautions  Restrictions/Precautions: Weight Bearing, Fall Risk, General Precautions, Up as Tolerated  Required Braces or Orthoses?: No  Lower Extremity Weight Bearing Restrictions  Left Lower Extremity Weight Bearing: Non Weight Bearing  Required Braces or Orthoses  Right Lower Extremity Brace: (L surgical shoe)  Position Activity Restriction  Other position/activity restrictions: Strict NWB following sx: amputation L hallux  Subjective   General  Chart Reviewed: Yes  Family / Caregiver Present: No  Subjective  Subjective: Pt agreeable to PT  General Comment  Comments: Lisette Cisneros RN approves therapy session       Orientation  Orientation  Overall Orientation Status: Within Normal Limits     Objective   Bed mobility  Rolling: Supervision  Supine to sit: Supervision  Scooting: Independent  Transfers  Sit to Stand: Contact guard assistance;Minimal Assistance ( poor initial standing balance)  Comment:  Education on proper technique and hand placement with sit to stand transfer using a rw  Ambulation  Ambulation?: Yes  WB Status: L LE NWB  More Ambulation?: No  Ambulation 1  Surface: level tile  Device: Rolling Walker  Assistance: Minimal assistance  Quality of Gait: unsteady, difficulty maintaining NWB L foot  Gait Deviations: Decreased step length  Distance: 3ft  Comments: Max verbal cues to maintain NWB and for safety(Pt moves quickly without regard for safety)     Balance  Sitting - Static: Good  Sitting - Dynamic: Good  Standing - Static: Fair  Standing - Dynamic: Fair  Exercises  Comments: Sit to stands x 3 from chair with a rolling walker.  (Extended time spent with patient on safety with transfers and all functional mobility)      AM-PAC Score     AM-PAC Inpatient Mobility without Stair Climbing Raw Score : 12 (10/24/20 1827)  AM-PAC Inpatient without Stair Climbing T-Scale

## 2020-10-24 NOTE — PROGRESS NOTES
at least a year. 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 The patient reports that the wound had healed/closed about 2 weeks ago. He subsequently developed soft tissue swelling and an abscess which started to drain spontaneously and he reports having 2 open wounds on the plantar aspect of his foot. The patient went to see his podiatrist for follow-up and it was recommended that he be sent into the hospital for admission to receive IV antibiotic therapy and possible surgical drainage. He did not report any subjective fevers or chills. He has had some arthralgias and myalgias in the left foot with some swelling and redness as well as to the draining ulcerations form. The patient was admitted with a left-sided diabetic foot infection and has been seen by the podiatry service and there was concern for a soft tissue abscess with associated osteomyelitis    Patient was started on cefepime and Zyvox  The patient was seen by the vascular surgery team did undergo vascular studies and though there was some calcinosis there was adequate peripheral blood flow.     The patient had an MRI of the foot done that suggested osteomyelitis of the proximal phalanx as well as of the sesamoids of the left foot.   The patient was taken to the operating room today 10/21/2020 and underwent amputation of the hallux, removal of the tibial sesamoid, removal of the fibular sesamoid, with bone biopsy of the first metatarsal.    Current evaluation:10/26/2020    BP (!) 132/47   Pulse 78   Temp 98.2 °F (36.8 °C) (Oral)   Resp 15   Ht 6' 4\" (1.93 m)   Wt (!) 363 lb 3.2 oz (164.7 kg)   SpO2 91%   BMI 44.21 kg/m²     Temperature Range: Temp: 98.2 °F (36.8 °C) Temp  Av °F (36.7 °C)  Min: 97.7 °F (36.5 °C)  Max: 98.2 °F (36.8 °C)  The patient is seen and evaluated at bedside he is awake and alert in no acute distress. No subjective fever or chills. No abdominal pain nausea vomiting or diarrhea. We had a lengthy discussion about his infections and surgical pathology results    Review of Systems   Constitutional: Negative. Respiratory: Negative. Cardiovascular: Negative. Gastrointestinal: Negative. Genitourinary: Negative. Musculoskeletal: Negative. Skin: Positive for wound. Allergic/Immunologic: Negative. Neurological: Negative. Physical Examination :     Physical Exam  Constitutional:       Appearance: He is well-developed. HENT:      Head: Normocephalic and atraumatic. Neck:      Musculoskeletal: Normal range of motion and neck supple. Cardiovascular:      Rate and Rhythm: Normal rate. Heart sounds: Normal heart sounds. No friction rub. No gallop. Pulmonary:      Effort: Pulmonary effort is normal.      Breath sounds: Normal breath sounds. No wheezing. Abdominal:      General: Bowel sounds are normal.      Palpations: Abdomen is soft. There is no mass. Tenderness: There is no abdominal tenderness. Musculoskeletal: Normal range of motion. Lymphadenopathy:      Cervical: No cervical adenopathy. Skin:     General: Skin is warm and dry. Comments: The dressing is in place with the pictures from podiatry this morning were reviewed   Neurological:      Mental Status: He is alert and oriented to person, place, and time. Laboratory data:   I have independently reviewed the followinglabs:  CBC with Differential:   Recent Labs     10/24/20  0517   WBC 8.0   HGB 12.9*   HCT 39.0*      LYMPHOPCT 25   MONOPCT 9     BMP:   Recent Labs     10/24/20  0517      K 4.1   CL 99   CO2 28   BUN 17   CREATININE 1.30*     Hepatic Function Panel: No results for input(s): PROT, LABALBU, BILIDIR, IBILI, BILITOT, ALKPHOS, ALT, AST in the last 72 hours.       No results found for: PROCAL  Lab Results   Component Value Date    CRP 94.2 10/23/2020    .9 10/21/2020    .1 10/19/2020     Lab Results   Component Value Date    SEDRATE 80 (H) 10/19/2020         No results found for: DDIMER  No results found for: FERRITIN  No results found for: LDH  No results found for: FIBRINOGEN    Results in Past 30 Days  Result Component Current Result Ref Range Previous Result Ref Range   SARS-CoV-2      (10/19/2020)  Not in Time Range          (10/19/2020)       Not Detected (10/19/2020) Not Detected       Lab Results   Component Value Date    COVID19 Not Detected 10/19/2020       No results for input(s): VANCOTROUGH in the last 72 hours. Surgical Pathology Report  10/21/2020  2:11 PM  [address]    -- Diagnosis --   1.  LEFT HALLUX, AMPUTATION:- ACUTE AND CHRONIC CELLULITIS IN   SUBCUTANEOUS SOFT TISSUE WITH ABSCESS,    EXTENDING TO THE SOFT TISSUE MARGIN.- ACUTE AND CHRONIC OSTEOMYELITIS   WITH FOCAL MEDULLARY FIBROSIS.- BONE AND CARTILAGE AT THE SURGICAL   MARGIN APPEAR VIABLE. 2.  LEFT FOOT, SOFT TISSUE, EXCISION:- ULCERATION, SEVERE ACUTE AND   CHRONIC CELLULITIS WITH CHRONIC ACTIVE    ABSCESS AND FIBROSIS. 3.  LEFT FOOT, FIRST BONE, BIOPSY:- FOCAL ACUTE OSTEOMYELITIS. 4.  LEFT FOOT, SESAMOID BONE, EXCISION:- ACUTE SUPPURATIVE AND CHRONIC   OSTEOMYELITIS WITH EROSION/ULCERATION    AND DESTRUCTIVE BONE CHANGES.        Raymond Vogt Sender,   **Electronically Signed Out**         sls/10/22/2020            Imaging Studies:   No new imaging    Cultures:     Culture, Anaerobic and Aerobic [[id number]]  (Abnormal)   Collected: 10/19/20 2008    Order Status: Completed  Specimen: Foot  Updated: 10/22/20 2228     Specimen Description  . FOOT LEFT FOOT     Special Requests  NOT REPORTED     Direct Exam  FEW NEUTROPHILSAbnormal        FEW GRAM POSITIVE COCCI IN CLUSTERSAbnormal        RARE GRAM NEGATIVE RODSAbnormal       Culture  ENTEROCOCCUS FAECALIS LIGHT GROWTHAbnormal        NORMAL CHHAYA      FINEGOLDIA MAGNA LIGHT GROWTHAbnormal     Enterococcus  faecalis (1)     Antibiotic  Interpretation  RICHARD  Status     ampicillin  Sensitive   Preliminary      <=2   SUSCEPTIBLE    penicillin    Preliminary      NOT REPORTED    ciprofloxacin    Preliminary      NOT REPORTED    erythromycin    Preliminary      NOT REPORTED    Gentamicin, High Level   NOT REPORTED  Preliminary     levofloxacin    Preliminary      NOT REPORTED    linezolid    Preliminary      NOT REPORTED    nitrofurantoin    Preliminary      NOT REPORTED    Synercid    Preliminary      NOT REPORTED    Streptomycin, Hi Level   NOT REPORTED  Preliminary     tetracycline    Preliminary      NOT REPORTED    tigecycline    Preliminary      NOT REPORTED    vancomycin  Sensitive   Preliminary      1   SUSCEPTIBLE      Culture, Anaerobic and Aerobic [[id number]]  (Abnormal)   Collected: 10/21/20 1016    Order Status: Completed  Specimen: Bone from Foot  Updated: 10/24/20 0610     Specimen Description  . FOOT LEFT SESMOID     Special Requests  NOT REPORTED     Direct Exam  FEW NEUTROPHILSAbnormal        FEW GRAM POSITIVE COCCI IN PAIRS AND CLUSTERSAbnormal       Culture  ENTEROCOCCUS FAECALIS LIGHT GROWTHAbnormal        NORMAL CHHAYA      FINEGOLDIA MAGNA MODERATE GROWTHAbnormal     Enterococcus  faecalis (1)     Antibiotic  Interpretation  RICHARD  Status     ampicillin  Sensitive   Preliminary      <=2   SUSCEPTIBLE    penicillin    Preliminary      NOT REPORTED    ciprofloxacin    Preliminary      NOT REPORTED    erythromycin    Preliminary      NOT REPORTED    Gentamicin, High Level   NOT REPORTED  Preliminary     levofloxacin    Preliminary      NOT REPORTED    linezolid    Preliminary      NOT REPORTED    nitrofurantoin    Preliminary      NOT REPORTED    Synercid    Preliminary      NOT REPORTED    Streptomycin, Hi Level   NOT REPORTED  Preliminary     tetracycline    Preliminary      NOT REPORTED    tigecycline    Preliminary      NOT REPORTED    vancomycin  Sensitive   Preliminary      1   SUSCEPTIBLE Culture, Anaerobic and Aerobic [[id number]]  (Abnormal)  Collected: 10/21/20 1015    Order Status: Completed  Specimen: Bone from Foot  Updated: 10/25/20 1630     Specimen Description  . FOOT LEFT 1ST BONE     Special Requests  NOT REPORTED     Direct Exam  NO NEUTROPHILS SEEN      NO BACTERIA SEEN     Culture  STAPHYLOCOCCUS SPECIES, COAGULASE NEGATIVE 1 COLONY FORMING UNIT Susceptibilities have not been performed.  Notify the laboratory within 7 days for further workup if clinically indicated. Abnormal        NO ANAEROBIC ORGANISMS ISOLATED AT 4 DAYSAbnormal         Medications:      ampicillin-sulbactam  3 g Intravenous Q6H    insulin glargine  40 Units Subcutaneous BID    insulin lispro  0-12 Units Subcutaneous TID WC    insulin lispro  0-6 Units Subcutaneous Nightly    allopurinol  300 mg Oral Daily    gabapentin  300 mg Oral BID    magnesium oxide  400 mg Oral Daily    therapeutic multivitamin-minerals  1 tablet Oral Daily    sodium chloride flush  10 mL Intravenous 2 times per day    heparin (porcine)  5,000 Units Subcutaneous 3 times per day           [facility]  [address]  [employer] messaging  OFFICE: [phone]      Electronically signed by [address] [place] [address] Street, MD on 10/26/2020 at 11:06 AM  Thank you for allowing us to participate in the care of this patient. Please call with questions. This note iscreated with the assistance of a speech recognition program.  While intending to generate a document that actually reflects the content of the visit, the document can still have some errors including those of syntax andsound a like substitutions which may escape proof reading. In such instances, actual meaning can be extrapolated by contextual diversion.

## 2020-10-24 NOTE — PLAN OF CARE
Problem: Falls - Risk of:  Goal: Will remain free from falls  Description: Will remain free from falls  10/24/2020 1128 by Demetra Silverman RN  Outcome: Ongoing  Note: Patient is a fall risk during this admission. Fall risk assessment was performed. Patient is absent of falls. Bed is in the lowest position. Wheels on the bed are locked. Call light and bed side table are within reach. Clutter is removed. Patient was educated to call out when needing assistance or wanting to get out of bed. Patient offered toileting assistance during rounding. Hourly rounds have been performed. Problem: Pain:  Goal: Patient's pain/discomfort is manageable  Description: Patient's pain/discomfort is manageable  10/24/2020 1128 by Demetra Silverman RN  Outcome: Ongoing  Note: Pt medicated with pain medication prn. Assessed all pain characteristics including level, type, location, frequency, and onset. Non-pharmacologic interventions offered to pt as well. Pt states pain is tolerable at this time. Will continue to monitor      Problem: Discharge Planning:  Goal: Discharged to appropriate level of care  Description: Discharged to appropriate level of care  10/24/2020 1128 by Demetra Silverman RN  Outcome: Ongoing  Note: Patient's blood sugars continue to be checked before meals and before bed. Sliding scale insulin available for blood sugars 140 or greater. Physician updated as needed.

## 2020-10-24 NOTE — CARE COORDINATION
Social Work-Cliff will be on IV Unasyn for at least 2 weeks. Will need to contact [facility] Mon morning to assure they can provide the IV Atb.  Maru Blood

## 2020-10-24 NOTE — PROGRESS NOTES
Occupational Therapy  Facility/Department: [facility]  Daily Treatment Note  NAME: Roger Estrada  : 1960  MRN: [id number]    Date of Service: 10/24/2020    Discharge Recommendations:  Subacute/Skilled Nursing Facility       Assessment   Performance deficits / Impairments: Decreased functional mobility ; Decreased ADL status; Decreased strength;Decreased safe awareness;Decreased balance  Prognosis: Good  REQUIRES OT FOLLOW UP: Yes  Activity Tolerance  Activity Tolerance: Patient Tolerated treatment well  Activity Tolerance: limited by inability to maintain NWB  Safety Devices  Safety Devices in place: Yes  Type of devices: Call light within reach; Patient at risk for falls; Left in bed;Nurse notified; Bed alarm in place         Patient Diagnosis(es): The primary encounter diagnosis was Diabetic ulcer of left foot associated with type 2 diabetes mellitus, with other ulcer severity, unspecified part of foot ([facility] Diagnoses of Cellulitis of left lower extremity and Acute post-operative pain were also pertinent to this visit. has a past medical history of Chronic kidney disease, Diabetes mellitus ([facility] Diabetic neuropathy ([facility] [facility] History of kidney stones, and Sleep apnea. has a past surgical history that includes Lithotripsy (); Appendectomy; Nasal septum surgery; Foot Debridement (Left, 2016); skin split graft (Left, 2016); Foot surgery (Left, 2019); Foot Debridement (Left, 2019); Foot surgery (Left, 2019); Foot surgery (Left, 2019); Foot Debridement (Left, 3/12/2019); other surgical history (Left, 2019); Skin graft (Left, 4/3/2019); Foot Debridement (Left, 2019); Skin graft (Left, 2019); Skin graft (Left, 2019); and Toe amputation (Left, 10/21/2020).     Restrictions  Restrictions/Precautions  Restrictions/Precautions: Weight Bearing, Fall Risk, General Precautions, Up as Tolerated  Required Braces or Orthoses?: No  Lower Extremity Weight Bearing assistance  Problem Solving: Assistance required to generate solutions;Assistance required to implement solutions;Decreased awareness of errors;Assistance required to correct errors made;Assistance required to identify errors made  Insights: Decreased awareness of deficits  Initiation: Requires cues for some  Sequencing: Requires cues for some     Perception  Overall Perceptual Status: [facility]                                   Plan   Plan  Times per week: 4-5x/week  Current Treatment Recommendations: Safety Education & Training, Self-Care / ADL, Patient/Caregiver Education & Training, Strengthening, Balance Training, Functional Mobility Training, Endurance Training, Positioning, Equipment Evaluation, Education, & procurement                        AM-PAC Score     AM-PAC Inpatient Mobility without Stair Climbing Raw Score : 15 (10/24/20 1140)  AM-PAC Inpatient without Stair Climbing T-Scale Score : 43.03 (10/24/20 1140)  Mobility Inpatient CMS 0-100% Score: 47.43 (10/24/20 1140)  Mobility Inpatient without Stair CMS G-Code Modifier : CK (10/24/20 1140)  AM-PAC Inpatient Daily Activity Raw Score: 21 (10/24/20 1140)  AM-PAC Inpatient ADL T-Scale Score : 44.27 (10/24/20 1140)  ADL Inpatient CMS 0-100% Score: 32.79 (10/24/20 1140)  ADL Inpatient CMS G-Code Modifier : Kaylie Ruiz (10/24/20 1140)    Goals  Short term goals  Time Frame for Short term goals: 4-5x/week, 1-2x/day  Short term goal 1: demo CGA with functional mob short distance for ADL completion with good adherance to NWB  Short term goal 2: demo CGA with toileting routine with approp DME and good safety  Short term goal 3: demo and verb good understanding of fall prevention techs, NWB techs, and possible equip needs  Patient Goals   Patient goals : to go home       Therapy Time   Individual Concurrent Group Co-treatment   Time In 0854         Time Out 0918         Minutes Veronika Valle, ROGER

## 2020-10-24 NOTE — PLAN OF CARE
Problem: Falls - Risk of:  Goal: Will remain free from falls  Description: Will remain free from falls  Outcome: Ongoing     Problem: Falls - Risk of:  Goal: Absence of physical injury  Description: Absence of physical injury  Outcome: Ongoing     Problem: Infection:  Goal: Will remain free from infection  Description: Will remain free from infection  Outcome: Ongoing     Problem: Safety:  Goal: Free from accidental physical injury  Description: Free from accidental physical injury  Outcome: Ongoing     Problem: Safety:  Goal: Free from intentional harm  Description: Free from intentional harm  Outcome: Ongoing     Problem: Daily Care:  Goal: Daily care needs are met  Description: Daily care needs are met  Outcome: Ongoing     Problem: Pain:  Goal: Patient's pain/discomfort is manageable  Description: Patient's pain/discomfort is manageable  Outcome: Ongoing     Problem: Pain:  Goal: Control of acute pain  Description: Control of acute pain  Outcome: Ongoing     Problem: Pain:  Goal: Control of chronic pain  Description: Control of chronic pain  Outcome: Ongoing     Problem: Skin Integrity:  Goal: Skin integrity will stabilize  Description: Skin integrity will stabilize  Outcome: Ongoing     Problem: Discharge Planning:  Goal: Patients continuum of care needs are met  Description: Patients continuum of care needs are met  Outcome: Ongoing     Problem: Nutrition  Goal: Optimal nutrition therapy  Outcome: Ongoing     Problem: Discharge Planning:  Goal: Discharged to appropriate level of care  Description: Discharged to appropriate level of care  Outcome: Ongoing     Problem: Serum Glucose Level - Abnormal:  Goal: Ability to maintain appropriate glucose levels will improve  Description: Ability to maintain appropriate glucose levels will improve  Outcome: Ongoing     Problem: Sensory Perception - Impaired:  Goal: Ability to maintain a stable neurologic state will improve  Description: Ability to maintain a stable neurologic state will improve  Outcome: Ongoing

## 2020-10-24 NOTE — CARE COORDINATION
Social Work-Contacted [facility]. If patient is dc on Unasyn q 6 hours, they can not accept.  Will need clarification of atb and if it will be IV, how often, and estimated date of IV Kylie Blanco

## 2020-10-24 NOTE — PROGRESS NOTES
Progress Note  Podiatric Medicine and Surgery     Subjective     CC: Left foot wound    POD #3 s/p left hallux amp, sesamoidectomy, and 1st metatarsal bone bx (DOS: 10/21/20)  Patient seen and examined at bedside. Bone biopsy of 1st metatarsal positive for osteomyelitis  Afebrile, VS stable  Pain controlled  PT amenable for d/c to SNF post-operatively    HPI :  Kingston Ventura is a 63 y.o. male seen at [facility] ED for left foot wound. Patient notes that he has had a left foot wound for the last year that has been treated by podiatrist Dr. Benton Griffith. Has a surgical shoe for the left foot and has been performing daily dressing changes and has been soaking his foot in saline. Denies any new falls or trauma. Relates that he has had a history of a fracture in his left great toe for quite some time and that is not new. Notes that he has very little sensation to feet however his great toe has a constant sharp localized pain to his great toe joint. Has been on 1 week course of Cipro and notes that since Monday left foot has become more red and swollen and painful with increasing drainage. Patient was seen by Dr. Ronna Thurston today who recommended reporting to ED for further evaluation. ROS: Denies N/V/F/C/SOB/CP. Otherwise negative except at stated in the HPI.      Medications:  Scheduled Meds:   ampicillin-sulbactam  3 g Intravenous Q6H    insulin glargine  40 Units Subcutaneous BID    insulin lispro  0-12 Units Subcutaneous TID WC    insulin lispro  0-6 Units Subcutaneous Nightly    allopurinol  300 mg Oral Daily    gabapentin  300 mg Oral BID    magnesium oxide  400 mg Oral Daily    therapeutic multivitamin-minerals  1 tablet Oral Daily    sodium chloride flush  10 mL Intravenous 2 times per day    heparin (porcine)  5,000 Units Subcutaneous 3 times per day       Continuous Infusions:   dextrose         PRN Meds:oxyCODONE **OR** oxyCODONE, LORazepam, melatonin, sodium chloride flush, potassium chloride **OR** potassium alternative oral replacement **OR** potassium chloride, magnesium sulfate, acetaminophen **OR** acetaminophen, polyethylene glycol, promethazine **OR** ondansetron, nicotine, glucose, dextrose, glucagon (rDNA), dextrose, morphine    Objective     Vitals:  Patient Vitals for the past 8 hrs:   Weight   10/24/20 0547 (!) 365 lb (165.6 kg)     Average, Min, and Max for last 24 hours Vitals:  TEMPERATURE:  Temp  Av.9 °F (36.6 °C)  Min: 97.9 °F (36.6 °C)  Max: 97.9 °F (36.6 °C)    RESPIRATIONS RANGE: Resp  Av  Min: 20  Max: 20    PULSE RANGE: Pulse  Av  Min: 74  Max: 74    BLOOD PRESSURE RANGE:  Systolic (14DUA), EBB:516 , Min:125 , SWQ:646   ; Diastolic (38RRY), DRI:63, Min:44, Max:44      PULSE OXIMETRY RANGE: SpO2  Av %  Min: 95 %  Max: 95 %    I/O last 3 completed shifts: In: 350 [IV Piggyback:350]  Out: 850 [Urine:850]    CBC:  Recent Labs     10/23/20  0540 10/24/20  05   WBC 7.8 8.0   HGB 12.8* 12.9*   HCT 40.0* 39.0*    321   CRP 94.2*  --         BMP:  Recent Labs     10/23/20  0540 10/24/20  05    136   K 4.3 4.1    99   CO2 27 28   BUN 19 17   CREATININE 1.31* 1.30*   GLUCOSE 211* 156*   CALCIUM 9.7 9.5        Coags:  No results for input(s): APTT, PROT, INR in the last 72 hours. Lab Results   Component Value Date    SEDRATE 86 (H) 10/19/2020     Recent Labs     10/23/20  0540   CRP 94.2*       Lower Extremity Physical Exam:   Vascular: DP and PT pulses are palpable. CFT <3 seconds to all digits. Hair growth is absent to the level of the digits. Edema to the foot and ankle appreciated.     Neuro: Saph/sural/SP/DP/plantar sensation absent to light touch.     Musculoskeletal: Muscle strength is 5/5 to all lower extremity muscle groups. Gross deformity present, s/p left hallux amputation. No tenderness noted to amputation site. Negative pain on calf squeeze bilaterally.  All muscle compartments soft compressible.     Dermatologic: Incision of the left hallux amputation extending from the dorsal foot to the plantar foot. Sutures intact with skin edges well approximated with distal opening. Macerated tissue noted to the distal wound edge, improved from previous exam. Wound probes deep to bone approximately 2.5 cm to first metatarsal and deep plantarly approximately 4.0cm. Scant sanguinous drainage appreciated with no malodor. Periincisional edema and erythema with an increased warmth noted consistent with postop state. Clinical Images:             Imaging:   VL LOWER EXTREMITY ARTERIAL SEGMENTAL PRESSURES W PPG   Final Result      MRI FOOT LEFT WO CONTRAST   Final Result   1. Plantar ulceration at the level of the 1st MTP joint with large focal   underlying soft tissue abnormality measuring 4.9 x 2.1 x 4.3 cm. Phlegmonous   change is felt most likely. Infectious adventitial bursitis is an alternate   consideration. 2. Flexor hallucis longus tenosynovitis and partial thickness tearing. The   tendon sheath appears to communicate with the plantar soft tissue collection   raising concern for infectious tenosynovitis. Additionally there is   tenosynovitis of the proximal flexor digitorum longus tendon sheaths which   may also be infectious. 3. Joint effusion/synovitis at the 1st MTP joint with associated marrow   signal abnormality. Septic arthritis and osteomyelitis should be considered. 4. Marrow signal abnormality of the 1st distal phalanx may reflect early   osteomyelitis. Phlegmonous change appears to extend along the plantar aspect   of the great toe. XR FOOT LEFT (MIN 3 VIEWS)   Final Result   Abnormality of the base of the 1st proximal phalanx. This appearance could   be caused by chronic infectious process or fracture.   MRI may be helpful for   better characterization             Cultures:   Left foot swab (10/19)- GPC, rare GNR, E faecalis  Left first metatarsal bone (10/21)- Coag negative staph  Left sesamoid (10/21)- Few GPC in pairs and clusters    Bone biopsy  1. Left hallux- Acute on chronic OM with cellulitis and abscess  2. Left 1st metatarsal- Focal acute OM  3. Left sesamoid- Acute on chronic OM    Assessment   Karli Curran is a 63 y.o. male with status post amputation and sesamoidectomy  1. S/p left hallux amputation with tibial and fibular sesamoidectomy (DOS: 10/21/20)  2. Acute OM of 1st metatarsal  3. Abscess left foot- Resolved  4. TARA  5. Cellulitis left foot- Improving  6. Diabetic foot ulcer down to bone left foot  7. DM2 with peripheral neuropathy    Principal Problem:    Non-healing ulcer of foot, left, with unspecified severity ([address]  Active Problems:    Type 2 diabetes mellitus with hyperglycemia, with long-term current use of insulin ([facility])    Diabetic autonomic neuropathy associated with type 2 diabetes mellitus ([address]    Obstructive sleep apnea    Class 3 severe obesity due to excess calories with serious comorbidity and body mass index (BMI) of 45.0 to 49.9 in [facility])    CKD (chronic kidney disease) stage 3, GFR 30-59 ml/min    Sepsis ([address]  Resolved Problems:    Diabetic ulcer of left foot associated with type 2 diabetes mellitus ([address]    Cellulitis of left lower extremity       Plan     · Patient examined and evaluated at bedside   · Treatment options discussed in detail with the patient. · ID on board: Currently on Unasyn. Waiting for final antibiotic recommendations  · Left foot wound painted with betadine and packed with 1/4 inch iodoform, dressed with Adaptic, 4 x 4, ABD, Kerlix, Ace. · Patient Currently stable for discharge from podiatry standpoint once final abx recommendations are made by ID and precert is complete. · Strict nonweight bearing to the LLE. Patient to follow up with Dr. Nikia Velázquez within 1 week of discharge. · Nursing to perform daily dressings changes while admitted consisting of 1/4 inch iodoform, adaptic, 4x4 fluffs, ABD, kerlix, and Ace.    · Podiatry will follow from [place] while admitted  · Discussed with Dr. Kelly Hansen. Nael Parra DPM   Podiatric Medicine & Surgery   10/24/2020 at 7:37 AM     Senior Resident Statement:  I have discussed the case, including pertinent history and exam findings with the resident. I agree with the assessment, plan and orders as documented by the intern.       Electronically signed by Alma Truong DPM on 10/24/2020 at 2:16 PM

## 2020-10-25 LAB
GLUCOSE BLD-MCNC: 142 MG/DL (ref 75–110)
GLUCOSE BLD-MCNC: 175 MG/DL (ref 75–110)
GLUCOSE BLD-MCNC: 225 MG/DL (ref 75–110)
GLUCOSE BLD-MCNC: 261 MG/DL (ref 75–110)

## 2020-10-25 PROCEDURE — 82947 ASSAY GLUCOSE BLOOD QUANT: CPT

## 2020-10-25 PROCEDURE — 6360000002 HC RX W HCPCS: Performed by: STUDENT IN AN ORGANIZED HEALTH CARE EDUCATION/TRAINING PROGRAM

## 2020-10-25 PROCEDURE — 97116 GAIT TRAINING THERAPY: CPT

## 2020-10-25 PROCEDURE — 6370000000 HC RX 637 (ALT 250 FOR IP): Performed by: STUDENT IN AN ORGANIZED HEALTH CARE EDUCATION/TRAINING PROGRAM

## 2020-10-25 PROCEDURE — 6370000000 HC RX 637 (ALT 250 FOR IP): Performed by: INTERNAL MEDICINE

## 2020-10-25 PROCEDURE — 99231 SBSQ HOSP IP/OBS SF/LOW 25: CPT | Performed by: INTERNAL MEDICINE

## 2020-10-25 PROCEDURE — 6360000002 HC RX W HCPCS: Performed by: INTERNAL MEDICINE

## 2020-10-25 PROCEDURE — 97110 THERAPEUTIC EXERCISES: CPT

## 2020-10-25 PROCEDURE — 1200000000 HC SEMI PRIVATE

## 2020-10-25 PROCEDURE — 97530 THERAPEUTIC ACTIVITIES: CPT

## 2020-10-25 PROCEDURE — 2580000003 HC RX 258: Performed by: STUDENT IN AN ORGANIZED HEALTH CARE EDUCATION/TRAINING PROGRAM

## 2020-10-25 PROCEDURE — 2580000003 HC RX 258: Performed by: INTERNAL MEDICINE

## 2020-10-25 RX ADMIN — OXYCODONE HYDROCHLORIDE 10 MG: 5 TABLET ORAL at 18:42

## 2020-10-25 RX ADMIN — GABAPENTIN 300 MG: 300 CAPSULE ORAL at 08:04

## 2020-10-25 RX ADMIN — OXYCODONE HYDROCHLORIDE 10 MG: 5 TABLET ORAL at 08:12

## 2020-10-25 RX ADMIN — HEPARIN SODIUM 5000 UNITS: 5000 INJECTION INTRAVENOUS; SUBCUTANEOUS at 20:45

## 2020-10-25 RX ADMIN — ACETAMINOPHEN 650 MG: 325 TABLET ORAL at 18:42

## 2020-10-25 RX ADMIN — SODIUM CHLORIDE, PRESERVATIVE FREE 10 ML: 5 INJECTION INTRAVENOUS at 08:05

## 2020-10-25 RX ADMIN — OXYCODONE HYDROCHLORIDE 10 MG: 5 TABLET ORAL at 14:08

## 2020-10-25 RX ADMIN — SODIUM CHLORIDE 3 G: 900 INJECTION INTRAVENOUS at 20:46

## 2020-10-25 RX ADMIN — INSULIN LISPRO 2 UNITS: 100 INJECTION, SOLUTION INTRAVENOUS; SUBCUTANEOUS at 08:05

## 2020-10-25 RX ADMIN — SODIUM CHLORIDE 3 G: 900 INJECTION INTRAVENOUS at 03:02

## 2020-10-25 RX ADMIN — INSULIN LISPRO 2 UNITS: 100 INJECTION, SOLUTION INTRAVENOUS; SUBCUTANEOUS at 20:45

## 2020-10-25 RX ADMIN — INSULIN GLARGINE 40 UNITS: 100 INJECTION, SOLUTION SUBCUTANEOUS at 08:04

## 2020-10-25 RX ADMIN — OXYCODONE HYDROCHLORIDE 10 MG: 5 TABLET ORAL at 03:04

## 2020-10-25 RX ADMIN — OXYCODONE HYDROCHLORIDE 10 MG: 5 TABLET ORAL at 23:20

## 2020-10-25 RX ADMIN — SODIUM CHLORIDE 3 G: 900 INJECTION INTRAVENOUS at 15:15

## 2020-10-25 RX ADMIN — SODIUM CHLORIDE 3 G: 900 INJECTION INTRAVENOUS at 08:56

## 2020-10-25 RX ADMIN — GABAPENTIN 300 MG: 300 CAPSULE ORAL at 20:45

## 2020-10-25 RX ADMIN — INSULIN LISPRO 2 UNITS: 100 INJECTION, SOLUTION INTRAVENOUS; SUBCUTANEOUS at 12:20

## 2020-10-25 RX ADMIN — INSULIN GLARGINE 40 UNITS: 100 INJECTION, SOLUTION SUBCUTANEOUS at 20:45

## 2020-10-25 RX ADMIN — HEPARIN SODIUM 5000 UNITS: 5000 INJECTION INTRAVENOUS; SUBCUTANEOUS at 06:36

## 2020-10-25 RX ADMIN — ALLOPURINOL 300 MG: 300 TABLET ORAL at 08:04

## 2020-10-25 RX ADMIN — SODIUM CHLORIDE, PRESERVATIVE FREE 10 ML: 5 INJECTION INTRAVENOUS at 20:49

## 2020-10-25 RX ADMIN — MAGNESIUM GLUCONATE 500 MG ORAL TABLET 400 MG: 500 TABLET ORAL at 08:04

## 2020-10-25 RX ADMIN — MULTIPLE VITAMINS W/ MINERALS TAB 1 TABLET: TAB at 08:04

## 2020-10-25 RX ADMIN — INSULIN LISPRO 6 UNITS: 100 INJECTION, SOLUTION INTRAVENOUS; SUBCUTANEOUS at 16:40

## 2020-10-25 RX ADMIN — HEPARIN SODIUM 5000 UNITS: 5000 INJECTION INTRAVENOUS; SUBCUTANEOUS at 14:08

## 2020-10-25 RX ADMIN — ACETAMINOPHEN 650 MG: 325 TABLET ORAL at 08:12

## 2020-10-25 ASSESSMENT — PAIN DESCRIPTION - ORIENTATION
ORIENTATION: LEFT

## 2020-10-25 ASSESSMENT — PAIN SCALES - GENERAL
PAINLEVEL_OUTOF10: 7
PAINLEVEL_OUTOF10: 0
PAINLEVEL_OUTOF10: 7
PAINLEVEL_OUTOF10: 7
PAINLEVEL_OUTOF10: 3
PAINLEVEL_OUTOF10: 8
PAINLEVEL_OUTOF10: 7
PAINLEVEL_OUTOF10: 8
PAINLEVEL_OUTOF10: 4
PAINLEVEL_OUTOF10: 8
PAINLEVEL_OUTOF10: 3

## 2020-10-25 ASSESSMENT — PAIN - FUNCTIONAL ASSESSMENT
PAIN_FUNCTIONAL_ASSESSMENT: ACTIVITIES ARE NOT PREVENTED
PAIN_FUNCTIONAL_ASSESSMENT: ACTIVITIES ARE NOT PREVENTED

## 2020-10-25 ASSESSMENT — PAIN DESCRIPTION - PAIN TYPE
TYPE: SURGICAL PAIN

## 2020-10-25 ASSESSMENT — PAIN DESCRIPTION - ONSET
ONSET: ON-GOING

## 2020-10-25 ASSESSMENT — PAIN DESCRIPTION - LOCATION
LOCATION: FOOT
LOCATION: ANKLE;FOOT
LOCATION: FOOT
LOCATION: FOOT

## 2020-10-25 ASSESSMENT — PAIN DESCRIPTION - FREQUENCY
FREQUENCY: INTERMITTENT

## 2020-10-25 ASSESSMENT — PAIN DESCRIPTION - DESCRIPTORS
DESCRIPTORS: ACHING;DISCOMFORT
DESCRIPTORS: ACHING;CONSTANT;DISCOMFORT
DESCRIPTORS: ACHING;DISCOMFORT

## 2020-10-25 ASSESSMENT — PAIN DESCRIPTION - PROGRESSION
CLINICAL_PROGRESSION: NOT CHANGED

## 2020-10-25 NOTE — PROGRESS NOTES
Physical Therapy  Facility/Department: [facility]  Daily Treatment Note  NAME: Jessica Spence  : 1960  MRN: [id number]    Date of Service: 10/25/2020    Discharge Recommendations:  Subacute/Skilled Nursing Facility        Assessment   Body structures, Functions, Activity limitations: Decreased functional mobility ; Decreased safe awareness; Increased pain  Assessment: Skilled therapy needed due to increased assist with transfers and gait, patient is increased fall risk due to L LE NWB and patient unable to maintain NWB due to decreased balance. Recommend D/C to SNF at this time due to being a fall risk and uanble to maintain NWB. Prognosis: Good  PT Education: Goals;PT Role;Plan of Care;Gait Training  Patient Education: discussed discharge plans and patient feels SNF would be safest for him at this time as if he went home he would be going to stay with a friend. REQUIRES PT FOLLOW UP: Yes  Activity Tolerance  Activity Tolerance: Patient limited by endurance; Patient limited by fatigue;Patient limited by pain     Patient Diagnosis(es): The primary encounter diagnosis was Diabetic ulcer of left foot associated with type 2 diabetes mellitus, with other ulcer severity, unspecified part of foot ([address] Diagnoses of Cellulitis of left lower extremity and Acute post-operative pain were also pertinent to this visit. has a past medical history of Chronic kidney disease, Diabetes mellitus ([address] Diabetic neuropathy ([address] History of kidney stones, and Sleep apnea. has a past surgical history that includes Lithotripsy (); Appendectomy; Nasal septum surgery; Foot Debridement (Left, 2016); skin split graft (Left, 2016); Foot surgery (Left, 2019); Foot Debridement (Left, 2019); Foot surgery (Left, 2019); Foot surgery (Left, 2019); Foot Debridement (Left, 3/12/2019); other surgical history (Left, 2019); Skin graft (Left, 4/3/2019); Foot Debridement (Left, 2019);  Skin graft (Left, 4/18/2019); Skin graft (Left, 5/7/2019); and Toe amputation (Left, 10/21/2020). Restrictions  Restrictions/Precautions  Restrictions/Precautions: Weight Bearing, Fall Risk, General Precautions, Up as Tolerated  Required Braces or Orthoses?: No  Lower Extremity Weight Bearing Restrictions  Left Lower Extremity Weight Bearing: Non Weight Bearing  Required Braces or Orthoses  Right Lower Extremity Brace: (L surgical shoe)  Position Activity Restriction  Other position/activity restrictions: Strict NWB following sx: amputation L hallux  Subjective   General  Chart Reviewed: Yes  Family / Caregiver Present: No  Subjective  Subjective: Pt agreeable to PT  General Comment  Comments: Lisette Cisneros RN approves therapy session  Pain Screening  Patient Currently in Pain: Yes  Pain Assessment  Pain Assessment: 0-10  Pain Level: 7  Pain Type: Surgical pain  Pain Location: Foot  Pain Orientation: Left  Vital Signs  Patient Currently in Pain: Yes       Orientation  Orientation  Overall Orientation Status: Within Normal Limits  Cognition   Cognition  Overall Cognitive Status: Exceptions  Arousal/Alertness: Appropriate responses to stimuli  Following Commands: Inconsistently follows commands; Does not follow commands  Attention Span: Appears intact  Memory: Appears intact  Safety Judgement: Decreased awareness of need for safety;Decreased awareness of need for assistance  Problem Solving: Assistance required to generate solutions;Assistance required to implement solutions;Decreased awareness of errors;Assistance required to correct errors made;Assistance required to identify errors made  Insights: Decreased awareness of deficits  Initiation: Requires cues for some  Sequencing: Requires cues for some  Objective   Bed mobility  Rolling to Left: Supervision  Rolling to Right: Supervision  Supine to Sit: Supervision  Scooting: Independent  Transfers  Sit to Stand: Stand by assistance  Stand to sit: Stand by assistance  Stand Pivot Transfers: Stand by assistance  Lateral Transfers: Stand by assistance  Comment: Education on propre technique and hand placement with sit to stand transfer. Patient continue to put weight on Lt heel during sit<>stand with constant VCs of strict NWBing restirictions. Patient not compliant. Ambulation  Ambulation?: Yes  WB Status: L LE NWB  More Ambulation?: Yes  Ambulation 1  Surface: level tile  Device: Axillary Crutches  Assistance: Minimal assistance  Quality of Gait: improve mobility and coordinaiton, Heel WBing with poor carryover of NWB restiriction, decreased steadiness with turns causing for patient to heel WB on LT LE. Gait Deviations: Decreased step length  Distance: 15' x 1  Ambulation 2  Surface - 2: level tile  Device 2: (knee scooter)  Assistance 2: Stand by assistance  Quality of Gait 2: slightly impuslive but not LOB and able to maintain NWBing status on LLE. Distance: 15' x 1     Balance  Posture: Good  Sitting - Static: Good  Sitting - Dynamic: Good  Standing - Static: Fair  Standing - Dynamic: Fair  Comments: Standing balance fair with cructches, Fair + with knee scooter. Exercises  Comments: Seated bandar LE AROM x 20 reps with no rest breaks needed.      Goals  Short term goals  Time Frame for Short term goals: 12 visits  Short term goal 1: Independent sit<>stand while maintaining L NWB  Short term goal 2: Ambulate 40 feet with roll walker L LE NWB    Plan    Plan  Times per week: 1-2x/day for 5-6 days/week  Current Treatment Recommendations: Gait Training, Transfer Training, Endurance Training, Safety Education & Training, Equipment Evaluation, Education, & procurement  Safety Devices  Type of devices: Nurse notified, Gait belt, Call light within reach, All fall risk precautions in place, Left in bed  Restraints  Initially in place: No     Therapy Time   Individual Concurrent Group Co-treatment   Time In 1018         Time Out 1059         Minutes [address] [place], [place]

## 2020-10-25 NOTE — PROGRESS NOTES
[facility]  Office: [address], DO, Kodimarklisa Gonzales, DO, Manuel Amaro, DO, Erik Dai, DO, Ro Lovell MD, Donald Bass MD, Haydee Fontenot MD, Delbert Goode MD, Renee Rodriguez MD, Xiao Koo MD, Sendy Dumont MD, Cassy Parikh MD, Shania Aguayo MD, Kvng Yadav, DO, Sonia Villanueva MD, Tanika Cason MD, Markell Whitten, DO, Geovany Baker MD,  Levy Gallego DO, Sherrie Nevarez MD, Rick Parra MD, Iván Holland, [facility], [facility], CNP, Yeni Saab, CNP, Owen Vela, CNS, Meghan Rosario, CNP, Francisca Coleman, CNP, Alana Walker, CNP, Josep Millan, CNP, Iftikhar Fitzgerald, CNP, SHILPI MehtaC, Arie Granger, [facility], Augusto Cooney, CNP, Rashida Ling, CNP, Liza Garland, CNP, Yasmin Ding, CNP, Danielle Jensen, [facility]    Progress Note    10/25/2020    11:20 AM    Name:   Roger Estrada  MRN:     [id number]     [place]:      [de-identified]   Room:   2021/2021-01  IP Day:  6  Admit Date:  10/19/2020  5:42 PM    PCP:   Alyssia Guzman MD  Code Status:  Full Code    Subjective:     C/C:   Chief Complaint   Patient presents with    Cellulitis     left foot     Interval History Status: not changed. Had foot surgery 10/21  Foot doesn't hurt but ankle swollen and sore   No cp/sob/n/v    Brief History:     Per my REGINA:  \"Shad Rooney is a 63 y.o. Non-/non  male who presents with Cellulitis (left foot)   and is admitted to the hospital for the management of Non-healing ulcer of foot, left, with unspecified severity ([address]    The patient reports to the hospital with complaint of worsening left foot wound. Patient reports that he had a chronic left foot wound was under the care of Dr. Osmani Gibson with podiatry. He also endorses a fracture to his left great toe that is not new.  He states that his wound had previously closed and that approximately 7-10 days ago he noticed increased redness, swelling and an open area to his left foot. He was prescribed ciprofloxacin 1 week ago and did not see any improvement. He was seen by Dr. Carlos Vyas with podiatry who encouraged him to come to the ER for further evaluation. The patient endorses neuropathy to his bilateral feet, he does report some pain to his left foot that he describes as dull, intermittent and moderate in intensity. It is aggravated with movement. No definitive alleviating factors. He denies fever, chills, nausea or vomiting. No additional symptomology. He has past medical history that includes type 2 diabetes, CKD and RAÚL with nightly BiPAP use.     Creatinine 1.51, WBC 11.1, glucose 280, .1, sed rate 86.\"    Review of Systems:     Constitutional:  negative for chills, fevers, sweats  Respiratory:  negative for cough, dyspnea on exertion, shortness of breath, wheezing  Cardiovascular:  negative for chest pain, chest pressure/discomfort, palpitations  Gastrointestinal:  negative for abdominal pain, constipation, diarrhea, nausea, vomiting  Neurological:  negative for dizziness, headache    Medications:      Allergies:  No Known Allergies    Current Meds:   Scheduled Meds:    ampicillin-sulbactam  3 g Intravenous Q6H    insulin glargine  40 Units Subcutaneous BID    insulin lispro  0-12 Units Subcutaneous TID WC    insulin lispro  0-6 Units Subcutaneous Nightly    allopurinol  300 mg Oral Daily    gabapentin  300 mg Oral BID    magnesium oxide  400 mg Oral Daily    therapeutic multivitamin-minerals  1 tablet Oral Daily    sodium chloride flush  10 mL Intravenous 2 times per day    heparin (porcine)  5,000 Units Subcutaneous 3 times per day     Continuous Infusions:    dextrose       PRN Meds: oxyCODONE **OR** oxyCODONE, LORazepam, melatonin, sodium chloride flush, potassium chloride **OR** potassium alternative oral replacement **OR** potassium chloride, magnesium sulfate, acetaminophen **OR** acetaminophen, polyethylene glycol, promethazine **OR** ondansetron, nicotine, glucose, dextrose, glucagon (rDNA), dextrose, morphine    Data:     Past Medical History:   has a past medical history of Chronic kidney disease, Diabetes mellitus ([facility] [facility] Diabetic neuropathy ([facility] [address] History of kidney stones, and Sleep apnea. Social History:   reports that he has never smoked. He has never used smokeless tobacco. He reports current alcohol use. He reports that he does not use drugs. Family History:   Family History   Problem Relation Age of Onset    Heart Disease Mother     Heart Disease Father        Vitals:  /64   Pulse 70   Temp 99 °F (37.2 °C) (Oral)   Resp 16   Ht 6' 4\" (1.93 m)   Wt (!) 365 lb (165.6 kg)   SpO2 92%   BMI 44.43 kg/m²   Temp (24hrs), Av.6 °F (37 °C), Min:98.2 °F (36.8 °C), Max:99 °F (37.2 °C)    Recent Labs     10/24/20  1145 10/24/20  1614 10/24/20  2103 10/25/20  0708   POCGLU 205* 239* 211* 142*       I/O (24Hr):     Intake/Output Summary (Last 24 hours) at 10/25/2020 1120  Last data filed at 10/25/2020 0308  Gross per 24 hour   Intake 240 ml   Output 1875 ml   Net -1635 ml       Labs:  Hematology:  Recent Labs     10/23/20  0540 10/24/20  0517   WBC 7.8 8.0   RBC 4.36 4.26   HGB 12.8* 12.9*   HCT 40.0* 39.0*   MCV 91.7 91.5   MCH 29.4 30.3   MCHC 32.0 33.1   RDW 11.9 11.9    321   MPV 9.0 8.6   CRP 94.2*  --      Chemistry:  Recent Labs     10/23/20  0540 10/24/20  0517    136   K 4.3 4.1    99   CO2 27 28   GLUCOSE 211* 156*   BUN 19 17   CREATININE 1.31* 1.30*   ANIONGAP 8* 9   LABGLOM 56* 56*   GFRAA >60 >60   CALCIUM 9.7 9.5     Recent Labs     10/24/20  0014 10/24/20  0632 10/24/20  1145 10/24/20  1614 10/24/20  2103 10/25/20  0708   POCGLU 219* 145* 205* 239* 211* 142*     ABG:No results found for: POCPH, PHART, PH, POCPCO2, TBN3AXB, PCO2, POCPO2, PO2ART, PO2, POCHCO3, ZCE9QJZ, HCO3, NBEA, PBEA, BEART, BE, THGBART, THB, PNW5KPN, LAYC5IDN, D4ZKCCPR, O2SAT, FIO2  Lab Results   Component Value Date/Time    SPECIAL NOT REPORTED 10/21/2020 10:16 AM     Lab Results   Component Value Date/Time    CULTURE ENTEROCOCCUS FAECALIS LIGHT GROWTH (A) 10/21/2020 10:16 AM    CULTURE NORMAL CHHAYA 10/21/2020 10:16 AM    CULTURE FINEGOLDIA MAGNA MODERATE GROWTH (A) 10/21/2020 10:16 AM       Radiology:  Raj Waters Foot Left (min 3 Views)    Result Date: 10/19/2020  Abnormality of the base of the 1st proximal phalanx. This appearance could be caused by chronic infectious process or fracture. MRI may be helpful for better characterization     Mri Foot Left Wo Contrast    Result Date: 10/20/2020  1. Plantar ulceration at the level of the 1st MTP joint with large focal underlying soft tissue abnormality measuring 4.9 x 2.1 x 4.3 cm. Phlegmonous change is felt most likely. Infectious adventitial bursitis is an alternate consideration. 2. Flexor hallucis longus tenosynovitis and partial thickness tearing. The tendon sheath appears to communicate with the plantar soft tissue collection raising concern for infectious tenosynovitis. Additionally there is tenosynovitis of the proximal flexor digitorum longus tendon sheaths which may also be infectious. 3. Joint effusion/synovitis at the 1st MTP joint with associated marrow signal abnormality. Septic arthritis and osteomyelitis should be considered. 4. Marrow signal abnormality of the 1st distal phalanx may reflect early osteomyelitis.   Phlegmonous change appears to extend along the plantar aspect of the great toe.     ekg today shows inferior q waves III, avf and poor r wave progression anteriorly, suggestive of possible old mi-which he denies; the ekg is basically same as from jan 2016 ekg     Physical Examination:        General appearance:  alert, cooperative and no distress  Mental Status:  oriented to person, place and time and normal affect  Lungs:  clear to auscultation bilaterally, normal effort  Heart:  regular rate and rhythm, no murmur  Abdomen: soft, nontender, nondistended, normal bowel sounds, no masses, hepatomegaly, splenomegaly; obese  Extremities:  no edema, redness, tenderness in the calves  Skin:  See pics right foot    Assessment:        Hospital Problems           Last Modified POA    * (Principal) Non-healing ulcer of foot, left, with unspecified severity ([address] 10/19/2020 Yes    Type 2 diabetes mellitus with hyperglycemia, with long-term current use of insulin ([address] 10/19/2020 Yes    Diabetic autonomic neuropathy associated with type 2 diabetes mellitus ([address] 10/19/2020 Yes    Obstructive sleep apnea 10/19/2020 Yes    Class 3 severe obesity due to excess calories with serious comorbidity and body mass index (BMI) of 45.0 to 49.9 in adult [facility]) 10/19/2020 Yes    CKD (chronic kidney disease) stage 3, GFR 30-59 ml/min 10/20/2020 Yes    Sepsis ([address] 10/22/2020 Yes          Plan:        Dc tele  Increased lantus to try and improve glucose levels to promote healing  Cont home cpap  Placed on iv unasyn per ID  Dc planning to snf once arrangements made      Lavern File Blood, DO  10/25/2020  11:20 AM

## 2020-10-25 NOTE — PLAN OF CARE
Problem: Falls - Risk of:  Goal: Will remain free from falls  Description: Will remain free from falls  Outcome: Ongoing     Problem: Falls - Risk of:  Goal: Absence of physical injury  Description: Absence of physical injury  Outcome: Ongoing     Problem: Infection:  Goal: Will remain free from infection  Description: Will remain free from infection  Outcome: Ongoing     Problem: Safety:  Goal: Free from accidental physical injury  Description: Free from accidental physical injury  Outcome: Ongoing     Problem: Safety:  Goal: Free from intentional harm  Description: Free from intentional harm  Outcome: Ongoing     Problem: Daily Care:  Goal: Daily care needs are met  Description: Daily care needs are met  Outcome: Ongoing     Problem: Pain:  Goal: Patient's pain/discomfort is manageable  Description: Patient's pain/discomfort is manageable  Outcome: Ongoing     Problem: Pain:  Goal: Pain level will decrease  Description: Pain level will decrease  Outcome: Ongoing

## 2020-10-26 ENCOUNTER — APPOINTMENT (OUTPATIENT)
Dept: INTERVENTIONAL RADIOLOGY/VASCULAR | Age: 60
DRG: 854 | End: 2020-10-26
Payer: COMMERCIAL

## 2020-10-26 LAB
CULTURE: ABNORMAL
CULTURE: ABNORMAL
DIRECT EXAM: ABNORMAL
DIRECT EXAM: ABNORMAL
GLUCOSE BLD-MCNC: 181 MG/DL (ref 75–110)
GLUCOSE BLD-MCNC: 196 MG/DL (ref 75–110)
GLUCOSE BLD-MCNC: 225 MG/DL (ref 75–110)
GLUCOSE BLD-MCNC: 324 MG/DL (ref 75–110)
Lab: ABNORMAL
SPECIMEN DESCRIPTION: ABNORMAL

## 2020-10-26 PROCEDURE — 99232 SBSQ HOSP IP/OBS MODERATE 35: CPT | Performed by: INTERNAL MEDICINE

## 2020-10-26 PROCEDURE — C1751 CATH, INF, PER/CENT/MIDLINE: HCPCS

## 2020-10-26 PROCEDURE — 6370000000 HC RX 637 (ALT 250 FOR IP): Performed by: STUDENT IN AN ORGANIZED HEALTH CARE EDUCATION/TRAINING PROGRAM

## 2020-10-26 PROCEDURE — 2580000003 HC RX 258: Performed by: STUDENT IN AN ORGANIZED HEALTH CARE EDUCATION/TRAINING PROGRAM

## 2020-10-26 PROCEDURE — 36573 INSJ PICC RS&I 5 YR+: CPT | Performed by: RADIOLOGY

## 2020-10-26 PROCEDURE — 6360000002 HC RX W HCPCS: Performed by: INTERNAL MEDICINE

## 2020-10-26 PROCEDURE — 82947 ASSAY GLUCOSE BLOOD QUANT: CPT

## 2020-10-26 PROCEDURE — 2580000003 HC RX 258: Performed by: INTERNAL MEDICINE

## 2020-10-26 PROCEDURE — 1200000000 HC SEMI PRIVATE

## 2020-10-26 PROCEDURE — 6360000002 HC RX W HCPCS: Performed by: STUDENT IN AN ORGANIZED HEALTH CARE EDUCATION/TRAINING PROGRAM

## 2020-10-26 PROCEDURE — 02HV33Z INSERTION OF INFUSION DEVICE INTO SUPERIOR VENA CAVA, PERCUTANEOUS APPROACH: ICD-10-PCS | Performed by: RADIOLOGY

## 2020-10-26 PROCEDURE — 2580000003 HC RX 258: Performed by: RADIOLOGY

## 2020-10-26 PROCEDURE — 6370000000 HC RX 637 (ALT 250 FOR IP): Performed by: INTERNAL MEDICINE

## 2020-10-26 RX ORDER — SODIUM CHLORIDE 0.9 % (FLUSH) 0.9 %
10 SYRINGE (ML) INJECTION PRN
Status: DISCONTINUED | OUTPATIENT
Start: 2020-10-26 | End: 2020-10-29 | Stop reason: HOSPADM

## 2020-10-26 RX ORDER — SODIUM CHLORIDE 0.9 % (FLUSH) 0.9 %
10 SYRINGE (ML) INJECTION EVERY 12 HOURS SCHEDULED
Status: DISCONTINUED | OUTPATIENT
Start: 2020-10-26 | End: 2020-10-29 | Stop reason: HOSPADM

## 2020-10-26 RX ADMIN — OXYCODONE HYDROCHLORIDE 10 MG: 5 TABLET ORAL at 03:58

## 2020-10-26 RX ADMIN — SODIUM CHLORIDE, PRESERVATIVE FREE 10 ML: 5 INJECTION INTRAVENOUS at 20:31

## 2020-10-26 RX ADMIN — MULTIPLE VITAMINS W/ MINERALS TAB 1 TABLET: TAB at 08:04

## 2020-10-26 RX ADMIN — GABAPENTIN 300 MG: 300 CAPSULE ORAL at 08:04

## 2020-10-26 RX ADMIN — OXYCODONE HYDROCHLORIDE 10 MG: 5 TABLET ORAL at 17:24

## 2020-10-26 RX ADMIN — MAGNESIUM GLUCONATE 500 MG ORAL TABLET 400 MG: 500 TABLET ORAL at 08:04

## 2020-10-26 RX ADMIN — HEPARIN SODIUM 5000 UNITS: 5000 INJECTION INTRAVENOUS; SUBCUTANEOUS at 14:42

## 2020-10-26 RX ADMIN — INSULIN GLARGINE 40 UNITS: 100 INJECTION, SOLUTION SUBCUTANEOUS at 08:05

## 2020-10-26 RX ADMIN — ACETAMINOPHEN 650 MG: 325 TABLET ORAL at 18:42

## 2020-10-26 RX ADMIN — INSULIN GLARGINE 40 UNITS: 100 INJECTION, SOLUTION SUBCUTANEOUS at 22:37

## 2020-10-26 RX ADMIN — SODIUM CHLORIDE 3 G: 900 INJECTION INTRAVENOUS at 14:42

## 2020-10-26 RX ADMIN — HEPARIN SODIUM 5000 UNITS: 5000 INJECTION INTRAVENOUS; SUBCUTANEOUS at 06:00

## 2020-10-26 RX ADMIN — INSULIN LISPRO 2 UNITS: 100 INJECTION, SOLUTION INTRAVENOUS; SUBCUTANEOUS at 08:05

## 2020-10-26 RX ADMIN — GABAPENTIN 300 MG: 300 CAPSULE ORAL at 20:32

## 2020-10-26 RX ADMIN — OXYCODONE HYDROCHLORIDE 10 MG: 5 TABLET ORAL at 22:38

## 2020-10-26 RX ADMIN — Medication 10 ML: at 20:31

## 2020-10-26 RX ADMIN — INSULIN LISPRO 8 UNITS: 100 INJECTION, SOLUTION INTRAVENOUS; SUBCUTANEOUS at 17:24

## 2020-10-26 RX ADMIN — SODIUM CHLORIDE 3 G: 900 INJECTION INTRAVENOUS at 08:00

## 2020-10-26 RX ADMIN — OXYCODONE HYDROCHLORIDE 10 MG: 5 TABLET ORAL at 12:35

## 2020-10-26 RX ADMIN — OXYCODONE HYDROCHLORIDE 10 MG: 5 TABLET ORAL at 08:04

## 2020-10-26 RX ADMIN — HEPARIN SODIUM 5000 UNITS: 5000 INJECTION INTRAVENOUS; SUBCUTANEOUS at 22:36

## 2020-10-26 RX ADMIN — SODIUM CHLORIDE 3 G: 900 INJECTION INTRAVENOUS at 20:31

## 2020-10-26 RX ADMIN — SODIUM CHLORIDE 3 G: 900 INJECTION INTRAVENOUS at 02:58

## 2020-10-26 RX ADMIN — ALLOPURINOL 300 MG: 300 TABLET ORAL at 08:04

## 2020-10-26 RX ADMIN — INSULIN LISPRO 2 UNITS: 100 INJECTION, SOLUTION INTRAVENOUS; SUBCUTANEOUS at 12:34

## 2020-10-26 RX ADMIN — SODIUM CHLORIDE, PRESERVATIVE FREE 10 ML: 5 INJECTION INTRAVENOUS at 08:02

## 2020-10-26 RX ADMIN — INSULIN LISPRO 2 UNITS: 100 INJECTION, SOLUTION INTRAVENOUS; SUBCUTANEOUS at 22:36

## 2020-10-26 ASSESSMENT — PAIN DESCRIPTION - PROGRESSION
CLINICAL_PROGRESSION: NOT CHANGED

## 2020-10-26 ASSESSMENT — PAIN DESCRIPTION - FREQUENCY
FREQUENCY: INTERMITTENT

## 2020-10-26 ASSESSMENT — PAIN SCALES - GENERAL
PAINLEVEL_OUTOF10: 7
PAINLEVEL_OUTOF10: 7
PAINLEVEL_OUTOF10: 3
PAINLEVEL_OUTOF10: 3
PAINLEVEL_OUTOF10: 7

## 2020-10-26 ASSESSMENT — PAIN DESCRIPTION - PAIN TYPE
TYPE: SURGICAL PAIN

## 2020-10-26 ASSESSMENT — PAIN DESCRIPTION - DESCRIPTORS
DESCRIPTORS: ACHING;DISCOMFORT

## 2020-10-26 ASSESSMENT — PAIN - FUNCTIONAL ASSESSMENT
PAIN_FUNCTIONAL_ASSESSMENT: ACTIVITIES ARE NOT PREVENTED

## 2020-10-26 ASSESSMENT — PAIN DESCRIPTION - LOCATION
LOCATION: FOOT

## 2020-10-26 ASSESSMENT — PAIN DESCRIPTION - ORIENTATION
ORIENTATION: LEFT

## 2020-10-26 ASSESSMENT — PAIN DESCRIPTION - ONSET
ONSET: ON-GOING

## 2020-10-26 NOTE — PROGRESS NOTES
swelling and an open area to his left foot. He was prescribed ciprofloxacin 1 week ago and did not see any improvement. He was seen by Dr. Iva Benavides with podiatry who encouraged him to come to the ER for further evaluation. The patient endorses neuropathy to his bilateral feet, he does report some pain to his left foot that he describes as dull, intermittent and moderate in intensity. It is aggravated with movement. No definitive alleviating factors. He denies fever, chills, nausea or vomiting. No additional symptomology. He has past medical history that includes type 2 diabetes, CKD and RAÚL with nightly BiPAP use.     Creatinine 1.51, WBC 11.1, glucose 280, .1, sed rate 86.\"    Review of Systems:     Constitutional:  negative for chills, fevers, sweats  Respiratory:  negative for cough, dyspnea on exertion, shortness of breath, wheezing  Cardiovascular:  negative for chest pain, chest pressure/discomfort, palpitations  Gastrointestinal:  negative for abdominal pain, constipation, diarrhea, nausea, vomiting  Neurological:  negative for dizziness, headache    Medications:      Allergies:  No Known Allergies    Current Meds:   Scheduled Meds:    ampicillin-sulbactam  3 g Intravenous Q6H    insulin glargine  40 Units Subcutaneous BID    insulin lispro  0-12 Units Subcutaneous TID WC    insulin lispro  0-6 Units Subcutaneous Nightly    allopurinol  300 mg Oral Daily    gabapentin  300 mg Oral BID    magnesium oxide  400 mg Oral Daily    therapeutic multivitamin-minerals  1 tablet Oral Daily    sodium chloride flush  10 mL Intravenous 2 times per day    heparin (porcine)  5,000 Units Subcutaneous 3 times per day     Continuous Infusions:    dextrose       PRN Meds: oxyCODONE **OR** oxyCODONE, LORazepam, melatonin, sodium chloride flush, potassium chloride **OR** potassium alternative oral replacement **OR** potassium chloride, magnesium sulfate, acetaminophen **OR** acetaminophen, polyethylene glycol, promethazine **OR** ondansetron, nicotine, glucose, dextrose, glucagon (rDNA), dextrose, morphine    Data:     Past Medical History:   has a past medical history of Chronic kidney disease, Diabetes mellitus ([facility] [address] Diabetic neuropathy ([facility] [address] History of kidney stones, and Sleep apnea. Social History:   reports that he has never smoked. He has never used smokeless tobacco. He reports current alcohol use. He reports that he does not use drugs. Family History:   Family History   Problem Relation Age of Onset    Heart Disease Mother     Heart Disease Father        Vitals:  BP (!) 132/47   Pulse 78   Temp 98.2 °F (36.8 °C) (Oral)   Resp 15   Ht 6' 4\" (1.93 m)   Wt (!) 363 lb 3.2 oz (164.7 kg)   SpO2 91%   BMI 44.21 kg/m²   Temp (24hrs), Av °F (36.7 °C), Min:97.7 °F (36.5 °C), Max:98.2 °F (36.8 °C)    Recent Labs     10/25/20  1627 10/25/20  2034 10/26/20  0615 10/26/20  1136   POCGLU 261* 225* 196* 181*       I/O (24Hr):     Intake/Output Summary (Last 24 hours) at 10/26/2020 1324  Last data filed at 10/26/2020 1236  Gross per 24 hour   Intake 440 ml   Output 1825 ml   Net -1385 ml       Labs:  Hematology:  Recent Labs     10/24/20  0517   WBC 8.0   RBC 4.26   HGB 12.9*   HCT 39.0*   MCV 91.5   MCH 30.3   MCHC 33.1   RDW 11.9      MPV 8.6     Chemistry:  Recent Labs     10/24/20  0517      K 4.1   CL 99   CO2 28   GLUCOSE 156*   BUN 17   CREATININE 1.30*   ANIONGAP 9   LABGLOM 56*   GFRAA >60   CALCIUM 9.5     Recent Labs     10/25/20  0708 10/25/20  1143 10/25/20  1627 10/25/20  2034 10/26/20  0615 10/26/20  1136   POCGLU 142* 175* 261* 225* 196* 181*     ABG:No results found for: POCPH, PHART, PH, POCPCO2, UZO3KYS, PCO2, POCPO2, PO2ART, PO2, POCHCO3, VYQ9FTT, HCO3, NBEA, PBEA, BEART, BE, THGBART, THB, BMB3KJS, DUKT7HTS, C2ULZVNI, O2SAT, FIO2  Lab Results   Component Value Date/Time    SPECIAL NOT REPORTED 10/21/2020 10:16 AM     Lab Results   Component Value Date/Time    CULTURE ENTEROCOCCUS FAECALIS LIGHT GROWTH (A) 10/21/2020 10:16 AM    CULTURE NORMAL CHHAYA 10/21/2020 10:16 AM    CULTURE FINEGOLDIA MAGNA MODERATE GROWTH (A) 10/21/2020 10:16 AM       Radiology:  Zeus Nieves Foot Left (min 3 Views)    Result Date: 10/19/2020  Abnormality of the base of the 1st proximal phalanx. This appearance could be caused by chronic infectious process or fracture. MRI may be helpful for better characterization     Mri Foot Left Wo Contrast    Result Date: 10/20/2020  1. Plantar ulceration at the level of the 1st MTP joint with large focal underlying soft tissue abnormality measuring 4.9 x 2.1 x 4.3 cm. Phlegmonous change is felt most likely. Infectious adventitial bursitis is an alternate consideration. 2. Flexor hallucis longus tenosynovitis and partial thickness tearing. The tendon sheath appears to communicate with the plantar soft tissue collection raising concern for infectious tenosynovitis. Additionally there is tenosynovitis of the proximal flexor digitorum longus tendon sheaths which may also be infectious. 3. Joint effusion/synovitis at the 1st MTP joint with associated marrow signal abnormality. Septic arthritis and osteomyelitis should be considered. 4. Marrow signal abnormality of the 1st distal phalanx may reflect early osteomyelitis.   Phlegmonous change appears to extend along the plantar aspect of the great toe.     ekg today shows inferior q waves III, avf and poor r wave progression anteriorly, suggestive of possible old mi-which he denies; the ekg is basically same as from jan 2016 ekg     Physical Examination:        General appearance:  alert, cooperative and no distress  Mental Status:  oriented to person, place and time and normal affect  Lungs:  clear to auscultation bilaterally, normal effort  Heart:  regular rate and rhythm, no murmur  Abdomen:  soft, nontender, nondistended, normal bowel sounds, no masses, hepatomegaly, splenomegaly; obese  Extremities:  no  redness, tenderness in the calves  Skin:  See pics right foot    Assessment:        Hospital Problems           Last Modified POA    * (Principal) Non-healing ulcer of foot, left, with unspecified severity ([address] 10/19/2020 Yes    Type 2 diabetes mellitus with hyperglycemia, with long-term current use of insulin ([address] 10/19/2020 Yes    Diabetic autonomic neuropathy associated with type 2 diabetes mellitus ([address] 10/19/2020 Yes    Obstructive sleep apnea 10/19/2020 Yes    Class 3 severe obesity due to excess calories with serious comorbidity and body mass index (BMI) of 45.0 to 49.9 in adult [facility]) 10/19/2020 Yes    CKD (chronic kidney disease) stage 3, GFR 30-59 ml/min 10/20/2020 Yes    Sepsis ([address] 10/22/2020 Yes          Plan:        Increased lantus to try and improve glucose levels to promote healing  Cont home cpap  Placed on iv unasyn per ID-will need to be on for 6 months  picc line today  D/w Dr Benita Wray to snf once arrangements made      Mariangel Dai DO  10/26/2020  1:24 PM

## 2020-10-26 NOTE — PROGRESS NOTES
Nutrition Assessment     Type and Reason for Visit: Reassess    Nutrition Recommendations/Plan:   1. Continue Carb control diet (4 carb choices)  2. Reorder Freddy BID for wound healing    Nutrition Assessment:  Patient s/p foot surgery on 10/21. Pt. tolerating diet, PO intakes % of meals. Patient has not recieved Freddy wound healing supplement since diet change for surgery. Due to increased nutrient needs for wound healing will reorder Freddy BID. Malnutrition Assessment:  Malnutrition Status: At risk for malnutrition (Comment)    Estimated Daily Nutrient Needs:  Energy (kcal): 8488-5158 (9-11 kcal/kg); Weight Used for Energy Requirements:  Current     Protein (g): 130-150 (0.8-0.9 g/kg); Weight Used for Protein Requirements:  Current        Fluid (ml/day): 1 ml/kcal    Nutrition Related Findings: GI: WDL; Skin: s/p (L) foot surgery (Amputation hallux)      Current Nutrition Therapies:    DIET CARB CONTROL; Carb Control: 4 carb choices (60 gms)/meal    Anthropometric Measures:  · Height: 6' 4\" (193 cm)  · Current Body Wt: 363 lb 1.6 oz (164.7 kg)   · BMI: 44.2    Nutrition Diagnosis:   · Increased nutrient needs related to acute injury/trauma as evidenced by wounds      Nutrition Interventions:   Food and/or Nutrient Delivery:  Continue Current Diet, Start Oral Nutrition Supplement  Nutrition Education/Counseling:  Education not indicated   Coordination of Nutrition Care:  Continued Inpatient Monitoring    Goals:  PO intake % all meals       Nutrition Monitoring and Evaluation:   Food/Nutrient Intake Outcomes:  Food and Nutrient Intake, Supplement Intake  Physical Signs/Symptoms Outcomes:  Biochemical Data, Skin, Weight     Discharge Planning:    Continue Oral Nutrition Supplement     Some areas of assessment may be incomplete due to COVID-19 precautions.     Arabella Boo RDN, KENNAN  RD Office Phone: [phone]

## 2020-10-26 NOTE — PLAN OF CARE
Problem: Falls - Risk of:  Goal: Will remain free from falls  Description: Will remain free from falls  Outcome: Ongoing     Problem: Falls - Risk of:  Goal: Absence of physical injury  Description: Absence of physical injury  Outcome: Ongoing     Problem: Infection:  Goal: Will remain free from infection  Description: Will remain free from infection  Outcome: Ongoing     Problem: Safety:  Goal: Free from accidental physical injury  Description: Free from accidental physical injury  Outcome: Ongoing     Problem: Safety:  Goal: Free from intentional harm  Description: Free from intentional harm  Outcome: Ongoing     Problem: Daily Care:  Goal: Daily care needs are met  Description: Daily care needs are met  Outcome: Ongoing     Problem: Pain:  Goal: Patient's pain/discomfort is manageable  Description: Patient's pain/discomfort is manageable  Outcome: Ongoing     Problem: Pain:  Goal: Pain level will decrease  Description: Pain level will decrease  Outcome: Ongoing     Problem: Pain:  Goal: Control of acute pain  Description: Control of acute pain  Outcome: Ongoing     Problem: Pain:  Goal: Control of chronic pain  Description: Control of chronic pain  Outcome: Ongoing     Problem: Skin Integrity:  Goal: Skin integrity will stabilize  Description: Skin integrity will stabilize  Outcome: Ongoing     Problem: Discharge Planning:  Goal: Patients continuum of care needs are met  Description: Patients continuum of care needs are met  Outcome: Ongoing     Problem: Nutrition  Goal: Optimal nutrition therapy  Outcome: Ongoing     Problem: Discharge Planning:  Goal: Discharged to appropriate level of care  Description: Discharged to appropriate level of care  Outcome: Ongoing     Problem: Serum Glucose Level - Abnormal:  Goal: Ability to maintain appropriate glucose levels will improve  Description: Ability to maintain appropriate glucose levels will improve  Outcome: Ongoing     Problem: Sensory Perception - Impaired:  Goal: Ability to maintain a stable neurologic state will improve  Description: Ability to maintain a stable neurologic state will improve  Outcome: Ongoing

## 2020-10-26 NOTE — CARE COORDINATION
Await call back from [facility] [place]. to determine if that can accept patient on Unasyn Q6. Ricardo freedman.

## 2020-10-26 NOTE — PLAN OF CARE
Problem: Falls - Risk of:  Goal: Will remain free from falls  Description: Will remain free from falls  10/26/2020 1154 by Pardeep Cavazos RN  Outcome: Ongoing  10/26/2020 0105 by Radha Mahmood RN  Outcome: Ongoing  Goal: Absence of physical injury  Description: Absence of physical injury  10/26/2020 1154 by Pardeep Cavazos RN  Outcome: Ongoing  10/26/2020 0105 by Radha Mahmood RN  Outcome: Ongoing     Problem: Infection:  Goal: Will remain free from infection  Description: Will remain free from infection  10/26/2020 1154 by Pardeep Cavazos RN  Outcome: Ongoing  10/26/2020 0105 by Radha Mahmood RN  Outcome: Ongoing     Problem: Safety:  Goal: Free from accidental physical injury  Description: Free from accidental physical injury  10/26/2020 1154 by Pardeep Cavazos RN  Outcome: Ongoing  10/26/2020 0105 by Radha Mahmood RN  Outcome: Ongoing  Goal: Free from intentional harm  Description: Free from intentional harm  10/26/2020 1154 by Pardeep Cavazos RN  Outcome: Ongoing  10/26/2020 0105 by Radha Mahmood RN  Outcome: Ongoing     Problem: Daily Care:  Goal: Daily care needs are met  Description: Daily care needs are met  10/26/2020 1154 by Pardeep Cavazos RN  Outcome: Ongoing  10/26/2020 0105 by Radha Mahmood RN  Outcome: Ongoing     Problem: Pain:  Goal: Patient's pain/discomfort is manageable  Description: Patient's pain/discomfort is manageable  10/26/2020 1154 by Pardeep Cavazos RN  Outcome: Ongoing  10/26/2020 0105 by Radha Mahmood RN  Outcome: Ongoing  Goal: Pain level will decrease  Description: Pain level will decrease  10/26/2020 1154 by Pardeep Cavazos RN  Outcome: Ongoing  10/26/2020 0105 by Radha Mahmood RN  Outcome: Ongoing  Goal: Control of acute pain  Description: Control of acute pain  10/26/2020 1154 by Pardeep Cavazos RN  Outcome: Ongoing  10/26/2020 0105 by Radha Mahmood RN  Outcome: Ongoing  Goal: Control of chronic pain  Description: Control of chronic pain  10/26/2020 1154 by Shreyas Holbrook RN  Outcome: Ongoing  10/26/2020 0105 by Justin Mg RN  Outcome: Ongoing     Problem: Skin Integrity:  Goal: Skin integrity will stabilize  Description: Skin integrity will stabilize  10/26/2020 1154 by Shreyas Holbrook RN  Outcome: Ongoing  10/26/2020 0105 by Justin Mg RN  Outcome: Ongoing     Problem: Discharge Planning:  Goal: Patients continuum of care needs are met  Description: Patients continuum of care needs are met  10/26/2020 1154 by Shreyas Holbrook RN  Outcome: Ongoing  10/26/2020 0105 by Justin Mg RN  Outcome: Ongoing     Problem: Nutrition  Goal: Optimal nutrition therapy  10/26/2020 1154 by Shreyas Holbrook RN  Outcome: Ongoing  10/26/2020 0105 by Justin Mg RN  Outcome: Ongoing     Problem: Discharge Planning:  Goal: Discharged to appropriate level of care  Description: Discharged to appropriate level of care  10/26/2020 1154 by Shreyas Holbrook RN  Outcome: Ongoing  10/26/2020 0105 by Justin Mg RN  Outcome: Ongoing     Problem: Serum Glucose Level - Abnormal:  Goal: Ability to maintain appropriate glucose levels will improve  Description: Ability to maintain appropriate glucose levels will improve  10/26/2020 1154 by Shreyas Holbrook RN  Outcome: Ongoing  10/26/2020 0105 by Justin Mg RN  Outcome: Ongoing     Problem: Sensory Perception - Impaired:  Goal: Ability to maintain a stable neurologic state will improve  Description: Ability to maintain a stable neurologic state will improve  10/26/2020 1154 by Shreyas Holbrook RN  Outcome: Ongoing  10/26/2020 0105 by Justin Mg RN  Outcome: Ongoing

## 2020-10-26 NOTE — PROGRESS NOTES
Occupational Therapy    DATE: 10/26/2020    NAME: Russell Collier  MRN: [id number]   : 1960    [facility]  Occupational Therapy Not Seen Note    Patient not available for Occupational Therapy due to:    [] Testing:    [] Hemodialysis    [] Cancelled by RN:    []Refusal by Patient:    [] Surgery:     [] Intubation:     [] Pain Medication:    [] Sedation:     [] Spine Precautions :    [] Medical Instability:    [x] Other: Going for PICC placement this ABE Keen/L

## 2020-10-27 LAB
ABSOLUTE EOS #: 0.3 K/UL (ref 0–0.44)
ABSOLUTE IMMATURE GRANULOCYTE: 0.14 K/UL (ref 0–0.3)
ABSOLUTE LYMPH #: 2.12 K/UL (ref 1.1–3.7)
ABSOLUTE MONO #: 0.62 K/UL (ref 0.1–1.2)
ANION GAP SERPL CALCULATED.3IONS-SCNC: 7 MMOL/L (ref 9–17)
BASOPHILS # BLD: 1 % (ref 0–2)
BASOPHILS ABSOLUTE: 0.06 K/UL (ref 0–0.2)
BUN BLDV-MCNC: 20 MG/DL (ref 8–23)
BUN/CREAT BLD: 15 (ref 9–20)
CALCIUM SERPL-MCNC: 10 MG/DL (ref 8.6–10.4)
CHLORIDE BLD-SCNC: 102 MMOL/L (ref 98–107)
CO2: 28 MMOL/L (ref 20–31)
CREAT SERPL-MCNC: 1.31 MG/DL (ref 0.7–1.2)
DIFFERENTIAL TYPE: ABNORMAL
EOSINOPHILS RELATIVE PERCENT: 4 % (ref 1–4)
GFR AFRICAN AMERICAN: >60 ML/MIN
GFR NON-AFRICAN AMERICAN: 56 ML/MIN
GFR SERPL CREATININE-BSD FRML MDRD: ABNORMAL ML/MIN/{1.73_M2}
GFR SERPL CREATININE-BSD FRML MDRD: ABNORMAL ML/MIN/{1.73_M2}
GLUCOSE BLD-MCNC: 166 MG/DL (ref 75–110)
GLUCOSE BLD-MCNC: 187 MG/DL (ref 70–99)
GLUCOSE BLD-MCNC: 192 MG/DL (ref 75–110)
GLUCOSE BLD-MCNC: 234 MG/DL (ref 75–110)
GLUCOSE BLD-MCNC: 287 MG/DL (ref 75–110)
HCT VFR BLD CALC: 41.1 % (ref 40.7–50.3)
HEMOGLOBIN: 13.2 G/DL (ref 13–17)
IMMATURE GRANULOCYTES: 2 %
LYMPHOCYTES # BLD: 31 % (ref 24–43)
MCH RBC QN AUTO: 29.7 PG (ref 25.2–33.5)
MCHC RBC AUTO-ENTMCNC: 32.1 G/DL (ref 28.4–34.8)
MCV RBC AUTO: 92.4 FL (ref 82.6–102.9)
MONOCYTES # BLD: 9 % (ref 3–12)
NRBC AUTOMATED: 0 PER 100 WBC
PDW BLD-RTO: 12.1 % (ref 11.8–14.4)
PLATELET # BLD: 315 K/UL (ref 138–453)
PLATELET ESTIMATE: ABNORMAL
PMV BLD AUTO: 8.6 FL (ref 8.1–13.5)
POTASSIUM SERPL-SCNC: 4.4 MMOL/L (ref 3.7–5.3)
RBC # BLD: 4.45 M/UL (ref 4.21–5.77)
RBC # BLD: ABNORMAL 10*6/UL
SEG NEUTROPHILS: 53 % (ref 36–65)
SEGMENTED NEUTROPHILS ABSOLUTE COUNT: 3.65 K/UL (ref 1.5–8.1)
SODIUM BLD-SCNC: 137 MMOL/L (ref 135–144)
WBC # BLD: 6.9 K/UL (ref 3.5–11.3)
WBC # BLD: ABNORMAL 10*3/UL

## 2020-10-27 PROCEDURE — 99232 SBSQ HOSP IP/OBS MODERATE 35: CPT | Performed by: INTERNAL MEDICINE

## 2020-10-27 PROCEDURE — 80048 BASIC METABOLIC PNL TOTAL CA: CPT

## 2020-10-27 PROCEDURE — 6370000000 HC RX 637 (ALT 250 FOR IP): Performed by: STUDENT IN AN ORGANIZED HEALTH CARE EDUCATION/TRAINING PROGRAM

## 2020-10-27 PROCEDURE — 2580000003 HC RX 258: Performed by: INTERNAL MEDICINE

## 2020-10-27 PROCEDURE — 97530 THERAPEUTIC ACTIVITIES: CPT

## 2020-10-27 PROCEDURE — 6370000000 HC RX 637 (ALT 250 FOR IP): Performed by: INTERNAL MEDICINE

## 2020-10-27 PROCEDURE — 36415 COLL VENOUS BLD VENIPUNCTURE: CPT

## 2020-10-27 PROCEDURE — 94761 N-INVAS EAR/PLS OXIMETRY MLT: CPT

## 2020-10-27 PROCEDURE — 2580000003 HC RX 258: Performed by: RADIOLOGY

## 2020-10-27 PROCEDURE — 85025 COMPLETE CBC W/AUTO DIFF WBC: CPT

## 2020-10-27 PROCEDURE — 82947 ASSAY GLUCOSE BLOOD QUANT: CPT

## 2020-10-27 PROCEDURE — 6360000002 HC RX W HCPCS: Performed by: STUDENT IN AN ORGANIZED HEALTH CARE EDUCATION/TRAINING PROGRAM

## 2020-10-27 PROCEDURE — 6360000002 HC RX W HCPCS: Performed by: INTERNAL MEDICINE

## 2020-10-27 PROCEDURE — 1200000000 HC SEMI PRIVATE

## 2020-10-27 PROCEDURE — 97110 THERAPEUTIC EXERCISES: CPT

## 2020-10-27 PROCEDURE — 2580000003 HC RX 258: Performed by: STUDENT IN AN ORGANIZED HEALTH CARE EDUCATION/TRAINING PROGRAM

## 2020-10-27 RX ORDER — INSULIN GLARGINE 100 [IU]/ML
50 INJECTION, SOLUTION SUBCUTANEOUS 2 TIMES DAILY
Status: DISCONTINUED | OUTPATIENT
Start: 2020-10-27 | End: 2020-10-27

## 2020-10-27 RX ORDER — INSULIN GLARGINE 100 [IU]/ML
45 INJECTION, SOLUTION SUBCUTANEOUS 2 TIMES DAILY
Status: DISCONTINUED | OUTPATIENT
Start: 2020-10-27 | End: 2020-10-29 | Stop reason: HOSPADM

## 2020-10-27 RX ADMIN — ENOXAPARIN SODIUM 40 MG: 40 INJECTION SUBCUTANEOUS at 21:28

## 2020-10-27 RX ADMIN — OXYCODONE HYDROCHLORIDE 10 MG: 5 TABLET ORAL at 08:13

## 2020-10-27 RX ADMIN — OXYCODONE HYDROCHLORIDE 10 MG: 5 TABLET ORAL at 17:00

## 2020-10-27 RX ADMIN — INSULIN LISPRO 2 UNITS: 100 INJECTION, SOLUTION INTRAVENOUS; SUBCUTANEOUS at 12:02

## 2020-10-27 RX ADMIN — HEPARIN SODIUM 5000 UNITS: 5000 INJECTION INTRAVENOUS; SUBCUTANEOUS at 14:08

## 2020-10-27 RX ADMIN — OXYCODONE HYDROCHLORIDE 10 MG: 5 TABLET ORAL at 12:47

## 2020-10-27 RX ADMIN — INSULIN GLARGINE 40 UNITS: 100 INJECTION, SOLUTION SUBCUTANEOUS at 08:12

## 2020-10-27 RX ADMIN — ALLOPURINOL 300 MG: 300 TABLET ORAL at 08:13

## 2020-10-27 RX ADMIN — HEPARIN SODIUM 5000 UNITS: 5000 INJECTION INTRAVENOUS; SUBCUTANEOUS at 05:40

## 2020-10-27 RX ADMIN — GABAPENTIN 300 MG: 300 CAPSULE ORAL at 08:13

## 2020-10-27 RX ADMIN — Medication 10 ML: at 08:17

## 2020-10-27 RX ADMIN — OXYCODONE HYDROCHLORIDE 10 MG: 5 TABLET ORAL at 21:28

## 2020-10-27 RX ADMIN — MULTIPLE VITAMINS W/ MINERALS TAB 1 TABLET: TAB at 08:13

## 2020-10-27 RX ADMIN — INSULIN GLARGINE 45 UNITS: 100 INJECTION, SOLUTION SUBCUTANEOUS at 21:25

## 2020-10-27 RX ADMIN — SODIUM CHLORIDE, PRESERVATIVE FREE 10 ML: 5 INJECTION INTRAVENOUS at 21:32

## 2020-10-27 RX ADMIN — SODIUM CHLORIDE 3 G: 900 INJECTION INTRAVENOUS at 08:16

## 2020-10-27 RX ADMIN — INSULIN LISPRO 2 UNITS: 100 INJECTION, SOLUTION INTRAVENOUS; SUBCUTANEOUS at 08:12

## 2020-10-27 RX ADMIN — GABAPENTIN 300 MG: 300 CAPSULE ORAL at 21:28

## 2020-10-27 RX ADMIN — SODIUM CHLORIDE 3 G: 900 INJECTION INTRAVENOUS at 03:21

## 2020-10-27 RX ADMIN — SODIUM CHLORIDE 3 G: 900 INJECTION INTRAVENOUS at 15:31

## 2020-10-27 RX ADMIN — MAGNESIUM GLUCONATE 500 MG ORAL TABLET 400 MG: 500 TABLET ORAL at 08:13

## 2020-10-27 RX ADMIN — INSULIN LISPRO 2 UNITS: 100 INJECTION, SOLUTION INTRAVENOUS; SUBCUTANEOUS at 21:25

## 2020-10-27 RX ADMIN — SODIUM CHLORIDE, PRESERVATIVE FREE 10 ML: 5 INJECTION INTRAVENOUS at 08:17

## 2020-10-27 RX ADMIN — SODIUM CHLORIDE 3 G: 900 INJECTION INTRAVENOUS at 21:29

## 2020-10-27 RX ADMIN — Medication 10 ML: at 21:37

## 2020-10-27 RX ADMIN — INSULIN LISPRO 6 UNITS: 100 INJECTION, SOLUTION INTRAVENOUS; SUBCUTANEOUS at 17:51

## 2020-10-27 RX ADMIN — OXYCODONE HYDROCHLORIDE 10 MG: 5 TABLET ORAL at 03:21

## 2020-10-27 ASSESSMENT — PAIN DESCRIPTION - PROGRESSION
CLINICAL_PROGRESSION: NOT CHANGED

## 2020-10-27 ASSESSMENT — PAIN DESCRIPTION - FREQUENCY
FREQUENCY: INTERMITTENT

## 2020-10-27 ASSESSMENT — PAIN DESCRIPTION - ORIENTATION
ORIENTATION: LEFT

## 2020-10-27 ASSESSMENT — PAIN DESCRIPTION - DESCRIPTORS
DESCRIPTORS: ACHING
DESCRIPTORS: ACHING;DISCOMFORT

## 2020-10-27 ASSESSMENT — PAIN DESCRIPTION - LOCATION
LOCATION: FOOT

## 2020-10-27 ASSESSMENT — PAIN SCALES - GENERAL
PAINLEVEL_OUTOF10: 7
PAINLEVEL_OUTOF10: 6
PAINLEVEL_OUTOF10: 7

## 2020-10-27 ASSESSMENT — PAIN DESCRIPTION - PAIN TYPE
TYPE: SURGICAL PAIN

## 2020-10-27 ASSESSMENT — PAIN DESCRIPTION - ONSET
ONSET: ON-GOING

## 2020-10-27 ASSESSMENT — ENCOUNTER SYMPTOMS
ALLERGIC/IMMUNOLOGIC NEGATIVE: 1
RESPIRATORY NEGATIVE: 1
GASTROINTESTINAL NEGATIVE: 1

## 2020-10-27 NOTE — PROGRESS NOTES
Physical Therapy  Facility/Department: [facility] MED SURG  Daily Treatment Note  NAME: Allie Lawrence  : 1960  MRN: [id number]    Date of Service: 10/27/2020    Discharge Recommendations:  Subacute/Skilled Nursing Facility        Assessment   Body structures, Functions, Activity limitations: Decreased functional mobility ; Decreased safe awareness; Increased pain  Assessment: pt progressing toward goals   Activity Tolerance  Activity Tolerance: Patient limited by endurance; Patient limited by fatigue;Patient limited by pain     Patient Diagnosis(es): The primary encounter diagnosis was Diabetic ulcer of left foot associated with type 2 diabetes mellitus, with other ulcer severity, unspecified part of foot ([facility] [address] Diagnoses of Cellulitis of left lower extremity and Acute post-operative pain were also pertinent to this visit. has a past medical history of Chronic kidney disease, Diabetes mellitus ([facility] [address] Diabetic neuropathy ([facility] [address] History of kidney stones, and Sleep apnea. has a past surgical history that includes Lithotripsy (); Appendectomy; Nasal septum surgery; Foot Debridement (Left, 2016); skin split graft (Left, 2016); Foot surgery (Left, 2019); Foot Debridement (Left, 2019); Foot surgery (Left, 2019); Foot surgery (Left, 2019); Foot Debridement (Left, 3/12/2019); other surgical history (Left, 2019); Skin graft (Left, 4/3/2019); Foot Debridement (Left, 2019); Skin graft (Left, 2019); Skin graft (Left, 2019); Toe amputation (Left, 10/21/2020); and IR INSERT PICC VAD W SQ PORT >5 YEARS (10/26/2020).     Restrictions  Restrictions/Precautions  Restrictions/Precautions: Weight Bearing, Fall Risk, General Precautions, Up as Tolerated  Required Braces or Orthoses?: No  Lower Extremity Weight Bearing Restrictions  Left Lower Extremity Weight Bearing: Non Weight Bearing  Required Braces or Orthoses  Right Lower Extremity Brace: (L surgical shoe)  Position Activity Restriction  Other position/activity restrictions: Strict NWB following sx: amputation L hallux  Subjective   General  Chart Reviewed: Yes  Subjective  Subjective: pt laying in bed and states he has been up to the bathroom several times on his own  General Comment  Comments: pt is not willing to get OOB again   Pain Assessment  Pain Assessment: 0-10  Pain Level: 6  Pain Type: Surgical pain  Pain Location: Foot  Pain Orientation: Left  Pain Descriptors: Aching       Orientation     Cognition      Objective         Ambulation  Ambulation?: No     Balance  Posture: Good  Sitting - Static: Good  Sitting - Dynamic: Good  Standing - Static: Fair  Standing - Dynamic: Fair  Exercises  Comments: pt issued HEP for tband ex reviewed all ex with pt.                          G-Code     OutComes Score                                                     AM-PAC Score  AM-PAC Inpatient Mobility Raw Score : 15 (10/27/20 1314)  AM-PAC Inpatient T-Scale Score : 39.45 (10/27/20 1314)  Mobility Inpatient CMS 0-100% Score: 57.7 (10/27/20 1314)  Mobility Inpatient CMS G-Code Modifier : CK (10/27/20 1314)          Goals  Short term goals  Time Frame for Short term goals: 12 visits  Short term goal 1: Independent sit<>stand while maintaining L NWB  Short term goal 2: Ambulate 40 feet with roll walker L LE NWB    Plan    Plan  Times per week: 1-2x/day for 5-6 days/week  Current Treatment Recommendations: Gait Training, Transfer Training, Endurance Training, Safety Education & Training, Equipment Evaluation, Education, & procurement  Safety Devices  Type of devices: Nurse notified, Gait belt, Call light within reach, All fall risk precautions in place, Left in bed  Restraints  Initially in place: No     Therapy Time   Individual Concurrent Group Co-treatment   Time In  1116         Time Out  1128         Minutes  12                 [address], [place]

## 2020-10-27 NOTE — PROGRESS NOTES
Occupational Therapy  Facility/Department: [facility] MED SURG  Daily Treatment Note  NAME: Shoaib Garcia  : 1960  MRN: [id number]    Date of Service: 10/27/2020    Discharge Recommendations:  Subacute/Skilled Nursing Facility       Assessment   Performance deficits / Impairments: Decreased functional mobility ; Decreased ADL status; Decreased strength;Decreased safe awareness;Decreased balance  Prognosis: Good  OT Education: OT Role;Plan of Care;Precautions; Equipment;Transfer Training;Energy Conservation  REQUIRES OT FOLLOW UP: Yes  Activity Tolerance  Activity Tolerance: Patient Tolerated treatment well  Activity Tolerance: limited by inability to maintain NWB  Safety Devices  Safety Devices in place: Yes  Type of devices: Call light within reach; Patient at risk for falls; Left in bed;Nurse notified; Bed alarm in place         Patient Diagnosis(es): The primary encounter diagnosis was Diabetic ulcer of left foot associated with type 2 diabetes mellitus, with other ulcer severity, unspecified part of foot ([facility] [address] Diagnoses of Cellulitis of left lower extremity and Acute post-operative pain were also pertinent to this visit. has a past medical history of Chronic kidney disease, Diabetes mellitus ([address] Diabetic neuropathy ([address] History of kidney stones, and Sleep apnea. has a past surgical history that includes Lithotripsy (); Appendectomy; Nasal septum surgery; Foot Debridement (Left, 2016); skin split graft (Left, 2016); Foot surgery (Left, 2019); Foot Debridement (Left, 2019); Foot surgery (Left, 2019); Foot surgery (Left, 2019); Foot Debridement (Left, 3/12/2019); other surgical history (Left, 2019); Skin graft (Left, 4/3/2019); Foot Debridement (Left, 2019); Skin graft (Left, 2019); Skin graft (Left, 2019);  Toe amputation (Left, 10/21/2020); and IR INSERT PICC VAD W SQ PORT >5 YEARS (10/26/2020). Restrictions  Restrictions/Precautions  Restrictions/Precautions: Weight Bearing, Fall Risk, General Precautions, Up as Tolerated  Required Braces or Orthoses?: No  Lower Extremity Weight Bearing Restrictions  Left Lower Extremity Weight Bearing: Non Weight Bearing  Required Braces or Orthoses  Right Lower Extremity Brace: (L surgical shoe)  Position Activity Restriction  Other position/activity restrictions: Strict NWB following sx: amputation L hallux  Subjective   General  Chart Reviewed: Yes  Patient assessed for rehabilitation services?: Yes  Response to previous treatment: Patient with no complaints from previous session  Family / Caregiver Present: No      Orientation  Orientation  Overall Orientation Status: Within Normal Limits  Objective    ADL  Additional Comments: Patient requested to shower, writer educated patient on weight limit in shower and stated, \"I can just stand on one foot, and i'll be fine. \" Writer stated this is not a safe way to complete ADL's, and d/t previous notes patient unable to maintain NWB status. Patient verbalized understanding after education provided. Balance  Sitting Balance: Independent  Standing Balance: Stand by assistance  Functional Mobility  Functional - Mobility Device: Crutches(axillary crutches)  Activity: To/from bathroom  Assist Level: Minimal assistance  Functional Mobility Comments: Min A to complete functional mobility around room with crutches. Patient did not maintain NWB precautions despite MAX cues and education on importance of maintaining precautions, patient bears weight on heel for support.  Educated patient on use of knee scooter to improve NWB compliance  Bed mobility  Supine to Sit: Supervision  Sit to Supine: Modified independent  Scooting: Independent  Comment: Patient left sitting on EOB  Transfers  Sit to stand: Stand by assistance  Stand to sit: Stand by assistance  Transfer Comments: max cues for safety and to follow WB date.    ABE Plasencia/MELANIE

## 2020-10-27 NOTE — PROGRESS NOTES
Patient weight is 150 kg or greater. For prophylaxis with Enoxaparin, Pharmacy adjusted the dose to account for the patient's increased body weight in accordance with hospital approved protocol. The dose has been changed to 40 mg BID. Please contact pharmacy with any concerns @ [phone]. Thank you.    Oneil Contreras [facility]   10/27/2020 3:16 PM

## 2020-10-27 NOTE — PROGRESS NOTES
[facility]  Progress Note    Simran Garcia  MRN: [id number]  Date: 10/27/2020  LOS: 8     Reason for F/U :   Infected diabetic foot ulcer/osteomyelitis    Impression :   1. Left-sided diabetic foot ulcer with associated infection/osteomyelitis  · Status post great toe amputation and bone biopsy of the first metatarsal 10/21/2020  · Surgical pathology consistent with osteomyelitis  · Culture with Enterococcus faecalis and Feingoldia  2. Diabetes mellitus type 2 with associated neuropathy. 3. Diabetic nephropathy with chronic kidney disease stage III. 4. Obstructive sleep apnea with nightly BiPAP use. 5. Morbid obesity    Recommendations:   · Continue intravenous antimicrobial therapy with Unasyn for 6 weeks through November 30, 2020  · The patient's second choice facility is able to administer the antibiotic every 6 hours. · The patient is now awaiting insurance precertification. · Continue local wound care and the patient is okay for discharge once arrangements have been made. Infection Control Recommendations:   Diabetic foot infection/osteomyelitis    Discharge Planning:   Estimated Length of IV antimicrobials: November 30, 2020  Patient will need PICC line Insertion: Yes  Patient will need: Home IV , SNF  Patient willneed outpatient wound care: Yes    Medical Decision making / Summary of Stay:   Simran Garcia is a 63y.o.-year-old male who was initially admitted on 10/19/2020. Seng Melton has a history of diabetes mellitus type 2 with associated diabetic neuropathy, morbid obesity, obstructive sleep apnea on CPAP, chronic kidney disease and he has had chronic issues with ulcerations on the plantar aspect of his left foot by the subfirst metatarsal.  The patient has been following with wound care and the ulceration has been present for at least a year.   The patient was hospitalized here in February 2019 with a left-sided diabetic foot infection secondary to Morganella morganii as Negative. Respiratory: Negative. Cardiovascular: Negative. Gastrointestinal: Negative. Genitourinary: Negative. Musculoskeletal: Negative. Skin: Positive for wound. Allergic/Immunologic: Negative. Neurological: Negative. Physical Examination :     Physical Exam  Constitutional:       Appearance: He is well-developed. HENT:      Head: Normocephalic and atraumatic. Neck:      Musculoskeletal: Normal range of motion and neck supple. Cardiovascular:      Rate and Rhythm: Normal rate. Heart sounds: Normal heart sounds. No friction rub. No gallop. Pulmonary:      Effort: Pulmonary effort is normal.      Breath sounds: Normal breath sounds. No wheezing. Abdominal:      General: Bowel sounds are normal.      Palpations: Abdomen is soft. There is no mass. Tenderness: There is no abdominal tenderness. Musculoskeletal: Normal range of motion. Lymphadenopathy:      Cervical: No cervical adenopathy. Skin:     General: Skin is warm and dry. Comments: Dressing in the foot not removed   Neurological:      Mental Status: He is alert and oriented to person, place, and time. Laboratory data:   I have independently reviewed the followinglabs:  CBC with Differential:   Recent Labs     10/27/20  0553   WBC 6.9   HGB 13.2   HCT 41.1      LYMPHOPCT 31   MONOPCT 9     BMP:   Recent Labs     10/27/20  0553      K 4.4      CO2 28   BUN 20   CREATININE 1.31*     Hepatic Function Panel: No results for input(s): PROT, LABALBU, BILIDIR, IBILI, BILITOT, ALKPHOS, ALT, AST in the last 72 hours.       No results found for: PROCAL  Lab Results   Component Value Date    CRP 94.2 10/23/2020    .9 10/21/2020    .1 10/19/2020     Lab Results   Component Value Date    SEDRATE 86 (H) 10/19/2020         No results found for: DDIMER  No results found for: FERRITIN  No results found for: LDH  No results found for: FIBRINOGEN    Results in Past 30 Days  Result Component Current Result Ref Range Previous Result Ref Range   SARS-CoV-2      (10/19/2020)  Not in Time Range          (10/19/2020)       Not Detected (10/19/2020) Not Detected       Lab Results   Component Value Date    COVID19 Not Detected 10/19/2020       No results for input(s): JEAN in the last 72 hours. Surgical Pathology Report  10/21/2020  2:11 PM  [facility]    -- Diagnosis --   1.  LEFT HALLUX, AMPUTATION:- ACUTE AND CHRONIC CELLULITIS IN   SUBCUTANEOUS SOFT TISSUE WITH ABSCESS,    EXTENDING TO THE SOFT TISSUE MARGIN.- ACUTE AND CHRONIC OSTEOMYELITIS   WITH FOCAL MEDULLARY FIBROSIS.- BONE AND CARTILAGE AT THE SURGICAL   MARGIN APPEAR VIABLE. 2.  LEFT FOOT, SOFT TISSUE, EXCISION:- ULCERATION, SEVERE ACUTE AND   CHRONIC CELLULITIS WITH CHRONIC ACTIVE    ABSCESS AND FIBROSIS. 3.  LEFT FOOT, FIRST BONE, BIOPSY:- FOCAL ACUTE OSTEOMYELITIS. 4.  LEFT FOOT, SESAMOID BONE, EXCISION:- ACUTE SUPPURATIVE AND CHRONIC   OSTEOMYELITIS WITH EROSION/ULCERATION    AND DESTRUCTIVE BONE CHANGES.        Raymond Soriano,   **Electronically Signed Out**         sls/10/22/2020            Imaging Studies:   No new imaging    Cultures:     Culture, Anaerobic and Aerobic [[id number]]  (Abnormal)   Collected: 10/19/20 2008    Order Status: Completed  Specimen: Foot  Updated: 10/22/20 2228     Specimen Description  . FOOT LEFT FOOT     Special Requests  NOT REPORTED     Direct Exam  FEW NEUTROPHILSAbnormal        FEW GRAM POSITIVE COCCI IN CLUSTERSAbnormal        RARE GRAM NEGATIVE RODSAbnormal       Culture  ENTEROCOCCUS FAECALIS LIGHT GROWTHAbnormal        NORMAL CHHAYA      FINEGOLDIA MAGNA LIGHT GROWTHAbnormal     Enterococcus  faecalis (1)     Antibiotic  Interpretation  RICHARD  Status     ampicillin  Sensitive   Preliminary      <=2   SUSCEPTIBLE    penicillin    Preliminary      NOT REPORTED    ciprofloxacin    Preliminary      NOT REPORTED    erythromycin    Preliminary      NOT REPORTED Gentamicin, High Level   NOT REPORTED  Preliminary     levofloxacin    Preliminary      NOT REPORTED    linezolid    Preliminary      NOT REPORTED    nitrofurantoin    Preliminary      NOT REPORTED    Synercid    Preliminary      NOT REPORTED    Streptomycin, Hi Level   NOT REPORTED  Preliminary     tetracycline    Preliminary      NOT REPORTED    tigecycline    Preliminary      NOT REPORTED    vancomycin  Sensitive   Preliminary      1   SUSCEPTIBLE      Culture, Anaerobic and Aerobic [[id number]]  (Abnormal)   Collected: 10/21/20 1016    Order Status: Completed  Specimen: Bone from Foot  Updated: 10/24/20 8168     Specimen Description  . FOOT LEFT SESMOID     Special Requests  NOT REPORTED     Direct Exam  FEW NEUTROPHILSAbnormal        FEW GRAM POSITIVE COCCI IN PAIRS AND CLUSTERSAbnormal       Culture  ENTEROCOCCUS FAECALIS LIGHT GROWTHAbnormal        NORMAL CHHAYA      FINEGOLDIA MAGNA MODERATE GROWTHAbnormal     Enterococcus  faecalis (1)     Antibiotic  Interpretation  RICHARD  Status     ampicillin  Sensitive   Preliminary      <=2   SUSCEPTIBLE    penicillin    Preliminary      NOT REPORTED    ciprofloxacin    Preliminary      NOT REPORTED    erythromycin    Preliminary      NOT REPORTED    Gentamicin, High Level   NOT REPORTED  Preliminary     levofloxacin    Preliminary      NOT REPORTED    linezolid    Preliminary      NOT REPORTED    nitrofurantoin    Preliminary      NOT REPORTED    Synercid    Preliminary      NOT REPORTED    Streptomycin, Hi Level   NOT REPORTED  Preliminary     tetracycline    Preliminary      NOT REPORTED    tigecycline    Preliminary      NOT REPORTED    vancomycin  Sensitive   Preliminary      1   SUSCEPTIBLE      Culture, Anaerobic and Aerobic [[id number]]  (Abnormal)  Collected: 10/21/20 1015    Order Status: Completed  Specimen: Bone from Foot  Updated: 10/25/20 1630     Specimen Description  . FOOT LEFT 1ST BONE     Special Requests  NOT REPORTED     Direct Exam  NO NEUTROPHILS SEEN      NO BACTERIA SEEN     Culture  STAPHYLOCOCCUS SPECIES, COAGULASE NEGATIVE 1 COLONY FORMING UNIT Susceptibilities have not been performed.  Notify the laboratory within 7 days for further workup if clinically indicated. Abnormal        NO ANAEROBIC ORGANISMS ISOLATED AT 4 DAYSAbnormal         Medications:      sodium chloride flush  10 mL Intravenous 2 times per day    ampicillin-sulbactam  3 g Intravenous Q6H    insulin glargine  40 Units Subcutaneous BID    insulin lispro  0-12 Units Subcutaneous TID WC    insulin lispro  0-6 Units Subcutaneous Nightly    allopurinol  300 mg Oral Daily    gabapentin  300 mg Oral BID    magnesium oxide  400 mg Oral Daily    therapeutic multivitamin-minerals  1 tablet Oral Daily    sodium chloride flush  10 mL Intravenous 2 times per day    heparin (porcine)  5,000 Units Subcutaneous 3 times per day           [facility]  [address]  [employer] messaging  OFFICE: [phone]      Electronically signed by [address] [place] [address] Street, MD on 10/27/2020 at 11:28 AM  Thank you for allowing us to participate in the care of this patient. Please call with questions. This note iscreated with the assistance of a speech recognition program.  While intending to generate a document that actually reflects the content of the visit, the document can still have some errors including those of syntax andsound a like substitutions which may escape proof reading. In such instances, actual meaning can be extrapolated by contextual diversion.

## 2020-10-27 NOTE — PLAN OF CARE
Problem: Falls - Risk of:  Goal: Will remain free from falls  Description: Will remain free from falls  10/27/2020 1002 by Gayatri Hurley RN  Outcome: Ongoing  10/27/2020 0134 by Bianca Márquez RN  Outcome: Ongoing  Note: Siderails up x 2  Hourly rounding  Call light in reach  Instructed to call for assist before attempting out of bed. Remains free from falls and accidental injury at this time   Floor free from obstacles  Bed is locked and in lowest position  Adequate lighting provided  Bed alarm on, Red Falling star and Stay with Me signs posted  Goal: Absence of physical injury  Description: Absence of physical injury  10/27/2020 1002 by Gayatri Hurley RN  Outcome: Ongoing  10/27/2020 0134 by Bianca Márquez RN  Outcome: Ongoing     Problem: Infection:  Goal: Will remain free from infection  Description: Will remain free from infection  10/27/2020 1002 by Gayatri Hurley RN  Outcome: Ongoing  10/27/2020 0134 by Bianca Márquez RN  Outcome: Ongoing     Problem: Safety:  Goal: Free from accidental physical injury  Description: Free from accidental physical injury  10/27/2020 1002 by Gayatri Hurley RN  Outcome: Ongoing  10/27/2020 0134 by Bianca Márquez RN  Outcome: Ongoing  Goal: Free from intentional harm  Description: Free from intentional harm  10/27/2020 1002 by Gayatri Hurley RN  Outcome: Ongoing  10/27/2020 0134 by Bianca Márquez RN  Outcome: Ongoing     Problem: Daily Care:  Goal: Daily care needs are met  Description: Daily care needs are met  10/27/2020 1002 by Gayatri Hurley RN  Outcome: Ongoing  10/27/2020 0134 by Bianca Márquez RN  Outcome: Ongoing     Problem: Pain:  Goal: Patient's pain/discomfort is manageable  Description: Patient's pain/discomfort is manageable  10/27/2020 1002 by Gayatri Hurley RN  Outcome: Ongoing  10/27/2020 0134 by Bianca Márquez RN  Outcome: Ongoing  Note: Pain level assessment complete.    Patient educated on pain scale and control interventions  PRN pain medication given per patient request-Oxcodone  Patient instructed to call out with new onset of pain or unrelieved pain    Goal: Pain level will decrease  Description: Pain level will decrease  10/27/2020 1002 by Fabby Easton RN  Outcome: Ongoing  10/27/2020 0134 by Royal William RN  Outcome: Ongoing  Goal: Control of acute pain  Description: Control of acute pain  10/27/2020 1002 by Fabby Easton RN  Outcome: Ongoing  10/27/2020 0134 by Royal William RN  Outcome: Ongoing  Goal: Control of chronic pain  Description: Control of chronic pain  10/27/2020 1002 by Fabby Easton RN  Outcome: Ongoing  10/27/2020 0134 by Royal William RN  Outcome: Ongoing     Problem: Skin Integrity:  Goal: Skin integrity will stabilize  Description: Skin integrity will stabilize  10/27/2020 1002 by Fabby Easton RN  Outcome: Ongoing  10/27/2020 0134 by Royal William RN  Outcome: Ongoing     Problem: Discharge Planning:  Goal: Patients continuum of care needs are met  Description: Patients continuum of care needs are met  10/27/2020 1002 by Fabby Easton RN  Outcome: Ongoing  10/27/2020 0134 by Royal William RN  Outcome: Ongoing     Problem: Nutrition  Goal: Optimal nutrition therapy  10/27/2020 1002 by aFbby Easton RN  Outcome: Ongoing  10/27/2020 0134 by Royal William RN  Outcome: Ongoing     Problem: Discharge Planning:  Goal: Discharged to appropriate level of care  Description: Discharged to appropriate level of care  10/27/2020 1002 by Fabby Easton RN  Outcome: Ongoing  10/27/2020 0134 by Royal William RN  Outcome: Ongoing     Problem: Serum Glucose Level - Abnormal:  Goal: Ability to maintain appropriate glucose levels will improve  Description: Ability to maintain appropriate glucose levels will improve  10/27/2020 1002 by Fabby Easton RN  Outcome: Ongoing  10/27/2020 0134 by Jyoti Dee RN  Outcome: Ongoing     Problem: Sensory Perception - Impaired:  Goal: Ability to maintain a stable neurologic state will improve  Description: Ability to maintain a stable neurologic state will improve  10/27/2020 1002 by Millicent Kearns RN  Outcome: Ongoing  10/27/2020 0134 by Jyoti Dee RN  Outcome: Ongoing

## 2020-10-27 NOTE — PLAN OF CARE
Problem: Falls - Risk of:  Goal: Will remain free from falls  Description: Will remain free from falls  10/27/2020 0134 by Saulo Valdovinos RN  Outcome: Ongoing  Note: Siderails up x 2  Hourly rounding  Call light in reach  Instructed to call for assist before attempting out of bed. Remains free from falls and accidental injury at this time   Floor free from obstacles  Bed is locked and in lowest position  Adequate lighting provided  Bed alarm on, Red F[place] star and Stay with Me signs posted  10/26/2020 1154 by Shaunna Baltazar RN  Outcome: Ongoing  Goal: Absence of physical injury  Description: Absence of physical injury  10/27/2020 0134 by Saulo Valdovinos RN  Outcome: Ongoing  10/26/2020 1154 by Shaunna Baltazar RN  Outcome: Ongoing     Problem: Infection:  Goal: Will remain free from infection  Description: Will remain free from infection  10/27/2020 0134 by Saulo Valdovinos RN  Outcome: Ongoing  10/26/2020 1154 by Shaunna Baltazar RN  Outcome: Ongoing     Problem: Safety:  Goal: Free from accidental physical injury  Description: Free from accidental physical injury  10/27/2020 0134 by Saulo Valdovinos RN  Outcome: Ongoing  10/26/2020 1154 by Shaunna Baltazar RN  Outcome: Ongoing  Goal: Free from intentional harm  Description: Free from intentional harm  10/27/2020 0134 by Saulo Valdovinos RN  Outcome: Ongoing  10/26/2020 1154 by Shaunna Baltazar RN  Outcome: Ongoing     Problem: Daily Care:  Goal: Daily care needs are met  Description: Daily care needs are met  10/27/2020 0134 by Saulo Valdovinos RN  Outcome: Ongoing  10/26/2020 1154 by Shaunna Baltazar RN  Outcome: Ongoing     Problem: Pain:  Goal: Patient's pain/discomfort is manageable  Description: Patient's pain/discomfort is manageable  10/27/2020 0134 by Saulo Valdovinos RN  Outcome: Ongoing  Note: Pain level assessment complete.    Patient educated on pain scale and control interventions  PRN pain by Tamiko Tian RN  Outcome: Ongoing     Problem: Sensory Perception - Impaired:  Goal: Ability to maintain a stable neurologic state will improve  Description: Ability to maintain a stable neurologic state will improve  10/27/2020 0134 by Blayne Palacios RN  Outcome: Ongoing  10/26/2020 1154 by Tamiko Tian RN  Outcome: Ongoing

## 2020-10-27 NOTE — PROGRESS NOTES
[facility]  Office: [address], DO, Dyan Vinnie, DO, Emilyenrique Sandoval, DO, Pedrito Calvillodinh Blood, DO, Dom Quintero MD, Jeison Ely MD, Sendy Ball MD, Miriam Newman MD, Thang Thompson MD, Walter Car MD, Alena Rodriguez MD, Janie Ruiz MD, Shania Davalso MD, Tom Boucher, DO, Merrick Gayle MD, Aram Haynes MD, Vishal Yoo DO, Richard Pittman MD,  Leland Norris DO, Sarah Romero MD, Baldemar Aleman MD, Angelina Fleming, [facility], [facility], CNP, Yusef Richardson, CNP, Edita Levine, CNS, Brian Malhotra, CNP, Samreen Marte, CNP, Yanely Canseco, CNP, Freida Wagoner, CNP, Dorie Trejo, CNP, Willy Dixon PA-C, Monster Krueger, [facility], Librado Enrique, CNP, Jenny Sim, CNP, Jacob Saul, CNP, Rosa Carrillo, CNP, Dave Hairston, CNP         [facility]   [address]    Progress Note    10/27/2020    2:57 PM    Name:   Karis Parker  MRN:     [id number]     [place]:      [de-identified]   Room:   2021/2021-01  IP Day:  8  Admit Date:  10/19/2020  5:42 PM    PCP:   Bear Johnson MD  Code Status:  Full Code    Subjective:     C/C:   Chief Complaint   Patient presents with    Cellulitis     left foot     Interval History Status: not changed. No acute events overnight. Patient was seen and examined at bedside. Reports that his pain in his left foot is well-controlled. Tolerating diet. Awaiting precertification for SNF placement. PICC line in right upper arm. Glucoses remain elevated. However, patient notes that his glucoses were running anywhere between 300-500 prior to hospitalization. Brief History:     Per my REGINA:  \"Shad Serrano is a 63 y.o. Non-/non  male who presents with Cellulitis (left foot)   and is admitted to the hospital for the management of Non-healing ulcer of foot, left, with unspecified severity ([facility] [address]      The patient reports to the hospital with complaint of worsening left foot wound.  Patient reports that he had a chronic left foot wound was under the care of Dr. Jadyn Steward with podiatry.  He also endorses a fracture to his left great toe that is not new. He states that his wound had previously closed and that approximately 7-10 days ago he noticed increased redness, swelling and an open area to his left foot.  He was prescribed ciprofloxacin 1 week ago and did not see any improvement.  He was seen by Dr. Eldon Vega with podiatry who encouraged him to come to the ER for further evaluation.  The patient endorses neuropathy to his bilateral feet, he does report some pain to his left foot that he describes as dull, intermittent and moderate in intensity.  It is aggravated with movement.  No definitive alleviating factors.  He denies fever, chills, nausea or vomiting.  No additional symptomology. [facility] has past medical history that includes type 2 diabetes, CKD and RAÚL with nightly BiPAP use.     Creatinine 1.51, WBC 11.1, glucose 280, .1, sed rate 86.\"    He is now status post great toe amputation and bone biopsy of the first metatarsal on the left foot on 10/21/2020. Glucoses out of control    On Unasyn, awaiting placement. Review of Systems:     Constitutional:  negative for chills, fevers, sweats  Respiratory:  negative for cough, dyspnea on exertion, shortness of breath, wheezing  Cardiovascular:  negative for chest pain, chest pressure/discomfort, lower extremity edema, palpitations  Gastrointestinal:  negative for abdominal pain, constipation, diarrhea, nausea, vomiting   Neurological:  negative for dizziness, headache    Medications:      Allergies:  No Known Allergies    Current Meds:   Scheduled Meds:    sodium chloride flush  10 mL Intravenous 2 times per day    ampicillin-sulbactam  3 g Intravenous Q6H    insulin glargine  40 Units Subcutaneous BID    insulin lispro  0-12 Units Subcutaneous TID WC    insulin lispro  0-6 Units Subcutaneous Nightly    allopurinol  300 mg Oral Daily    gabapentin  300 mg Oral BID    magnesium oxide  400 mg Oral Daily    therapeutic multivitamin-minerals  1 tablet Oral Daily    sodium chloride flush  10 mL Intravenous 2 times per day    heparin (porcine)  5,000 Units Subcutaneous 3 times per day     Continuous Infusions:    dextrose       PRN Meds: sodium chloride flush, oxyCODONE **OR** oxyCODONE, LORazepam, melatonin, sodium chloride flush, potassium chloride **OR** potassium alternative oral replacement **OR** potassium chloride, magnesium sulfate, acetaminophen **OR** acetaminophen, polyethylene glycol, promethazine **OR** ondansetron, nicotine, glucose, dextrose, glucagon (rDNA), dextrose, morphine    Data:     Past Medical History:   has a past medical history of Chronic kidney disease, Diabetes mellitus ([facility] [address] Diabetic neuropathy ([facility] [address] History of kidney stones, and Sleep apnea. Social History:   reports that he has never smoked. He has never used smokeless tobacco. He reports current alcohol use. He reports that he does not use drugs. Family History:   Family History   Problem Relation Age of Onset    Heart Disease Mother     Heart Disease Father        Vitals:  /70   Pulse 70   Temp 98.2 °F (36.8 °C) (Oral)   Resp 17   Ht 6' 4\" (1.93 m)   Wt (!) 363 lb 3.2 oz (164.7 kg)   SpO2 93%   BMI 44.21 kg/m²   Temp (24hrs), Av °F (36.7 °C), Min:97.2 °F (36.2 °C), Max:98.6 °F (37 °C)    Recent Labs     10/26/20  1539 10/26/20  2014 10/27/20  0617 10/27/20  1132   POCGLU 324* 225* 166* 192*       I/O (24Hr):     Intake/Output Summary (Last 24 hours) at 10/27/2020 1457  Last data filed at 10/27/2020 1205  Gross per 24 hour   Intake 600 ml   Output 1375 ml   Net -775 ml       Labs:  Hematology:  Recent Labs     10/27/20  0553   WBC 6.9   RBC 4.45   HGB 13.2   HCT 41.1   MCV 92.4   MCH 29.7   MCHC 32.1   RDW 12.1      MPV 8.6     Chemistry:  Recent Labs     10/27/20  0553      K 4.4      CO2 28   GLUCOSE 187*   BUN 20 CREATININE 1.31*   ANIONGAP 7*   LABGLOM 56*   GFRAA >60   CALCIUM 10.0     Recent Labs     10/26/20  0615 10/26/20  1136 10/26/20  1539 10/26/20  2014 10/27/20  0617 10/27/20  1132   POCGLU 196* 181* 324* 225* 166* 192*     ABG:No results found for: POCPH, PHART, PH, POCPCO2, GDX9UYO, PCO2, POCPO2, PO2ART, PO2, POCHCO3, HQO2QXW, HCO3, NBEA, PBEA, BEART, BE, THGBART, THB, EZW4NJH, CKGA5UMA, U0NOFHHP, O2SAT, FIO2  Lab Results   Component Value Date/Time    SPECIAL NOT REPORTED 10/21/2020 10:16 AM     Lab Results   Component Value Date/Time    CULTURE ENTEROCOCCUS FAECALIS LIGHT GROWTH (A) 10/21/2020 10:16 AM    CULTURE NORMAL CHHAYA 10/21/2020 10:16 AM    CULTURE FINEGOLDIA MAGNA MODERATE GROWTH (A) 10/21/2020 10:16 AM       Radiology:  Ir Insert Picc Vad W Sq Port >5 Years    Result Date: 10/26/2020  Successful ultrasound and fluoroscopy guided right basilic vein 5 Kinyarwanda power injectable dual-lumen PICC placement. Ready for use. Physical Examination:        General appearance:  alert, cooperative and no distress, morbidly obese  gentleman lying supine in bed  Mental Status:  oriented to person, place and time and normal affect  Lungs:  clear to auscultation bilaterally, normal effort  Heart:  regular rate and rhythm, no murmur  Abdomen:  soft, nontender, protuberant, nondistended, normal bowel sounds, no masses, hepatomegaly, splenomegaly  Extremities:  no edema, redness, tenderness in the calves.   Ace wrap covering patient's left distal portion of the foot to the ankle, no strikethrough present  Skin:  no gross lesions, rashes, induration    Assessment:        Hospital Problems           Last Modified POA    * (Principal) Non-healing ulcer of foot, left, with unspecified severity ([address] 10/19/2020 Yes    Type 2 diabetes mellitus with hyperglycemia, with long-term current use of insulin ([address] 10/19/2020 Yes    Diabetic autonomic neuropathy associated with type 2 diabetes mellitus ([address] 10/19/2020 Yes    Obstructive sleep apnea 10/19/2020 Yes    Class 3 severe obesity due to excess calories with serious comorbidity and body mass index (BMI) of 45.0 to 49.9 in adult [facility]) 10/19/2020 Yes    CKD (chronic kidney disease) stage 3, GFR 30-59 ml/min 10/20/2020 Yes    Sepsis ([address] 10/22/2020 Yes          Plan:        1. Appreciate infectious disease and podiatry input  2. Needs 6 weeks of Unasyn, PICC line present  3. Increased lantus to 45 units BID  4. Change heparin to lovenox  5. Continue CPAP therapy for RAÚL  6. Continue to work with PT/OT  7.  Awaiting precertification for SNF    Avtar Garay DO  10/27/2020  2:57 PM

## 2020-10-28 PROBLEM — A41.9 SEPSIS (HCC): Status: RESOLVED | Noted: 2020-10-22 | Resolved: 2020-10-28

## 2020-10-28 LAB
-: NORMAL
ABSOLUTE EOS #: 0.4 K/UL (ref 0–0.44)
ABSOLUTE IMMATURE GRANULOCYTE: 0.19 K/UL (ref 0–0.3)
ABSOLUTE LYMPH #: 2.59 K/UL (ref 1.1–3.7)
ABSOLUTE MONO #: 0.69 K/UL (ref 0.1–1.2)
ANION GAP SERPL CALCULATED.3IONS-SCNC: 8 MMOL/L (ref 9–17)
BASOPHILS # BLD: 1 % (ref 0–2)
BASOPHILS ABSOLUTE: 0.06 K/UL (ref 0–0.2)
BUN BLDV-MCNC: 27 MG/DL (ref 8–23)
BUN/CREAT BLD: 19 (ref 9–20)
CALCIUM SERPL-MCNC: 9.9 MG/DL (ref 8.6–10.4)
CHLORIDE BLD-SCNC: 101 MMOL/L (ref 98–107)
CO2: 28 MMOL/L (ref 20–31)
CREAT SERPL-MCNC: 1.4 MG/DL (ref 0.7–1.2)
DIFFERENTIAL TYPE: ABNORMAL
EOSINOPHILS RELATIVE PERCENT: 5 % (ref 1–4)
ESTIMATED AVERAGE GLUCOSE: 286 MG/DL
GFR AFRICAN AMERICAN: >60 ML/MIN
GFR NON-AFRICAN AMERICAN: 52 ML/MIN
GFR SERPL CREATININE-BSD FRML MDRD: ABNORMAL ML/MIN/{1.73_M2}
GFR SERPL CREATININE-BSD FRML MDRD: ABNORMAL ML/MIN/{1.73_M2}
GLUCOSE BLD-MCNC: 188 MG/DL (ref 75–110)
GLUCOSE BLD-MCNC: 203 MG/DL (ref 70–99)
GLUCOSE BLD-MCNC: 226 MG/DL (ref 75–110)
GLUCOSE BLD-MCNC: 274 MG/DL (ref 75–110)
GLUCOSE BLD-MCNC: 288 MG/DL (ref 75–110)
HBA1C MFR BLD: 11.6 % (ref 4–6)
HCT VFR BLD CALC: 39.7 % (ref 40.7–50.3)
HEMOGLOBIN: 12.9 G/DL (ref 13–17)
IMMATURE GRANULOCYTES: 2 %
LYMPHOCYTES # BLD: 32 % (ref 24–43)
MCH RBC QN AUTO: 30.2 PG (ref 25.2–33.5)
MCHC RBC AUTO-ENTMCNC: 32.5 G/DL (ref 28.4–34.8)
MCV RBC AUTO: 93 FL (ref 82.6–102.9)
MONOCYTES # BLD: 9 % (ref 3–12)
NRBC AUTOMATED: 0 PER 100 WBC
PDW BLD-RTO: 12.4 % (ref 11.8–14.4)
PLATELET # BLD: 290 K/UL (ref 138–453)
PLATELET ESTIMATE: ABNORMAL
PMV BLD AUTO: 8.5 FL (ref 8.1–13.5)
POTASSIUM SERPL-SCNC: 4.6 MMOL/L (ref 3.7–5.3)
RBC # BLD: 4.27 M/UL (ref 4.21–5.77)
RBC # BLD: ABNORMAL 10*6/UL
REASON FOR REJECTION: NORMAL
SEG NEUTROPHILS: 51 % (ref 36–65)
SEGMENTED NEUTROPHILS ABSOLUTE COUNT: 4.16 K/UL (ref 1.5–8.1)
SODIUM BLD-SCNC: 137 MMOL/L (ref 135–144)
WBC # BLD: 8.1 K/UL (ref 3.5–11.3)
WBC # BLD: ABNORMAL 10*3/UL
ZZ NTE CLEAN UP: ORDERED TEST: NORMAL
ZZ NTE WITH NAME CLEAN UP: SPECIMEN SOURCE: NORMAL

## 2020-10-28 PROCEDURE — 6370000000 HC RX 637 (ALT 250 FOR IP): Performed by: NURSE PRACTITIONER

## 2020-10-28 PROCEDURE — 2580000003 HC RX 258: Performed by: RADIOLOGY

## 2020-10-28 PROCEDURE — 82947 ASSAY GLUCOSE BLOOD QUANT: CPT

## 2020-10-28 PROCEDURE — 6370000000 HC RX 637 (ALT 250 FOR IP): Performed by: STUDENT IN AN ORGANIZED HEALTH CARE EDUCATION/TRAINING PROGRAM

## 2020-10-28 PROCEDURE — 99232 SBSQ HOSP IP/OBS MODERATE 35: CPT | Performed by: NURSE PRACTITIONER

## 2020-10-28 PROCEDURE — 6360000002 HC RX W HCPCS: Performed by: INTERNAL MEDICINE

## 2020-10-28 PROCEDURE — APPSS45 APP SPLIT SHARED TIME 31-45 MINUTES: Performed by: NURSE PRACTITIONER

## 2020-10-28 PROCEDURE — 2580000003 HC RX 258: Performed by: INTERNAL MEDICINE

## 2020-10-28 PROCEDURE — 97530 THERAPEUTIC ACTIVITIES: CPT

## 2020-10-28 PROCEDURE — 80048 BASIC METABOLIC PNL TOTAL CA: CPT

## 2020-10-28 PROCEDURE — 85025 COMPLETE CBC W/AUTO DIFF WBC: CPT

## 2020-10-28 PROCEDURE — 2580000003 HC RX 258: Performed by: STUDENT IN AN ORGANIZED HEALTH CARE EDUCATION/TRAINING PROGRAM

## 2020-10-28 PROCEDURE — 83036 HEMOGLOBIN GLYCOSYLATED A1C: CPT

## 2020-10-28 PROCEDURE — 1200000000 HC SEMI PRIVATE

## 2020-10-28 PROCEDURE — 6370000000 HC RX 637 (ALT 250 FOR IP): Performed by: INTERNAL MEDICINE

## 2020-10-28 PROCEDURE — 36415 COLL VENOUS BLD VENIPUNCTURE: CPT

## 2020-10-28 PROCEDURE — 97110 THERAPEUTIC EXERCISES: CPT

## 2020-10-28 RX ORDER — INSULIN GLARGINE 100 [IU]/ML
40 INJECTION, SOLUTION SUBCUTANEOUS 2 TIMES DAILY
Qty: 1 VIAL | Refills: 3 | Status: SHIPPED | OUTPATIENT
Start: 2020-10-28

## 2020-10-28 RX ADMIN — ENOXAPARIN SODIUM 40 MG: 40 INJECTION SUBCUTANEOUS at 21:01

## 2020-10-28 RX ADMIN — SODIUM CHLORIDE 3 G: 900 INJECTION INTRAVENOUS at 03:15

## 2020-10-28 RX ADMIN — SODIUM CHLORIDE, PRESERVATIVE FREE 10 ML: 5 INJECTION INTRAVENOUS at 21:02

## 2020-10-28 RX ADMIN — INSULIN LISPRO 5 UNITS: 100 INJECTION, SOLUTION INTRAVENOUS; SUBCUTANEOUS at 21:15

## 2020-10-28 RX ADMIN — ENOXAPARIN SODIUM 40 MG: 40 INJECTION SUBCUTANEOUS at 09:21

## 2020-10-28 RX ADMIN — Medication 10 ML: at 09:21

## 2020-10-28 RX ADMIN — ALLOPURINOL 300 MG: 300 TABLET ORAL at 09:14

## 2020-10-28 RX ADMIN — Medication 10 ML: at 09:22

## 2020-10-28 RX ADMIN — GABAPENTIN 300 MG: 300 CAPSULE ORAL at 09:14

## 2020-10-28 RX ADMIN — INSULIN LISPRO 9 UNITS: 100 INJECTION, SOLUTION INTRAVENOUS; SUBCUTANEOUS at 16:50

## 2020-10-28 RX ADMIN — MULTIPLE VITAMINS W/ MINERALS TAB 1 TABLET: TAB at 09:15

## 2020-10-28 RX ADMIN — INSULIN LISPRO 2 UNITS: 100 INJECTION, SOLUTION INTRAVENOUS; SUBCUTANEOUS at 09:16

## 2020-10-28 RX ADMIN — INSULIN LISPRO 4 UNITS: 100 INJECTION, SOLUTION INTRAVENOUS; SUBCUTANEOUS at 12:10

## 2020-10-28 RX ADMIN — INSULIN GLARGINE 45 UNITS: 100 INJECTION, SOLUTION SUBCUTANEOUS at 21:15

## 2020-10-28 RX ADMIN — INSULIN GLARGINE 45 UNITS: 100 INJECTION, SOLUTION SUBCUTANEOUS at 09:15

## 2020-10-28 RX ADMIN — Medication 10 ML: at 21:01

## 2020-10-28 RX ADMIN — SODIUM CHLORIDE 3 G: 900 INJECTION INTRAVENOUS at 09:15

## 2020-10-28 RX ADMIN — SODIUM CHLORIDE 3 G: 900 INJECTION INTRAVENOUS at 21:01

## 2020-10-28 RX ADMIN — OXYCODONE HYDROCHLORIDE 10 MG: 5 TABLET ORAL at 21:01

## 2020-10-28 RX ADMIN — ACETAMINOPHEN 650 MG: 325 TABLET ORAL at 19:41

## 2020-10-28 RX ADMIN — OXYCODONE HYDROCHLORIDE 10 MG: 5 TABLET ORAL at 12:13

## 2020-10-28 RX ADMIN — ACETAMINOPHEN 650 MG: 325 TABLET ORAL at 12:10

## 2020-10-28 RX ADMIN — MAGNESIUM GLUCONATE 500 MG ORAL TABLET 400 MG: 500 TABLET ORAL at 09:14

## 2020-10-28 RX ADMIN — GABAPENTIN 300 MG: 300 CAPSULE ORAL at 21:01

## 2020-10-28 RX ADMIN — OXYCODONE HYDROCHLORIDE 10 MG: 5 TABLET ORAL at 16:50

## 2020-10-28 RX ADMIN — SODIUM CHLORIDE 3 G: 900 INJECTION INTRAVENOUS at 15:49

## 2020-10-28 RX ADMIN — OXYCODONE HYDROCHLORIDE 10 MG: 5 TABLET ORAL at 05:46

## 2020-10-28 ASSESSMENT — PAIN DESCRIPTION - LOCATION
LOCATION: FOOT
LOCATION: FOOT

## 2020-10-28 ASSESSMENT — PAIN DESCRIPTION - PROGRESSION

## 2020-10-28 ASSESSMENT — PAIN SCALES - GENERAL
PAINLEVEL_OUTOF10: 7
PAINLEVEL_OUTOF10: 7
PAINLEVEL_OUTOF10: 8
PAINLEVEL_OUTOF10: 5
PAINLEVEL_OUTOF10: 7
PAINLEVEL_OUTOF10: 7

## 2020-10-28 ASSESSMENT — PAIN DESCRIPTION - PAIN TYPE
TYPE: SURGICAL PAIN
TYPE: CHRONIC PAIN

## 2020-10-28 ASSESSMENT — ENCOUNTER SYMPTOMS
GASTROINTESTINAL NEGATIVE: 1
RESPIRATORY NEGATIVE: 1

## 2020-10-28 ASSESSMENT — PAIN DESCRIPTION - ORIENTATION: ORIENTATION: LEFT

## 2020-10-28 NOTE — PLAN OF CARE
Noah Contreras was evaluated today and a DME order was entered for a standard wheelchair because he requires this to successfully complete daily living tasks of eating, bathing, toileting, personal cares, grooming, hygiene, dressing upper body and dressing lower body. A standard manual wheelchair is necessary due to patient's impaired ambulation and mobility restrictions and would be unable to resolve these daily living tasks using a cane or walker. The patient is capable of using a standard wheelchair safely in their home and can maneuver within their home with adequate access. There is a caregiver available to provide necessary assistance. The need for this equipment was discussed with the patient and he understands, is in agreement, and has not expressed an unwillingness to use the wheelchair.     Seamus Serrano, [address]gregor

## 2020-10-28 NOTE — PLAN OF CARE
Impaired:  Goal: Ability to maintain a stable neurologic state will improve  Description: Ability to maintain a stable neurologic state will improve  Outcome: Ongoing

## 2020-10-28 NOTE — PROGRESS NOTES
Foot Debridement (Left, 04/18/2019); Skin graft (Left, 4/18/2019); Skin graft (Left, 5/7/2019); Toe amputation (Left, 10/21/2020); and IR INSERT PICC VAD W SQ PORT >5 YEARS (10/26/2020). Restrictions  Restrictions/Precautions  Restrictions/Precautions: Weight Bearing, Fall Risk, General Precautions, Up as Tolerated  Required Braces or Orthoses?: No  Lower Extremity Weight Bearing Restrictions  Left Lower Extremity Weight Bearing: Non Weight Bearing  Required Braces or Orthoses  Right Lower Extremity Brace: (L surgical shoe)  Position Activity Restriction  Other position/activity restrictions: Strict NWB following sx: amputation L hallux  Subjective   General  Chart Reviewed: Yes  Family / Caregiver Present: No  Subjective  Subjective: Patient agreeable for PT treatment. Orientation  Orientation  Overall Orientation Status: Within Normal Limits  Cognition   Cognition  Overall Cognitive Status: Exceptions  Following Commands:  Follows multistep commands with repitition  Attention Span: Appears intact  Memory: Appears intact  Safety Judgement: Decreased awareness of need for safety;Decreased awareness of need for assistance  Problem Solving: Assistance required to generate solutions;Assistance required to implement solutions;Decreased awareness of errors;Assistance required to correct errors made;Assistance required to identify errors made  Insights: Decreased awareness of deficits  Initiation: Requires cues for some  Sequencing: Requires cues for some  Cognition Comment: Patient requires motivation cues  Objective   Bed mobility  Rolling to Left: Independent  Rolling to Right: Independent  Supine to Sit: Independent  Sit to Supine: Independent  Scooting: Independent  Transfers  Sit to Stand: Stand by assistance  Stand to sit: Stand by assistance  Stand Pivot Transfers: Stand by assistance  Lateral Transfers: Stand by assistance  Comment: Education on propre technique and hand placement with sit to stand transfer. Patient continue to put weight on Lt heel during sit<>stand with constant VCs of strict NWBing restirictions. Patient not compliant. Ambulation  WB Status: L LE NWB  Ambulation 1  Surface: level tile  Device: (knee scooter)  Assistance: Supervision  Quality of Gait: VCs to NWB on LLE with sit<>stand to transfer to knee scooter, non compliant. VCs to slow down and increased safety with turns. Distance: 45' x 1     Balance  Posture: Good  Sitting - Static: Good  Standing - Static: Fair;+  Standing - Dynamic: Fair  Comments: Fair + with knee scooter. Exercises  Comments: Created and issues written seated and supine HEP. Patient performed seated bandar LE AROM x 10 reps with no rest breaks and supine bandar LE AROM x 10 reps.      Goals  Short term goals  Time Frame for Short term goals: 12 visits  Short term goal 1: Independent sit<>stand while maintaining L NWB  Short term goal 2: Ambulate 40 feet with roll walker L LE NWB    Plan    Plan  Times per week: 1-2x/day for 5-6 days/week  Current Treatment Recommendations: Gait Training, Transfer Training, Endurance Training, Safety Education & Training, Equipment Evaluation, Education, & procurement  Safety Devices  Type of devices: Nurse notified, Gait belt, Call light within reach, All fall risk precautions in place, Left in bed  Restraints  Initially in place: No     Therapy Time   Individual Concurrent Group Co-treatment   Time In 1430         Time Out 1510         Minutes [address]

## 2020-10-28 NOTE — PLAN OF CARE
Problem: Falls - Risk of:  Goal: Will remain free from falls  Description: Will remain free from falls  10/28/2020 1448 by Nisha Fung RN  Outcome: Ongoing  10/28/2020 0429 by Shaq Joseph RN  Outcome: Ongoing  Goal: Absence of physical injury  Description: Absence of physical injury  10/28/2020 1448 by Nisha Fung RN  Outcome: Ongoing  10/28/2020 0429 by Shaq Joseph RN  Outcome: Ongoing     Problem: Infection:  Goal: Will remain free from infection  Description: Will remain free from infection  10/28/2020 1448 by Nisha Fung RN  Outcome: Ongoing  10/28/2020 0429 by Shaq Joseph RN  Outcome: Ongoing     Problem: Safety:  Goal: Free from accidental physical injury  Description: Free from accidental physical injury  10/28/2020 1448 by Nisha Fung RN  Outcome: Ongoing  10/28/2020 0429 by Shaq Joseph RN  Outcome: Ongoing  Goal: Free from intentional harm  Description: Free from intentional harm  10/28/2020 1448 by Nisha Fung RN  Outcome: Ongoing  10/28/2020 0429 by Shaq Joseph RN  Outcome: Ongoing     Problem: Daily Care:  Goal: Daily care needs are met  Description: Daily care needs are met  10/28/2020 1448 by Nisha Fung RN  Outcome: Ongoing  10/28/2020 0429 by Shaq Joseph RN  Outcome: Ongoing     Problem: Pain:  Goal: Patient's pain/discomfort is manageable  Description: Patient's pain/discomfort is manageable  10/28/2020 1448 by Nisha Fung RN  Outcome: Ongoing  10/28/2020 0429 by Shaq Joseph RN  Outcome: Ongoing  Goal: Pain level will decrease  Description: Pain level will decrease  10/28/2020 1448 by Nisha Fung RN  Outcome: Ongoing  10/28/2020 0429 by Shaq Joseph RN  Outcome: Ongoing  Goal: Control of acute pain  Description: Control of acute pain  10/28/2020 1448 by Nisha Fung RN  Outcome: Ongoing  10/28/2020 0429 by Shaq Joseph RN  Outcome: Ongoing  Goal: Control of chronic pain  Description: Control of chronic pain  10/28/2020 1448 by Nisha Fung RN  Outcome: Ongoing  10/28/2020 0429 by Sylwia Neville RN  Outcome: Ongoing     Problem: Skin Integrity:  Goal: Skin integrity will stabilize  Description: Skin integrity will stabilize  10/28/2020 1448 by Florentin Mcdonnell RN  Outcome: Ongoing  10/28/2020 0429 by Sylwia Neville RN  Outcome: Ongoing     Problem: Discharge Planning:  Goal: Patients continuum of care needs are met  Description: Patients continuum of care needs are met  10/28/2020 1448 by Florentin Mcdonnell RN  Outcome: Ongoing  10/28/2020 0429 by Sylwia Neville RN  Outcome: Ongoing     Problem: Nutrition  Goal: Optimal nutrition therapy  10/28/2020 1448 by Florentin Mcdonnell RN  Outcome: Ongoing  10/28/2020 0429 by Sylwia Neville RN  Outcome: Ongoing     Problem: Discharge Planning:  Goal: Discharged to appropriate level of care  Description: Discharged to appropriate level of care  10/28/2020 1448 by Florentin Mcdonnell RN  Outcome: Ongoing  10/28/2020 0429 by Sylwia Neville RN  Outcome: Ongoing     Problem: Serum Glucose Level - Abnormal:  Goal: Ability to maintain appropriate glucose levels will improve  Description: Ability to maintain appropriate glucose levels will improve  10/28/2020 1448 by Florentin Mcdonnell RN  Outcome: Ongoing  10/28/2020 0429 by Sylwia Neville RN  Outcome: Ongoing     Problem: Sensory Perception - Impaired:  Goal: Ability to maintain a stable neurologic state will improve  Description: Ability to maintain a stable neurologic state will improve  10/28/2020 1448 by Florentin Mcdonnell RN  Outcome: Ongoing  10/28/2020 0429 by Sylwia Neville RN  Outcome: Ongoing  Patient is a fall risk during this admission. Fall risk assessment was performed. Patient is absent of falls. Bed is in the lowest position. Wheels on the bed are locked. Call light and bed side table are within reach. Clutter is removed. Patient was educated to call out when needing assistance or wanting to get out of bed. Patient offered toileting assistance during rounding.  Hourly rounds have been performed.

## 2020-10-28 NOTE — PROGRESS NOTES
reports that he had a chronic left foot wound was under the care of Dr. Dominique Wilson with podiatry.  He also endorses a fracture to his left great toe that is not new. He states that his wound had previously closed and that approximately 7-10 days ago he noticed increased redness, swelling and an open area to his left foot.  He was prescribed ciprofloxacin 1 week ago and did not see any improvement.  He was seen by Dr. Neris Hines with podiatry who encouraged him to come to the ER for further evaluation.  The patient endorses neuropathy to his bilateral feet, he does report some pain to his left foot that he describes as dull, intermittent and moderate in intensity.  It is aggravated with movement.  No definitive alleviating factors.  He denies fever, chills, nausea or vomiting.  No additional symptomology. Narinder Singh has past medical history that includes type 2 diabetes, CKD and RAÚL with nightly BiPAP use.     Creatinine 1.51, WBC 11.1, glucose 280, .1, sed rate 86.\"    He is now status post great toe amputation and bone biopsy of the first metatarsal on the left foot on 10/21/2020. Glucoses out of control, Lantus increased, sliding scale increased. Add A1C. On Unasyn, awaiting placement. PT OT to reevaluate patient. Patient may be discharged home. This will determine later today. Review of Systems:     Constitutional:  negative for chills, fevers, sweats  Respiratory:  negative for cough, dyspnea on exertion, shortness of breath, wheezing  Cardiovascular:  negative for chest pain, chest pressure/discomfort, lower extremity edema, palpitations  Gastrointestinal:  negative for abdominal pain, constipation, diarrhea, nausea, vomiting   Neurological:  negative for dizziness, headache    Medications:      Allergies:  No Known Allergies    Current Meds:   Scheduled Meds:    insulin glargine  45 Units Subcutaneous BID    enoxaparin  40 mg Subcutaneous BID    sodium chloride flush  10 mL Intravenous 2 times per day with unspecified severity ([facility] [address] 10/19/2020 Yes    Type 2 diabetes mellitus with hyperglycemia, with long-term current use of insulin ([address] 10/19/2020 Yes    Diabetic autonomic neuropathy associated with type 2 diabetes mellitus ([address] 10/19/2020 Yes    Obstructive sleep apnea 10/19/2020 Yes    Class 3 severe obesity due to excess calories with serious comorbidity and body mass index (BMI) of 45.0 to 49.9 in adult [facility]) 10/19/2020 Yes    CKD (chronic kidney disease) stage 3, GFR 30-59 ml/min 10/20/2020 Yes    Sepsis ([address] 10/22/2020 Yes          Plan:        1. Recommendations from podiatry and infectious disease are reviewed  2. Needs 6 weeks of Unasyn, PICC line present  3. Continue Lantus at current rate and increase sliding scale  4. Continue CPAP therapy for RAÚL  5. Continue to work with PT/OT  6.  Awaiting precertification for SNF    LIBBY Hansen NP  10/28/2020  1:05 PM

## 2020-10-28 NOTE — CARE COORDINATION
Social work: Phone call from [employer], authorization rec'd for admission to snf. However, patient has now opted to go home with HHC/IV antibiotics.

## 2020-10-28 NOTE — CARE COORDINATION
Discharge Planning    Pt wants to go home now instead of to a SNF. He will be going to his friends house at       [address] [place] [address], [address] [place] [address]. Pt is willing to learn to administer his own IV antibiotics. PICC line is in place. Script for Unasyn IV every 6 hrs faxed to [facility] checking on benefits. They will not be able to verify insurance and get medication ready for a discharge today. Nurse, pt and ID updated. Referral to Melinda Mcclellan at [facility] and he has to verify with his supervisor that they can handle the case. Explained to him that pt has done  dressing changes to his foot before and is willing to self administer IV ATB with instructions. Pt thinks a wheelchair would be helpful due to non weight bear status. He can not use a walker and has his own knee scooter. He also has crutches if needed. Message to Dr Aydee Yates or Aj Choi for script for walker. The Plan for Transition of Care is related to the following treatment goals: teach administer IV antibiotic, change PICC dressing weekly. PT/OT for strengthening    The Patient  was provided with a choice of provider and agrees   with the discharge plan. [x] Yes [] No    Freedom of choice list was provided with basic dialogue that supports the patient's individualized plan of care/goals, treatment preferences and shares the quality data associated with the providers. [x] Yes [] No    PLAN  Home with home care; waiting to verify that [facility] can accept the referral.    PICC line in place for Unasyn IV every 6 hrs thru Nov 30. Requested script for a wheelchair.     Going to friends home at [address] [place] [address], [address] [place] [address]

## 2020-10-28 NOTE — CARE COORDINATION
DC PLANNING:    Phong Brennan called from [address] and cannot accept dt out of state insurance is not in network plan-need a new choice. Called Anna from  [facility] she will run benefits.

## 2020-10-28 NOTE — PROGRESS NOTES
Occupational Therapy  Facility/Department: [facility]  Daily Treatment Note  NAME: Roger Estrada  : 1960  MRN: [id number]    Date of Service: 10/28/2020    Discharge Recommendations:   Home with assist PRN    Assessment   Performance deficits / Impairments: Decreased functional mobility ; Decreased ADL status; Decreased strength;Decreased safe awareness;Decreased balance  Prognosis: Good  OT Education: OT Role;Plan of Care;Precautions; Equipment;Transfer Training;Energy Conservation  Patient Education: knee scooter, wheelchair, elevating effected LE, mobility as often as tolerated  REQUIRES OT FOLLOW UP: Yes  Activity Tolerance  Activity Tolerance: Patient Tolerated treatment well  Activity Tolerance: limited by inability to maintain NWB  Safety Devices  Safety Devices in place: Yes  Type of devices: Call light within reach; Patient at risk for falls; Left in bed;Nurse notified         Patient Diagnosis(es): The primary encounter diagnosis was Diabetic ulcer of left foot associated with type 2 diabetes mellitus, with other ulcer severity, unspecified part of foot ([address] Diagnoses of Cellulitis of left lower extremity and Acute post-operative pain were also pertinent to this visit. has a past medical history of Chronic kidney disease, Diabetes mellitus ([address] Diabetic neuropathy ([address] History of kidney stones, and Sleep apnea. has a past surgical history that includes Lithotripsy (); Appendectomy; Nasal septum surgery; Foot Debridement (Left, 2016); skin split graft (Left, 2016); Foot surgery (Left, 2019); Foot Debridement (Left, 2019); Foot surgery (Left, 2019); Foot surgery (Left, 2019); Foot Debridement (Left, 3/12/2019); other surgical history (Left, 2019); Skin graft (Left, 4/3/2019); Foot Debridement (Left, 2019); Skin graft (Left, 2019); Skin graft (Left, 2019);  Toe amputation (Left, 10/21/2020); and IR INSERT PICC VAD W SQ PORT >5 YEARS (10/26/2020). Restrictions  Restrictions/Precautions  Restrictions/Precautions: Weight Bearing, Fall Risk, General Precautions, Up as Tolerated  Required Braces or Orthoses?: No  Lower Extremity Weight Bearing Restrictions  Left Lower Extremity Weight Bearing: Non Weight Bearing  Required Braces or Orthoses  Right Lower Extremity Brace: (L surgical shoe)  Position Activity Restriction  Other position/activity restrictions: Strict NWB following sx: amputation L hallux  Subjective   General  Chart Reviewed: Yes  Patient assessed for rehabilitation services?: Yes  Response to previous treatment: Patient with no complaints from previous session  Family / Caregiver Present: No      Orientation  Orientation  Overall Orientation Status: Within Normal Limits  Objective    ADL  Additional Comments: Patient just finishing bed side wash up upon arrival        Balance  Sitting Balance: Independent  Standing Balance: Stand by assistance  Functional Mobility  Functional - Mobility Device: Other(knee scooter)  Activity: Other  Assist Level: Stand by assistance  Functional Mobility Comments: Patient stated he will be using a knee scooter at home for mobility. Patient completed functional mobility from room<>RN station with use of knee scooter and SBA for safety. Patient required verbal cues for pacing self and slowing down with turns to increase overall safety  Bed mobility  Comment: Patient sitting on EOB  Transfers  Sit to stand: Supervision  Stand to sit: Supervision  Transfer Comments: max cues for safety and to follow WB precautions with poor follow thru. Cognition  Overall Cognitive Status: Exceptions  Arousal/Alertness: Appropriate responses to stimuli  Following Commands:  Follows multistep commands with repitition  Attention Span: Appears intact  Memory: Appears intact  Safety Judgement: Decreased awareness of need for safety;Decreased awareness of need for assistance  Problem Solving: Assistance required to generate solutions;Assistance required to implement solutions;Decreased awareness of errors;Assistance required to correct errors made;Assistance required to identify errors made  Insights: Decreased awareness of deficits  Initiation: Requires cues for some  Sequencing: Requires cues for some      Plan   Plan  Times per week: 4-5x/week  Current Treatment Recommendations: Safety Education & Training, Self-Care / ADL, Patient/Caregiver Education & Training, Strengthening, Balance Training, Functional Mobility Training, Endurance Training, Positioning, Equipment Evaluation, Education, & procurement  AM-PAC Score        AM-PAC Inpatient Daily Activity Raw Score: 21 (10/28/20 1453)  AM-PAC Inpatient ADL T-Scale Score : 44.27 (10/28/20 1453)  ADL Inpatient CMS 0-100% Score: 32.79 (10/28/20 1453)  ADL Inpatient CMS G-Code Modifier : Nino Rico (10/28/20 1453)    Goals  Short term goals  Time Frame for Short term goals: 4-5x/week, 1-2x/day  Short term goal 1: demo CGA with functional mob short distance for ADL completion with good adherance to NWB  Short term goal 2: demo CGA with toileting routine with approp DME and good safety  Short term goal 3: demo and verb good understanding of fall prevention techs, NWB techs, and possible equip needs  Patient Goals   Patient goals : to go home       Therapy Time   Individual Concurrent Group Co-treatment   Time In 1425         Time Out 1433         Minutes 8           Upon writer exit, call light within reach, pt retired to bed. All lines intact and patient positioned comfortably. All patient needs addressed prior to ending therapy session. Chart reviewed prior to treatment and patient is agreeable for therapy. RN reports patient is medically stable for therapy treatment this date.       CITLALI Longoria

## 2020-10-28 NOTE — DISCHARGE INSTR - DIET

## 2020-10-28 NOTE — DISCHARGE SUMMARY
poor     Discharged Condition: fair    Hospital Stay:     Hospital Course:  Stephanie Cox is a 63 y.o. male who was admitted for the management of  Non-healing ulcer of foot, left, with unspecified severity ([address] , presented to ER with Cellulitis (left foot)       The patient reports to the hospital with complaint of worsening left foot wound.  Patient reports that he had a chronic left foot wound was under the care of Dr. Nadeem Pereira with podiatry.  He also endorses a fracture to his left great toe that is not new. He states that his wound had previously closed and that approximately 7-10 days ago he noticed increased redness, swelling and an open area to his left foot.  He was prescribed ciprofloxacin 1 week ago and did not see any improvement.  He was seen by Dr. Giulia Galvan with podiatry who encouraged him to come to the ER for further evaluation.  The patient endorses neuropathy to his bilateral feet, he does report some pain to his left foot that he describes as dull, intermittent and moderate in intensity.  It is aggravated with movement.  No definitive alleviating factors.  He denies fever, chills, nausea or vomiting.  No additional symptomology. Catracho Kahn has past medical history that includes type 2 diabetes, CKD and RAÚL with nightly BiPAP use.     Creatinine 1.51, WBC 11.1, glucose 280, .1, sed rate 86. \"     He is now status post great toe amputation and bone biopsy of the first metatarsal on the left foot on 10/21/2020.     Glucoses out of control, Lantus increased, sliding scale increased. Add A1C.     On Unasyn, awaiting placement.       10/28 - PT OT to reevaluate patient. Patient may be discharged home. Patient was able to work with therapy and they recommended he can be safely discharged home. Patient's blood sugars have still been elevated. Patient has had his Lantus increased. We will restart Metformin at the renally adjusted dose. We will discontinue glipizide as we are increasing the Lantus. Patient will also be given a referral for outpatient med management with pharmacy team will work collaboratively with his PCP to address his poorly controlled sugar diabetes. Arrangements are made for the patient to receive a walker/wheelchair in the home. And patient will also receive IV antibiotics in the home as well. Outpatient follow-up with podiatry and infectious disease are recommended and contacts are given to the patient. Significant therapeutic interventions: Podiatry consultation and surgical intervention. Infection control intervention assessment and IV antibiotics. Glycemic control and alteration home medication of diabetes medications. Significant Diagnostic Studies:   Labs / Micro:  CBC:   Lab Results   Component Value Date    WBC 8.1 10/28/2020    RBC 4.27 10/28/2020    RBC 4.85 09/23/2011    HGB 12.9 10/28/2020    HCT 39.7 10/28/2020    MCV 93.0 10/28/2020    MCH 30.2 10/28/2020    MCHC 32.5 10/28/2020    RDW 12.4 10/28/2020     10/28/2020     09/23/2011     BMP:    Lab Results   Component Value Date    GLUCOSE 203 10/28/2020    GLUCOSE 198 09/23/2011     10/28/2020    K 4.6 10/28/2020     10/28/2020    CO2 28 10/28/2020    ANIONGAP 8 10/28/2020    BUN 27 10/28/2020    CREATININE 1.40 10/28/2020    BUNCRER 19 10/28/2020    CALCIUM 9.9 10/28/2020    LABGLOM 52 10/28/2020    GFRAA >60 10/28/2020    GFR      10/28/2020    GFR NOT REPORTED 10/28/2020     PT/INR:    Lab Results   Component Value Date    PROTIME 13.9 10/20/2020    INR 1.1 10/20/2020     U/A:  No results found for: [address], [facility], [address] [facility], Cindi Merritt, [address], [address] [facility]     Radiology:  Ir Insert Picc Vad W Sq Port >5 Years    Result Date: 10/26/2020  Successful ultrasound and fluoroscopy guided right basilic vein 5 Haitian power injectable dual-lumen PICC placement. Ready for use.        Consultations:    Consults:     Final Specialist Recommendations/Findings:   IP CONSULT TO INTERNAL MEDICINE  IP CONSULT TO PODIATRY  IP CONSULT TO INFECTIOUS DISEASES  IP CONSULT TO VASCULAR SURGERY      The patient was seen and examined on day of discharge and this discharge summary is in conjunction with any daily progress note from day of discharge. Discharge plan:     Disposition: Home    Physician Follow Up: Swapnamacho Ham, [address] [facility] [address]  [address] [place] [address] [address]  [address]  [phone]    In 1 week  For wound re-check    [facility] [facility] [address]  [phone]      for wheelchair. [place]  [phone]      IV medication       Requiring Further Evaluation/Follow Up POST HOSPITALIZATION/Incidental Findings: Poorly controlled diabetes. A1c results pending. Diet: diabetic diet    Activity: As tolerated    Instructions to Patient: Take the medications exactly as they prescribed. Take insulin twice a day and check your blood sugars 4 times a day. Adhere to your carb controlled diabetic diet. Restart the Metformin but discontinue the glipizide. Discharge Medications:      Medication List      START taking these medications    ampicillin-sulbactam  infusion  Commonly known as:  UNASYN  Infuse 3,000 mg intravenously every 6 hours Compound per protocol     metFORMIN 500 MG tablet  Commonly known as:  GLUCOPHAGE  Take 1 tablet by mouth 2 times daily (with meals)     oxyCODONE-acetaminophen 5-325 MG per tablet  Commonly known as:  PERCOCET  Take 1 tablet by mouth every 6 hours as needed for Pain for up to 7 days.   Replaces:  Percocet  MG per tablet        CHANGE how you take these medications    insulin glargine 100 UNIT/ML injection vial  Commonly known as:  Lantus  Inject 40 Units into the skin 2 times daily  What changed:  when to take this        CONTINUE taking these medications    allopurinol 300 MG tablet  Commonly known as:  ZYLOPRIM     [employer] documentation for this DME in a separate note. Extra heavy duty wheelchair patient weight >300 lbs    Elevating Leg Rest Pair and Articulating legrest right and left      Current body weight: Weight: (!) 375 lb (170.1 kg)  Current patient height: Height: 6' 4\" (193 cm)  Diagnosis: osteomyelitis  Length of need: 3 Months       Time Spent on discharge is  20 mins in patient examination, evaluation, counseling as well as medication reconciliation, prescriptions for required medications, discharge plan and follow up. Electronically signed by   LIBBY Michaud NP  10/28/2020  5:04 PM      Thank you Dr. Romero Ellis MD for the opportunity to be involved in this patient's care.

## 2020-10-28 NOTE — CARE COORDINATION
Discharge Planning    Order for standard wheelchair obtained and faxed to [id number] and spoke with Lalo Ames at the office  and they have a wheelchair in [address] and will deliver to pt's friends house at [address] [facility]  Pt informed.

## 2020-10-28 NOTE — PROGRESS NOTES
[facility]  Progress Note    Kingston Ventura  MRN: [id number]  Date: 10/28/2020  LOS: 9     Reason for F/U :   Infected diabetic foot ulcer/osteomyelitis    Impression :   1. Left-sided diabetic foot ulcer with associated infection/osteomyelitis  · Status post great toe amputation and bone biopsy of the first metatarsal 10/21/2020  · Surgical pathology consistent with osteomyelitis  · Culture with Enterococcus faecalis and Finegoldia  2. Diabetes mellitus type 2 with associated neuropathy  3. Diabetic nephropathy with chronic kidney disease, stage III  4. Obstructive sleep apnea with nightly BiPAP use  5. Morbid obesity    Recommendations:   · Plan is to continue patient on IV antimicrobial therapy with Unasyn through 11/30/2020 to complete a 6-week course  · I did place a prescription in the chart  · PICC line is in place in the right upper extremity  · Okay for discharge from an infectious disease standpoint    Infection Control Recommendations:   Diabetic foot infection/osteomyelitis    Discharge Planning:   Estimated Length of IV antimicrobials: 11/30/2020  Patient will need Midline Catheter Insertion/ PICC line Insertion: No  Patient will need: Home IV , [place],  SNF,  LTAC: Undetermined  Patient willneed outpatient wound care: No    Medical Decision making / Summary of Stay:   Jan Whiting a 63y.o.-year-old male who was initially admitted on 10/19/2020.   David Ontiveros has a history of diabetes mellitus type 2 with associated diabetic neuropathy, morbid obesity, obstructive sleep apnea on CPAP, chronic kidney disease and he has had chronic issues with ulcerations on the plantar aspect of his left foot by the subfirst metatarsal.  The patient has been following with wound care and the ulceration has been present for at least a year.   The patient was hospitalized here in February 2019 with a left-sided diabetic foot infection secondary to Morganella morganii as well as Enterococcus faecalis. The patient reports that the wound had healed/closed about 2 weeks ago. [facility] subsequently developed soft tissue swelling and an abscess which started to drain spontaneously and he reports having 2 open wounds on the plantar aspect of his foot. The patient went to see his podiatrist for follow-up and it was recommended that he be sent into the hospital for admission to receive IV antibiotic therapy and possible surgical drainage. He did not report any subjective fevers or chills.  He has had some arthralgias and myalgias in the left foot with some swelling and redness as well as to the draining ulcerations form. The patient was admitted with a left-sided diabetic foot infection and has been seen by the podiatry service and there was concern for a soft tissue abscess with associated osteomyelitis  Patient was started on cefepime and Zyvox  The patient was seen by the vascular surgery team did undergo vascular studies and though there was some calcinosis there was adequate peripheral blood flow.     The patient had an MRI of the foot done that suggested osteomyelitis of the proximal phalanx as well as of the sesamoids of the left foot.  The patient was taken to the operating room today 10/21/2020 and underwent amputation of the hallux, removal of the tibial sesamoid, removal of the fibular sesamoid, with bone biopsy of the first metatarsal.    Current evaluation:10/28/2020    BP (!) 101/48   Pulse 63   Temp 97.7 °F (36.5 °C) (Oral)   Resp 18   Ht 6' 4\" (1.93 m)   Wt (!) 375 lb (170.1 kg)   SpO2 94%   BMI 45.65 kg/m²     Temperature Range: Temp: 97.7 °F (36.5 °C) Temp  Av.9 °F (36.6 °C)  Min: 97.7 °F (36.5 °C)  Max: 98.1 °F (36.7 °C)    The patient was seen and evaluated sitting up at bedside. States he feels fine. Denies any pain. Does have a right upper extremity PICC line in place. No fevers or chills. Review of Systems   Constitutional: Negative. HENT: Negative. Respiratory: Negative. Cardiovascular: Negative. Gastrointestinal: Negative. Genitourinary: Negative. Musculoskeletal:        Right upper extremity PICC line in place   Skin: Negative. Neurological: Negative. Psychiatric/Behavioral: Negative. Physical Examination :     Physical Exam  Constitutional:       General: He is not in acute distress. Appearance: Normal appearance. He is normal weight. HENT:      Head: Normocephalic and atraumatic. Neck:      Musculoskeletal: Normal range of motion and neck supple. Cardiovascular:      Rate and Rhythm: Normal rate and regular rhythm. Pulmonary:      Effort: Pulmonary effort is normal. No respiratory distress. Breath sounds: Normal breath sounds. Abdominal:      General: Abdomen is flat. Palpations: Abdomen is soft. Musculoskeletal:      Comments: Left foot with dressing in place, not removed. Right upper extremity PICC line is in place. Skin:     General: Skin is warm and dry. Neurological:      General: No focal deficit present. Mental Status: He is alert and oriented to person, place, and time. Mental status is at baseline. Psychiatric:         Mood and Affect: Mood normal.         Behavior: Behavior normal.         Laboratory data:   I have independently reviewed the followinglabs:  CBC with Differential:   Recent Labs     10/27/20  0553 10/28/20  0501   WBC 6.9 8.1   HGB 13.2 12.9*   HCT 41.1 39.7*    290   LYMPHOPCT 31 32   MONOPCT 9 9     BMP:   Recent Labs     10/27/20  0553 10/28/20  0552    137   K 4.4 4.6    101   CO2 28 28   BUN 20 27*   CREATININE 1.31* 1.40*     Hepatic Function Panel: No results for input(s): PROT, LABALBU, BILIDIR, IBILI, BILITOT, ALKPHOS, ALT, AST in the last 72 hours.       No results found for: PROCAL  Lab Results   Component Value Date    CRP 94.2 10/23/2020    .9 10/21/2020    .1 10/19/2020     Lab Results   Component Value Date    SEDRATE 80 (H) 10/19/2020         No results found for: DDIMER  No results found for: FERRITIN  No results found for: LDH  No results found for: FIBRINOGEN    Results in Past 30 Days  Result Component Current Result Ref Range Previous Result Ref Range   SARS-CoV-2      (10/19/2020)  Not in Time Range          (10/19/2020)       Not Detected (10/19/2020) Not Detected       Lab Results   Component Value Date    COVID19 Not Detected 10/19/2020       No results for input(s): VANCOTROUGH in the last 72 hours. Imaging Studies:   No new imaging    Cultures:     Culture, Anaerobic and Aerobic [[id number]]  (Abnormal)  Collected: 10/21/20 1015    Order Status: Completed  Specimen: Bone from Foot  Updated: 10/27/20 2202     Specimen Description  . FOOT LEFT 1ST BONE     Special Requests  NOT REPORTED     Direct Exam  NO NEUTROPHILS SEEN      NO BACTERIA SEEN     Culture  STAPHYLOCOCCUS SPECIES, COAGULASE NEGATIVE 1 COLONY FORMING UNIT Susceptibilities have not been performed.  Notify the laboratory within 7 days for further workup if clinically indicated. Abnormal        NO ANAEROBIC ORGANISMS ISOLATED AT 5 DAYSAbnormal      Culture, Anaerobic and Aerobic [[id number]]  (Abnormal)   Collected: 10/21/20 1016    Order Status: Completed  Specimen: Bone from Foot  Updated: 10/26/20 1719     Specimen Description  . FOOT LEFT SESMOID     Special Requests  NOT REPORTED     Direct Exam  FEW NEUTROPHILSAbnormal        FEW GRAM POSITIVE COCCI IN PAIRS AND CLUSTERSAbnormal       Culture  ENTEROCOCCUS FAECALIS LIGHT [address]        NORMAL CHHAYA      [address]      Culture, Anaerobic and Aerobic [[id number]]  (Abnormal)   Collected: 10/19/20 2008    Order Status: Completed  Specimen: Foot  Updated: 10/24/20 1711     Specimen Description  . FOOT LEFT FOOT     Special Requests  NOT REPORTED     Direct Exam  FEW NEUTROPHILSAbnormal        FEW GRAM POSITIVE COCCI IN CLUSTERSAbnormal        RARE GRAM NEGATIVE RODSAbnormal       Culture  ENTEROCOCCUS FAECALIS LIGHT GROWTHAbnormal        NORMAL CHHAYA      FINEGOLDIA MAGNA LIGHT GROWTHAbnormal           Medications:      insulin glargine  45 Units Subcutaneous BID    enoxaparin  40 mg Subcutaneous BID    sodium chloride flush  10 mL Intravenous 2 times per day    ampicillin-sulbactam  3 g Intravenous Q6H    insulin lispro  0-12 Units Subcutaneous TID WC    insulin lispro  0-6 Units Subcutaneous Nightly    allopurinol  300 mg Oral Daily    gabapentin  300 mg Oral BID    magnesium oxide  400 mg Oral Daily    therapeutic multivitamin-minerals  1 tablet Oral Daily    sodium chloride flush  10 mL Intravenous 2 times per day           [facility]  [address] [place] [address]  [employer] messaging  OFFICE: [phone]      Electronically signed by [address] [place], [place] [address]LIBBY CNP on 10/28/2020 at 1:55 PM  Thank you for allowing us to participate in the care of this patient. Please call with questions. This note iscreated with the assistance of a speech recognition program.  While intending to generate a document that actually reflects the content of the visit, the document can still have some errors including those of syntax andsound a like substitutions which may escape proof reading. In such instances, actual meaning can be extrapolated by contextual diversion.

## 2020-10-29 VITALS
OXYGEN SATURATION: 93 % | TEMPERATURE: 98.1 F | WEIGHT: 315 LBS | HEART RATE: 69 BPM | SYSTOLIC BLOOD PRESSURE: 122 MMHG | RESPIRATION RATE: 16 BRPM | HEIGHT: 76 IN | BODY MASS INDEX: 38.36 KG/M2 | DIASTOLIC BLOOD PRESSURE: 69 MMHG

## 2020-10-29 LAB
ABSOLUTE EOS #: 0.36 K/UL (ref 0–0.44)
ABSOLUTE IMMATURE GRANULOCYTE: 0.16 K/UL (ref 0–0.3)
ABSOLUTE LYMPH #: 2.24 K/UL (ref 1.1–3.7)
ABSOLUTE MONO #: 0.71 K/UL (ref 0.1–1.2)
BASOPHILS # BLD: 1 % (ref 0–2)
BASOPHILS ABSOLUTE: 0.07 K/UL (ref 0–0.2)
DIFFERENTIAL TYPE: ABNORMAL
EOSINOPHILS RELATIVE PERCENT: 5 % (ref 1–4)
GLUCOSE BLD-MCNC: 209 MG/DL (ref 75–110)
GLUCOSE BLD-MCNC: 230 MG/DL (ref 75–110)
GLUCOSE BLD-MCNC: 271 MG/DL (ref 75–110)
HCT VFR BLD CALC: 40.1 % (ref 40.7–50.3)
HEMOGLOBIN: 13 G/DL (ref 13–17)
IMMATURE GRANULOCYTES: 2 %
LYMPHOCYTES # BLD: 29 % (ref 24–43)
MCH RBC QN AUTO: 29.8 PG (ref 25.2–33.5)
MCHC RBC AUTO-ENTMCNC: 32.4 G/DL (ref 28.4–34.8)
MCV RBC AUTO: 92 FL (ref 82.6–102.9)
MONOCYTES # BLD: 9 % (ref 3–12)
NRBC AUTOMATED: 0 PER 100 WBC
PDW BLD-RTO: 12.4 % (ref 11.8–14.4)
PLATELET # BLD: 302 K/UL (ref 138–453)
PLATELET ESTIMATE: ABNORMAL
PMV BLD AUTO: 8.8 FL (ref 8.1–13.5)
RBC # BLD: 4.36 M/UL (ref 4.21–5.77)
RBC # BLD: ABNORMAL 10*6/UL
SEG NEUTROPHILS: 54 % (ref 36–65)
SEGMENTED NEUTROPHILS ABSOLUTE COUNT: 4.22 K/UL (ref 1.5–8.1)
WBC # BLD: 7.8 K/UL (ref 3.5–11.3)
WBC # BLD: ABNORMAL 10*3/UL

## 2020-10-29 PROCEDURE — 2580000003 HC RX 258: Performed by: RADIOLOGY

## 2020-10-29 PROCEDURE — APPSS45 APP SPLIT SHARED TIME 31-45 MINUTES: Performed by: NURSE PRACTITIONER

## 2020-10-29 PROCEDURE — 6370000000 HC RX 637 (ALT 250 FOR IP): Performed by: STUDENT IN AN ORGANIZED HEALTH CARE EDUCATION/TRAINING PROGRAM

## 2020-10-29 PROCEDURE — 2580000003 HC RX 258: Performed by: INTERNAL MEDICINE

## 2020-10-29 PROCEDURE — 6370000000 HC RX 637 (ALT 250 FOR IP): Performed by: INTERNAL MEDICINE

## 2020-10-29 PROCEDURE — 6360000002 HC RX W HCPCS: Performed by: INTERNAL MEDICINE

## 2020-10-29 PROCEDURE — 36415 COLL VENOUS BLD VENIPUNCTURE: CPT

## 2020-10-29 PROCEDURE — 6370000000 HC RX 637 (ALT 250 FOR IP): Performed by: NURSE PRACTITIONER

## 2020-10-29 PROCEDURE — 82947 ASSAY GLUCOSE BLOOD QUANT: CPT

## 2020-10-29 PROCEDURE — 85025 COMPLETE CBC W/AUTO DIFF WBC: CPT

## 2020-10-29 PROCEDURE — 2580000003 HC RX 258: Performed by: STUDENT IN AN ORGANIZED HEALTH CARE EDUCATION/TRAINING PROGRAM

## 2020-10-29 PROCEDURE — 99239 HOSP IP/OBS DSCHRG MGMT >30: CPT | Performed by: NURSE PRACTITIONER

## 2020-10-29 RX ORDER — OXYCODONE HYDROCHLORIDE AND ACETAMINOPHEN 5; 325 MG/1; MG/1
1 TABLET ORAL EVERY 6 HOURS PRN
Qty: 12 TABLET | Refills: 0 | Status: SHIPPED | OUTPATIENT
Start: 2020-10-29 | End: 2020-11-01

## 2020-10-29 RX ADMIN — INSULIN LISPRO 9 UNITS: 100 INJECTION, SOLUTION INTRAVENOUS; SUBCUTANEOUS at 16:28

## 2020-10-29 RX ADMIN — OXYCODONE HYDROCHLORIDE 10 MG: 5 TABLET ORAL at 09:07

## 2020-10-29 RX ADMIN — ALLOPURINOL 300 MG: 300 TABLET ORAL at 09:07

## 2020-10-29 RX ADMIN — INSULIN GLARGINE 45 UNITS: 100 INJECTION, SOLUTION SUBCUTANEOUS at 09:06

## 2020-10-29 RX ADMIN — SODIUM CHLORIDE, PRESERVATIVE FREE 10 ML: 5 INJECTION INTRAVENOUS at 09:33

## 2020-10-29 RX ADMIN — INSULIN LISPRO 6 UNITS: 100 INJECTION, SOLUTION INTRAVENOUS; SUBCUTANEOUS at 11:32

## 2020-10-29 RX ADMIN — ENOXAPARIN SODIUM 40 MG: 40 INJECTION SUBCUTANEOUS at 09:07

## 2020-10-29 RX ADMIN — GABAPENTIN 300 MG: 300 CAPSULE ORAL at 09:07

## 2020-10-29 RX ADMIN — OXYCODONE HYDROCHLORIDE 10 MG: 5 TABLET ORAL at 03:00

## 2020-10-29 RX ADMIN — SODIUM CHLORIDE 3 G: 900 INJECTION INTRAVENOUS at 02:58

## 2020-10-29 RX ADMIN — SODIUM CHLORIDE 3 G: 900 INJECTION INTRAVENOUS at 14:55

## 2020-10-29 RX ADMIN — SODIUM CHLORIDE 3 G: 900 INJECTION INTRAVENOUS at 09:07

## 2020-10-29 RX ADMIN — MULTIPLE VITAMINS W/ MINERALS TAB 1 TABLET: TAB at 09:07

## 2020-10-29 RX ADMIN — MAGNESIUM GLUCONATE 500 MG ORAL TABLET 400 MG: 500 TABLET ORAL at 09:06

## 2020-10-29 RX ADMIN — INSULIN LISPRO 6 UNITS: 100 INJECTION, SOLUTION INTRAVENOUS; SUBCUTANEOUS at 08:10

## 2020-10-29 RX ADMIN — OXYCODONE HYDROCHLORIDE 10 MG: 5 TABLET ORAL at 14:55

## 2020-10-29 RX ADMIN — Medication 10 ML: at 09:29

## 2020-10-29 ASSESSMENT — PAIN SCALES - GENERAL
PAINLEVEL_OUTOF10: 7
PAINLEVEL_OUTOF10: 4
PAINLEVEL_OUTOF10: 4
PAINLEVEL_OUTOF10: 7
PAINLEVEL_OUTOF10: 7
PAINLEVEL_OUTOF10: 8

## 2020-10-29 ASSESSMENT — PAIN DESCRIPTION - FREQUENCY: FREQUENCY: INTERMITTENT

## 2020-10-29 ASSESSMENT — PAIN DESCRIPTION - PAIN TYPE: TYPE: ACUTE PAIN

## 2020-10-29 ASSESSMENT — PAIN DESCRIPTION - LOCATION: LOCATION: ANKLE

## 2020-10-29 ASSESSMENT — PAIN DESCRIPTION - ORIENTATION: ORIENTATION: LEFT

## 2020-10-29 ASSESSMENT — PAIN DESCRIPTION - DESCRIPTORS: DESCRIPTORS: SHARP

## 2020-10-29 NOTE — PLAN OF CARE
Problem: Falls - Risk of:  Goal: Will remain free from falls  Description: Will remain free from falls  10/29/2020 0034 by Dio Luis RN  Outcome: Ongoing  Note: Siderails up x 2  Hourly rounding  Call light in reach  Instructed to call for assist before attempting out of bed. Remains free from falls and accidental injury at this time   Floor free from obstacles  Bed is locked and in lowest position  Adequate lighting provided  Will continue to monitor.          Problem: Infection:  Goal: Will remain free from infection  Description: Will remain free from infection  10/29/2020 0034 by Dio Luis RN  Outcome: Ongoing     Problem: Safety:  Goal: Free from accidental physical injury  Description: Free from accidental physical injury  10/29/2020 0034 by Dio Luis RN  Outcome: Ongoing     Problem: Serum Glucose Level - Abnormal:  Goal: Ability to maintain appropriate glucose levels will improve  Description: Ability to maintain appropriate glucose levels will improve  10/29/2020 0034 by Dio Luis RN  Outcome: Ongoing     Problem: Sensory Perception - Impaired:  Goal: Ability to maintain a stable neurologic state will improve  Description: Ability to maintain a stable neurologic state will improve  10/29/2020 0034 by Dio Luis RN  Outcome: Ongoing

## 2020-10-29 NOTE — PROGRESS NOTES
[facility]  Office: [address], DO, Jeimy Caial, DO, Flipcolt Izzy, DO, Christen Mcfarland Blood, DO, Carol Dumont MD, Marciano Colin MD, Michaela Leger MD, Jeff Martel MD, Morgan Baxter MD, Fatmata Babin MD, Gayle iHgh MD, Maribel Camarillo MD, Shania Chan MD, Dia Pulliam, DO, Juli Yo MD, Kennedy Patterson MD, Foster Sesay, DO, Dl Arreola MD,  Fabby Gilmore DO, Robbin Devine MD, Dagoberto Lopes MD, Casev[place]oscoe Mack, [facility], [facility] David, CNP, Rhonda Barlow, [facility], Corrine Cordoba, CNS, Julianne Santana, CNP, Cesar Merrill, CNP, Della Preciado, CNP, Jessica Lima, CNP, Sumit Parents, CNP, Marivel Carter PA-C, Rohini Donald, [facility], Jay Ortega, CNP, Hugo Phoenix, CNP, Timothy Bazan, CNP, Sona Galloway, CNP, Jacklyn Frankel, [facility]    Progress Note    10/29/2020    10:23 AM    Name:   Simran Garcia  MRN:     [id number]     [place]:      [de-identified]   Room:   2021/2021-01  IP Day:  10  Admit Date:  10/19/2020  5:42 PM    PCP:   Perfecto Wade MD  Code Status:  Full Code    Subjective:     C/C:   Chief Complaint   Patient presents with    Cellulitis     left foot     Interval History Status: not changed. No acute events overnight. Patient was seen and examined at bedside. Reports that his pain in his left foot is well-controlled. Tolerating diet. PICC line in right upper arm. Glucoses remain elevated. However, patient notes that his glucoses were running anywhere between 300-500 prior to hospitalization. A1c sent. Orders provided for increase Lantus at home. Patient will require outpatient management and collaborative participation between pharmacy and PCP as well as diabetic education.     Brief History:      The patient reports to the hospital with complaint of worsening left foot wound.  Patient reports that he had a chronic left foot wound was under the care of Dr. Dominique Wilson with podiatry.  He also endorses a fracture to his left great toe that is not new. He states that his wound had previously closed and that approximately 7-10 days ago he noticed increased redness, swelling and an open area to his left foot.  He was prescribed ciprofloxacin 1 week ago and did not see any improvement.  He was seen by Dr. Rudi Rose with podiatry who encouraged him to come to the ER for further evaluation.  The patient endorses neuropathy to his bilateral feet, he does report some pain to his left foot that he describes as dull, intermittent and moderate in intensity.  It is aggravated with movement.  No definitive alleviating factors.  He denies fever, chills, nausea or vomiting.  No additional symptomology. Faisal Gonzalez has past medical history that includes type 2 diabetes, CKD and RAÚL with nightly BiPAP use.     Creatinine 1.51, WBC 11.1, glucose 280, .1, sed rate 86.\"    He is now status post great toe amputation and bone biopsy of the first metatarsal on the left foot on 10/21/2020.    10/28 - Glucoses out of control, Lantus increased, sliding scale increased. Add A1C. On Unasyn, awaiting placement. PT OT to reevaluate patient. Patient may be discharged home. This will determine later today. 10/29 -arrangements are made for assistive devices to be sent to the home. Patient will be discharged to home. Review of Systems:     Constitutional:  negative for chills, fevers, sweats  Respiratory:  negative for cough, dyspnea on exertion, shortness of breath, wheezing  Cardiovascular:  negative for chest pain, chest pressure/discomfort, lower extremity edema, palpitations  Gastrointestinal:  negative for abdominal pain, constipation, diarrhea, nausea, vomiting   Neurological:  negative for dizziness, headache    Medications:      Allergies:  No Known Allergies    Current Meds:   Scheduled Meds:    insulin lispro  0-18 Units Subcutaneous TID     insulin lispro  0-9 Units Subcutaneous Nightly    insulin glargine  45 Units Subcutaneous BID    enoxaparin  40 mg Subcutaneous BID    sodium chloride flush  10 mL Intravenous 2 times per day    ampicillin-sulbactam  3 g Intravenous Q6H    allopurinol  300 mg Oral Daily    gabapentin  300 mg Oral BID    magnesium oxide  400 mg Oral Daily    therapeutic multivitamin-minerals  1 tablet Oral Daily    sodium chloride flush  10 mL Intravenous 2 times per day     Continuous Infusions:    dextrose       PRN Meds: sodium chloride flush, oxyCODONE **OR** oxyCODONE, LORazepam, melatonin, sodium chloride flush, potassium chloride **OR** potassium alternative oral replacement **OR** potassium chloride, magnesium sulfate, acetaminophen **OR** acetaminophen, polyethylene glycol, promethazine **OR** ondansetron, nicotine, glucose, dextrose, glucagon (rDNA), dextrose, morphine    Data:     Past Medical History:   has a past medical history of Chronic kidney disease, Diabetes mellitus ([facility] [address] Diabetic neuropathy ([facility] [address] History of kidney stones, and Sleep apnea. Social History:   reports that he has never smoked. He has never used smokeless tobacco. He reports current alcohol use. He reports that he does not use drugs. Family History:   Family History   Problem Relation Age of Onset    Heart Disease Mother     Heart Disease Father        Vitals:  /69   Pulse 69   Temp 98.1 °F (36.7 °C) (Oral)   Resp 16   Ht 6' 4\" (1.93 m)   Wt (!) 378 lb (171.5 kg)   SpO2 93%   BMI 46.01 kg/m²   Temp (24hrs), Av.8 °F (36.6 °C), Min:97.5 °F (36.4 °C), Max:98.1 °F (36.7 °C)    Recent Labs     10/28/20  1113 10/28/20  1628 10/28/20  2037 10/29/20  0612   POCGLU 226* 288* 274* 230*       I/O (24Hr):     Intake/Output Summary (Last 24 hours) at 10/29/2020 1023  Last data filed at 10/29/2020 0720  Gross per 24 hour   Intake --   Output 2625 ml   Net -2625 ml       Labs:  Hematology:  Recent Labs     10/27/20  0553 10/28/20  0501 10/29/20  0641   WBC 6.9 8.1 7.8   RBC 4.45 4.27 4.36   HGB 13.2 lesions, rashes, induration    Assessment:        Hospital Problems           Last Modified POA    * (Principal) Non-healing ulcer of foot, left, with unspecified severity ([facility] [address] 10/28/2020 Yes    Type 2 diabetes mellitus with hyperglycemia, with long-term current use of insulin ([facility] [address] 10/28/2020 Yes    Diabetic autonomic neuropathy associated with type 2 diabetes mellitus ([facility] [address] 10/28/2020 Yes    Obstructive sleep apnea 10/28/2020 Yes    Class 3 severe obesity due to excess calories with serious comorbidity and body mass index (BMI) of 45.0 to 49.9 in adult [facility]) 10/28/2020 Yes    CKD (chronic kidney disease) stage 3, GFR 30-59 ml/min 10/28/2020 Yes          Plan:        1. Recommendations from podiatry and infectious disease are reviewed and will be continued  2. Needs 6 weeks of Unasyn, PICC line present  3. Continue Lantus at current rate and increase sliding scale, restart Metformin at renal dosage and discontinue glipizide  4. Continue CPAP therapy for RAÚL  5. Continue to work with PT/OT  6. Discharge to home with assist once home health devices can be arranged.     LIBBY Olvera NP  10/29/2020  10:23 AM

## 2020-10-29 NOTE — PROGRESS NOTES
Writer reviewed discharge instructions with patient. Patient verbalize understanding. Patient discharge with belongings, wheelchair, dressing supplies,hibiclens script for Percocet, Unasyn, Metformin and Lantus.

## 2020-10-29 NOTE — CARE COORDINATION
Social work: Met with patient at bedside and informed him Susan received authorization. Patient states he prefers to go home, states he is confident in his ability to manage antibiotics and has done dressing changes before as well. Susan updated- plan at this time is home with c and antibiotics.

## 2020-10-29 NOTE — CARE COORDINATION
Call back from Raquel Gottron at [facility] and they can accept pt and start of care will be tonight at 9pm for IV Unasyn.   F/U appointment scheduled with Dr. Mo Fox 11-5-20 at 9:45am.

## 2020-10-29 NOTE — DISCHARGE SUMMARY
poor     Discharged Condition: fair    Hospital Stay:     Hospital Course:  Shoaib Garcia is a 63 y.o. male who was admitted for the management of  Non-healing ulcer of foot, left, with unspecified severity ([address] , presented to ER with Cellulitis (left foot)       The patient reports to the hospital with complaint of worsening left foot wound.  Patient reports that he had a chronic left foot wound was under the care of Dr. Mo Fox with podiatry.  He also endorses a fracture to his left great toe that is not new. He states that his wound had previously closed and that approximately 7-10 days ago he noticed increased redness, swelling and an open area to his left foot.  He was prescribed ciprofloxacin 1 week ago and did not see any improvement.  He was seen by Dr. Jessica Dominguez with podiatry who encouraged him to come to the ER for further evaluation.  The patient endorses neuropathy to his bilateral feet, he does report some pain to his left foot that he describes as dull, intermittent and moderate in intensity.  It is aggravated with movement.  No definitive alleviating factors.  He denies fever, chills, nausea or vomiting.  No additional symptomology. Anay Chao has past medical history that includes type 2 diabetes, CKD and RAÚL with nightly BiPAP use.     Creatinine 1.51, WBC 11.1, glucose 280, .1, sed rate 86. \"     He is now status post great toe amputation and bone biopsy of the first metatarsal on the left foot on 10/21/2020.     Glucoses out of control, Lantus increased, sliding scale increased. Add A1C.     On Unasyn, awaiting placement.       10/28 - PT OT to reevaluate patient. Patient may be discharged home. Patient was able to work with therapy and they recommended he can be safely discharged home. Patient's blood sugars have still been elevated. Patient has had his Lantus increased. We will restart Metformin at the renally adjusted dose. We will discontinue glipizide as we are increasing the Lantus. Patient will also be given a referral for outpatient med management with pharmacy team will work collaboratively with his PCP to address his poorly controlled sugar diabetes. Arrangements are made for the patient to receive a walker/wheelchair in the home. And patient will also receive IV antibiotics in the home as well. Outpatient follow-up with podiatry and infectious disease are recommended and contacts are given to the patient. Outpatient order also placed for [facility] for poorly controlled diabetes. 10/29 -there is apparently some difficulty arranging the appropriate assistive devices for his home and patient therefore remain in the hospital overnight. Patient is stable for discharge today. Reinforced again the need to follow-up with PCP to get better control of his blood sugars. Significant therapeutic interventions: Podiatry consultation and surgical intervention. Infection control intervention assessment and IV antibiotics. Glycemic control and alterations to home medication of diabetes medications.     Significant Diagnostic Studies:   Labs / Micro:  CBC:   Lab Results   Component Value Date    WBC 7.8 10/29/2020    RBC 4.36 10/29/2020    RBC 4.85 09/23/2011    HGB 13.0 10/29/2020    HCT 40.1 10/29/2020    MCV 92.0 10/29/2020    MCH 29.8 10/29/2020    MCHC 32.4 10/29/2020    RDW 12.4 10/29/2020     10/29/2020     09/23/2011     BMP:    Lab Results   Component Value Date    GLUCOSE 203 10/28/2020    GLUCOSE 198 09/23/2011     10/28/2020    K 4.6 10/28/2020     10/28/2020    CO2 28 10/28/2020    ANIONGAP 8 10/28/2020    BUN 27 10/28/2020    CREATININE 1.40 10/28/2020    BUNCRER 19 10/28/2020    CALCIUM 9.9 10/28/2020    LABGLOM 52 10/28/2020    GFRAA >60 10/28/2020    GFR      10/28/2020    GFR NOT REPORTED 10/28/2020     PT/INR:    Lab Results   Component Value Date    PROTIME 13.9 10/20/2020    INR 1.1 10/20/2020     U/A:  No results found for: Valentina Urias [facility], [address] [facility], [address] [facility], Amie Delgado, [address] [place] [address], [address] [place] [address], [facility]     Radiology:  Ir Insert Picc Vad W Sq Port >5 Years    Result Date: 10/26/2020  Successful ultrasound and fluoroscopy guided right basilic vein 5 Yoruba power injectable dual-lumen PICC placement. Ready for use. Consultations:    Consults:     Final Specialist Recommendations/Findings:   IP CONSULT TO INTERNAL MEDICINE  IP CONSULT TO PODIATRY  IP CONSULT TO INFECTIOUS DISEASES  IP CONSULT TO VASCULAR SURGERY      The patient was seen and examined on day of discharge and this discharge summary is in conjunction with any daily progress note from day of discharge. Discharge plan:     Disposition: Home    Physician Follow Up: Juliet Ruiz, [address] [facility] [address]  [address] [place] [address],[address]  [address]  [phone]    In 1 week  For wound re-check    [address] [facility]  [phone]      for wheelchair. [place]  [phone]      IV medication       Requiring Further Evaluation/Follow Up POST HOSPITALIZATION/Incidental Findings: Poorly controlled diabetes. A1c results pending. Diet: diabetic diet    Activity: As tolerated    Instructions to Patient: Take the medications exactly as they are prescribed. Take insulin twice a day and check your blood sugars 4 times a day. Adhere to your carb controlled diabetic diet. Restart the Metformin but discontinue the glipizide.     Discharge Medications:      Medication List      START taking these medications    ampicillin-sulbactam  infusion  Commonly known as:  UNASYN  Infuse 3,000 mg intravenously every 6 hours Compound per protocol     metFORMIN 500 MG tablet  Commonly known as:  GLUCOPHAGE  Take 1 tablet by mouth 2 times daily (with meals)     oxyCODONE-acetaminophen 5-325 MG per tablet  Commonly known as:  PERCOCET  Take 1 tablet by mouth every 6 hours as needed for Pain for up to 7 days.   Replaces:  Percocet  MG per tablet        CHANGE how you take these medications    insulin glargine 100 UNIT/ML injection vial  Commonly known as:  Lantus  Inject 40 Units into the skin 2 times daily  What changed:  when to take this        CONTINUE taking these medications    allopurinol 300 MG tablet  Commonly known as:  ZYLOPRIM     Blue-Emu Super Strength Crea     diclofenac 75 MG EC tablet  Commonly known as:  VOLTAREN     diclofenac sodium 1 % Gel  Commonly known as:  VOLTAREN     Flonase 50 MCG/ACT nasal spray  Generic drug:  fluticasone     gabapentin 300 MG capsule  Commonly known as:  NEURONTIN     Handicap Placard Misc  by Does not apply route Dispense one temporary handicap placard for six months for ease of transportation     magnesium 250 MG Tabs tablet  Commonly known as:  MAGNESIUM-OXIDE     melatonin 5 MG Tabs tablet     therapeutic multivitamin-minerals tablet     zaleplon 10 MG capsule  Commonly known as:  SONATA        STOP taking these medications    diclofenac 50 MG tablet  Commonly known as:  CATAFLAM     glipiZIDE-metformin 5-500 MG per tablet  Commonly known as:  METAGLIP     Lidocaine 4 % Gel     Percocet  MG per tablet  Generic drug:  oxyCODONE-acetaminophen  Replaced by:  oxyCODONE-acetaminophen 5-325 MG per tablet     Victoza 18 MG/3ML Sopn SC injection  Generic drug:  Liraglutide           Where to Get Your Medications      These medications were sent to [address], [address]  [address] [place] [address], [address] [place] [address] Street [place] [address]    Phone:  [phone]   · insulin glargine 100 UNIT/ML injection vial  · metFORMIN 500 MG tablet     You can get these medications from any pharmacy    Bring a paper prescription for each of these medications  · ampicillin-sulbactam  infusion  · oxyCODONE-acetaminophen 5-325 MG per tablet         Discharge Procedure [facility] Medication Mgmt (Diabetes - Clinical Pharmacy) - Judie Leach   Referral Priority: Routine Referral Type: Consult for Advice and Opinion   Referral Reason: Specialty Services Required   Requested Specialty: Pharmacist   Number of Visits Requested: 1 Expiration Date: 10/28/22     DME Order for Wheel Chair as OP   Order Comments: You must complete the order parameters below and add the medical necessity documentation for this DME in a separate note. Extra heavy duty wheelchair patient weight >300 lbs    Elevating Leg Rest Pair and Articulating legrest right and left      Current body weight: Weight: (!) 375 lb (170.1 kg)  Current patient height: Height: 6' 4\" (193 cm)  Diagnosis: osteomyelitis  Length of need: 3 Months       Time Spent on discharge is  20 mins in patient examination, evaluation, counseling as well as medication reconciliation, prescriptions for required medications, discharge plan and follow up. Electronically signed by   LIBBY Lenz NP  10/29/2020  10:49 AM      Thank you Dr. Mamadou Ace MD for the opportunity to be involved in this patient's care.

## 2020-10-29 NOTE — CARE COORDINATION
Call to [facility] from [facility] with referral.  Will call back when insurance verified. Prisca from [facility] informed first dose of Unasyn will be 9pm tonight.   F/U appointment with Dr. Jazzy Garcia scheduled 11-25-20 at 9:45am.

## 2020-10-29 NOTE — PLAN OF CARE
Problem: Falls - Risk of:  Goal: Will remain free from falls  Description: Will remain free from falls  10/29/2020 0034 by Carlos Lovell RN  Outcome: Ongoing  Note: Siderails up x 2  Hourly rounding  Call light in reach  Instructed to call for assist before attempting out of bed. Remains free from falls and accidental injury at this time   Floor free from obstacles  Bed is locked and in lowest position  Adequate lighting provided  Will continue to monitor.          Problem: Serum Glucose Level - Abnormal:  Goal: Ability to maintain appropriate glucose levels will improve  Description: Ability to maintain appropriate glucose levels will improve  10/29/2020 1229 by Luda Giraldo RN  Outcome: Ongoing  10/29/2020 0034 by Carlos Lovell RN  Outcome: Ongoing

## 2020-10-30 ENCOUNTER — CARE COORDINATION (OUTPATIENT)
Dept: CASE MANAGEMENT | Age: 60
End: 2020-10-30

## 2020-10-30 NOTE — CARE COORDINATION
[facility] Transitions Initial Follow Up Call- COVID risk follow up call       Call within 2 business days of discharge: Yes    Patient: Brenda Geronimo Patient : 1960   MRN: [id number]  Reason for Admission: non healing ulcer- L (partial amputation)  Discharge Date: 10/29/20 RARS: Readmission Risk Score: 13      Last Discharge [employer]       Complaint Diagnosis Description Type Department Provider    10/19/20 Cellulitis Diabetic ulcer of left foot associated with type 2 diabetes mellitus, with other ulcer severity, unspecified part of foot ([facility] [address] [facility] . .. ED to Hosp-Admission (Discharged) (ADMITTED) Beatriz Luther DO; Elder Breeding . .. Spoke with: Vernestine Pearson     Call to pt who states he is doing \"good\". States foot dressing is intact without drainage. States pain 7/10 in foot but taking the pain med as needed   States IV abx supplies have been delivered and home care should be there this afternoon  Denies SOB, fever/ chills      Challenges to be reviewed by the provider   Additional needs identified to be addressed with provider No  none    Discussed COVID-19 related testing which was available at this time. Test results were negative. Patient informed of results, if available? Yes         Method of communication with provider : none    Advance Care Planning:   Does patient have an Advance Directive:  not on file; education provided. Was this a readmission? No  Patient stated reason for admission: foot wound   Patients top risk factors for readmission: lack of knowledge about disease and medical condition    Care Transition Nurse (CTN) contacted the patient by telephone to perform post hospital discharge assessment. Verified name and  with patient as identifiers. Provided introduction to self, and explanation of the CTN role. CTN reviewed discharge instructions, medical action plan and red flags with patient who verbalized understanding.  Patient given an opportunity to ask questions and does not have any further questions or concerns at this time. Were discharge instructions available to patient? Yes. Reviewed appropriate site of care based on symptoms and resources available to patient including: PCP, Specialist and ctn. The patient agrees to contact the PCP office for questions related to their healthcare. Medication reconciliation was performed with patient, who verbalizes understanding of administration of home medications. Advised obtaining a 90-day supply of all daily and as-needed medications. Covid Risk Education    Patient has following risk factors of: diabetes and chronic kidney disease. Education provided regarding infection prevention, and signs and symptoms of COVID-19 and when to seek medical attention with patient who verbalized understanding. Discussed exposure protocols and quarantine From CDC: Are you at higher risk for severe illness?   and given an opportunity for questions and concerns. The patient agrees to contact the COVID-19 hotline 267-800-8419 or PCP office for questions related to COVID-19. For more information on steps you can take to protect yourself, see CDC's How to Protect Yourself     Patient/family/caregiver given information for Nini Dacosta and agrees to enroll no  Patient's preferred e-mail: declines  Patient's preferred phone number: declines    Discussed follow-up appointments. If no appointment was previously scheduled, appointment scheduling offered: Yes. Is follow up appointment scheduled within 7 days of discharge? Yes  Non[facility] follow up appointment(s): Jewell Lovell- 11/5 at [address] [place] [address], [address] [place] [address] 11/25 at [address] [place] [address] for follow-up call in 10-14 days based on severity of symptoms and risk factors. Plan for next call: symptom management-foot wound/ healing/ pain  and routine   CTN provided contact information for future needs.            Facility: [unfilled]    Non-face-to-face services provided:  Scheduled appointment with Specialist-confirmed appts  Obtained and reviewed discharge summary and/or continuity of care documents  Communication with home health agencies or other community services the patient is currently using-confirmed [facility] today   Assessment and support for treatment adherence and medication management-confirmed new and changed meds    Care Transitions 24 Hour Call    Do you have any ongoing symptoms?:  No  Do you have a copy of your discharge instructions?:  Yes  Do you have all of your prescriptions and are they filled?:  Yes  Have you been contacted by a [address]?:  No  Have you scheduled your follow up appointment?:  Yes  How are you going to get to your appointment?:  Car - family or friend to transport  Were you discharged with any Home Care or Post Acute Services:  Yes  Post Acute Services:  Home Health (Comment[facility]  Do you feel like you have everything you need to keep you well at home?:  Yes  Care Transitions Interventions         Follow Up  Future Appointments   Date Time Provider Toshia Villarreal   11/25/2020  9:45 AM Kiko Mccormick MD INFT DISEASE Ramon Stark RN

## 2020-11-05 ENCOUNTER — TELEPHONE (OUTPATIENT)
Dept: PHARMACY | Age: 60
End: 2020-11-05

## 2020-11-10 ENCOUNTER — HOSPITAL ENCOUNTER (OUTPATIENT)
Facility: CLINIC | Age: 60
Setting detail: SPECIMEN
Discharge: HOME | End: 2020-11-10
Payer: COMMERCIAL

## 2020-11-10 LAB
ABSOLUTE EOS #: 0.2 K/UL (ref 0–0.44)
ABSOLUTE IMMATURE GRANULOCYTE: <0.03 K/UL (ref 0–0.3)
ABSOLUTE LYMPH #: 1.86 K/UL (ref 1.1–3.7)
ABSOLUTE MONO #: 0.63 K/UL (ref 0.1–1.2)
ANION GAP SERPL CALCULATED.3IONS-SCNC: 16 MMOL/L (ref 9–17)
BASOPHILS # BLD: 0 % (ref 0–2)
BASOPHILS ABSOLUTE: <0.03 K/UL (ref 0–0.2)
BUN BLDV-MCNC: 18 MG/DL (ref 8–23)
BUN/CREAT BLD: ABNORMAL (ref 9–20)
C-REACTIVE PROTEIN: 7.2 MG/L (ref 0–5)
CALCIUM SERPL-MCNC: 9.7 MG/DL (ref 8.6–10.4)
CHLORIDE BLD-SCNC: 107 MMOL/L (ref 98–107)
CO2: 20 MMOL/L (ref 20–31)
CREAT SERPL-MCNC: 1.48 MG/DL (ref 0.7–1.2)
DIFFERENTIAL TYPE: NORMAL
EOSINOPHILS RELATIVE PERCENT: 3 % (ref 1–4)
GFR AFRICAN AMERICAN: 59 ML/MIN
GFR NON-AFRICAN AMERICAN: 48 ML/MIN
GFR SERPL CREATININE-BSD FRML MDRD: ABNORMAL ML/MIN/{1.73_M2}
GFR SERPL CREATININE-BSD FRML MDRD: ABNORMAL ML/MIN/{1.73_M2}
GLUCOSE BLD-MCNC: 175 MG/DL (ref 70–99)
HCT VFR BLD CALC: 42.8 % (ref 40.7–50.3)
HEMOGLOBIN: 13.8 G/DL (ref 13–17)
IMMATURE GRANULOCYTES: 0 %
LYMPHOCYTES # BLD: 28 % (ref 24–43)
MCH RBC QN AUTO: 30.3 PG (ref 25.2–33.5)
MCHC RBC AUTO-ENTMCNC: 32.2 G/DL (ref 28.4–34.8)
MCV RBC AUTO: 93.9 FL (ref 82.6–102.9)
MONOCYTES # BLD: 10 % (ref 3–12)
NRBC AUTOMATED: 0 PER 100 WBC
PDW BLD-RTO: 13.7 % (ref 11.8–14.4)
PLATELET # BLD: 221 K/UL (ref 138–453)
PLATELET ESTIMATE: NORMAL
PMV BLD AUTO: 10.1 FL (ref 8.1–13.5)
POTASSIUM SERPL-SCNC: 4.3 MMOL/L (ref 3.7–5.3)
RBC # BLD: 4.56 M/UL (ref 4.21–5.77)
RBC # BLD: NORMAL 10*6/UL
SEG NEUTROPHILS: 59 % (ref 36–65)
SEGMENTED NEUTROPHILS ABSOLUTE COUNT: 3.84 K/UL (ref 1.5–8.1)
SODIUM BLD-SCNC: 143 MMOL/L (ref 135–144)
WBC # BLD: 6.6 K/UL (ref 3.5–11.3)
WBC # BLD: NORMAL 10*3/UL

## 2020-11-10 PROCEDURE — 86140 C-REACTIVE PROTEIN: CPT

## 2020-11-10 PROCEDURE — 85025 COMPLETE CBC W/AUTO DIFF WBC: CPT

## 2020-11-10 PROCEDURE — 80048 BASIC METABOLIC PNL TOTAL CA: CPT

## 2020-11-12 ENCOUNTER — CARE COORDINATION (OUTPATIENT)
Dept: CASE MANAGEMENT | Age: 60
End: 2020-11-12

## 2020-11-12 NOTE — CARE COORDINATION
[facility] Trans[facility] Follow Up Call- 1st attempt final COVID risk follow up call       2020    Patient: Dennie Clinton  Patient : 1960   MRN: [id number]  Reason for Admission: non healing ulcer- L (partial amputation)   Discharge Date: 10/29/20 RARS: Readmission Risk Score: 13         Attempted to reach patient for final transitional call.   VM left to return call to [phone]    Follow Up  Future Appointments   Date Time Provider Toshia Villarreal   2020  9:45 AM [facility]   2020 10:45 AM [facility] MGMT [facility] MGMT [facility] RN

## 2020-11-12 NOTE — TELEPHONE ENCOUNTER
Received new referral for Diabetes Medication Management from [address] patient and scheduled patient for Tuesday, December 22nd at 10:45am.    Today was the 2nd attempt to schedule patient.   Ricardo Singletary

## 2020-11-16 ENCOUNTER — HOSPITAL ENCOUNTER (OUTPATIENT)
Facility: CLINIC | Age: 60
Setting detail: SPECIMEN
Discharge: HOME | End: 2020-11-16
Payer: COMMERCIAL

## 2020-11-16 LAB
ABSOLUTE EOS #: 0.26 K/UL (ref 0–0.44)
ABSOLUTE IMMATURE GRANULOCYTE: <0.03 K/UL (ref 0–0.3)
ABSOLUTE LYMPH #: 1.6 K/UL (ref 1.1–3.7)
ABSOLUTE MONO #: 0.44 K/UL (ref 0.1–1.2)
ANION GAP SERPL CALCULATED.3IONS-SCNC: 12 MMOL/L (ref 9–17)
BASOPHILS # BLD: 1 % (ref 0–2)
BASOPHILS ABSOLUTE: 0.03 K/UL (ref 0–0.2)
BUN BLDV-MCNC: 20 MG/DL (ref 8–23)
BUN/CREAT BLD: ABNORMAL (ref 9–20)
C-REACTIVE PROTEIN: 4.8 MG/L (ref 0–5)
CALCIUM SERPL-MCNC: 9.3 MG/DL (ref 8.6–10.4)
CHLORIDE BLD-SCNC: 104 MMOL/L (ref 98–107)
CO2: 23 MMOL/L (ref 20–31)
CREAT SERPL-MCNC: 1.21 MG/DL (ref 0.7–1.2)
DIFFERENTIAL TYPE: ABNORMAL
EOSINOPHILS RELATIVE PERCENT: 5 % (ref 1–4)
GFR AFRICAN AMERICAN: >60 ML/MIN
GFR NON-AFRICAN AMERICAN: >60 ML/MIN
GFR SERPL CREATININE-BSD FRML MDRD: ABNORMAL ML/MIN/{1.73_M2}
GFR SERPL CREATININE-BSD FRML MDRD: ABNORMAL ML/MIN/{1.73_M2}
GLUCOSE BLD-MCNC: 231 MG/DL (ref 70–99)
HCT VFR BLD CALC: 42.8 % (ref 40.7–50.3)
HEMOGLOBIN: 13.9 G/DL (ref 13–17)
IMMATURE GRANULOCYTES: 0 %
LYMPHOCYTES # BLD: 33 % (ref 24–43)
MCH RBC QN AUTO: 29.9 PG (ref 25.2–33.5)
MCHC RBC AUTO-ENTMCNC: 32.5 G/DL (ref 28.4–34.8)
MCV RBC AUTO: 92 FL (ref 82.6–102.9)
MONOCYTES # BLD: 9 % (ref 3–12)
NRBC AUTOMATED: 0 PER 100 WBC
PDW BLD-RTO: 13.8 % (ref 11.8–14.4)
PLATELET # BLD: 159 K/UL (ref 138–453)
PLATELET ESTIMATE: ABNORMAL
PMV BLD AUTO: 9.7 FL (ref 8.1–13.5)
POTASSIUM SERPL-SCNC: 4.5 MMOL/L (ref 3.7–5.3)
RBC # BLD: 4.65 M/UL (ref 4.21–5.77)
RBC # BLD: ABNORMAL 10*6/UL
SEG NEUTROPHILS: 52 % (ref 36–65)
SEGMENTED NEUTROPHILS ABSOLUTE COUNT: 2.52 K/UL (ref 1.5–8.1)
SODIUM BLD-SCNC: 139 MMOL/L (ref 135–144)
WBC # BLD: 4.9 K/UL (ref 3.5–11.3)
WBC # BLD: ABNORMAL 10*3/UL

## 2020-11-16 PROCEDURE — 85025 COMPLETE CBC W/AUTO DIFF WBC: CPT

## 2020-11-16 PROCEDURE — 86140 C-REACTIVE PROTEIN: CPT

## 2020-11-16 PROCEDURE — 80048 BASIC METABOLIC PNL TOTAL CA: CPT

## 2020-11-19 ENCOUNTER — CARE COORDINATION (OUTPATIENT)
Dept: CASE MANAGEMENT | Age: 60
End: 2020-11-19

## 2020-11-19 NOTE — CARE COORDINATION
[facility] Trans[facility] Follow Up Call- final COVID risk follow up call       2020    Patient: Sherri Del Rio  Patient : 1960   MRN: [id number]  Reason for Admission: non healing ulcer- L (partial amputation)    Discharge Date: 10/29/20 RARS: Readmission Risk Score: 13         Spoke with: Rachele Manuel     Call to pt who states his foot is still not great. States he will have an MRI to look at the bone. States some pain and still wearing the boot and remains NWB  Denies needs  Writer informs this is final (CTC) phone call- v/u. Encouraged call writer/ CTC or providers if questions or concerns- v/u. Episode closed      Care Transitions Subsequent and Final Call    Subsequent and Final Calls  Do you have any ongoing symptoms?:  No  Have your medications changed?:  No  Do you have any questions related to your medications?:  No  Do you currently have any active services?:  Yes  Are you currently active with any services?:  Home Health  Do you have any needs or concerns that I can assist you with?:  No  Identified Barriers:  Lack of Education  Care Transitions Interventions  Other Interventions:             Follow Up  Future Appointments   Date Time Provider Toshia Villarreal   2020  9:45 AM Tana Irby MD INFT DISEASE [facility]   2020 10:45 AM [facility] MEDICATION MGMT [facility] MED MGMT [facility] Corrina Archer RN

## 2020-11-23 ENCOUNTER — HOSPITAL ENCOUNTER (OUTPATIENT)
Facility: CLINIC | Age: 60
Setting detail: SPECIMEN
Discharge: HOME | End: 2020-11-23
Payer: COMMERCIAL

## 2020-11-23 LAB
ABSOLUTE EOS #: 0.19 K/UL (ref 0–0.44)
ABSOLUTE IMMATURE GRANULOCYTE: 0.03 K/UL (ref 0–0.3)
ABSOLUTE LYMPH #: 1.72 K/UL (ref 1.1–3.7)
ABSOLUTE MONO #: 0.65 K/UL (ref 0.1–1.2)
ANION GAP SERPL CALCULATED.3IONS-SCNC: 18 MMOL/L (ref 9–17)
BASOPHILS # BLD: 0 % (ref 0–2)
BASOPHILS ABSOLUTE: 0.03 K/UL (ref 0–0.2)
BUN BLDV-MCNC: 24 MG/DL (ref 8–23)
BUN/CREAT BLD: ABNORMAL (ref 9–20)
C-REACTIVE PROTEIN: 11.3 MG/L (ref 0–5)
CALCIUM SERPL-MCNC: 9.8 MG/DL (ref 8.6–10.4)
CHLORIDE BLD-SCNC: 103 MMOL/L (ref 98–107)
CO2: 20 MMOL/L (ref 20–31)
CREAT SERPL-MCNC: 1.14 MG/DL (ref 0.7–1.2)
DIFFERENTIAL TYPE: ABNORMAL
EOSINOPHILS RELATIVE PERCENT: 3 % (ref 1–4)
GFR AFRICAN AMERICAN: >60 ML/MIN
GFR NON-AFRICAN AMERICAN: >60 ML/MIN
GFR SERPL CREATININE-BSD FRML MDRD: ABNORMAL ML/MIN/{1.73_M2}
GFR SERPL CREATININE-BSD FRML MDRD: ABNORMAL ML/MIN/{1.73_M2}
GLUCOSE BLD-MCNC: 294 MG/DL (ref 70–99)
HCT VFR BLD CALC: 44.1 % (ref 40.7–50.3)
HEMOGLOBIN: 14.4 G/DL (ref 13–17)
IMMATURE GRANULOCYTES: 0 %
LYMPHOCYTES # BLD: 23 % (ref 24–43)
MCH RBC QN AUTO: 29.9 PG (ref 25.2–33.5)
MCHC RBC AUTO-ENTMCNC: 32.7 G/DL (ref 28.4–34.8)
MCV RBC AUTO: 91.7 FL (ref 82.6–102.9)
MONOCYTES # BLD: 9 % (ref 3–12)
NRBC AUTOMATED: 0 PER 100 WBC
PDW BLD-RTO: 13.4 % (ref 11.8–14.4)
PLATELET # BLD: 224 K/UL (ref 138–453)
PLATELET ESTIMATE: ABNORMAL
PMV BLD AUTO: 10.1 FL (ref 8.1–13.5)
POTASSIUM SERPL-SCNC: 4.9 MMOL/L (ref 3.7–5.3)
RBC # BLD: 4.81 M/UL (ref 4.21–5.77)
RBC # BLD: ABNORMAL 10*6/UL
SEG NEUTROPHILS: 66 % (ref 36–65)
SEGMENTED NEUTROPHILS ABSOLUTE COUNT: 5.02 K/UL (ref 1.5–8.1)
SODIUM BLD-SCNC: 141 MMOL/L (ref 135–144)
WBC # BLD: 7.6 K/UL (ref 3.5–11.3)
WBC # BLD: ABNORMAL 10*3/UL

## 2020-11-23 PROCEDURE — 85025 COMPLETE CBC W/AUTO DIFF WBC: CPT

## 2020-11-23 PROCEDURE — 80048 BASIC METABOLIC PNL TOTAL CA: CPT

## 2020-11-23 PROCEDURE — 86140 C-REACTIVE PROTEIN: CPT

## 2020-11-25 ENCOUNTER — OFFICE VISIT (OUTPATIENT)
Dept: INFECTIOUS DISEASES | Age: 60
End: 2020-11-25
Payer: COMMERCIAL

## 2020-11-25 VITALS
WEIGHT: 315 LBS | HEART RATE: 76 BPM | TEMPERATURE: 97.2 F | BODY MASS INDEX: 38.36 KG/M2 | DIASTOLIC BLOOD PRESSURE: 77 MMHG | OXYGEN SATURATION: 98 % | HEIGHT: 76 IN | SYSTOLIC BLOOD PRESSURE: 136 MMHG

## 2020-11-25 PROCEDURE — 99214 OFFICE O/P EST MOD 30 MIN: CPT | Performed by: INTERNAL MEDICINE

## 2020-11-25 ASSESSMENT — ENCOUNTER SYMPTOMS
ALLERGIC/IMMUNOLOGIC NEGATIVE: 1
RESPIRATORY NEGATIVE: 1
GASTROINTESTINAL NEGATIVE: 1

## 2020-11-25 NOTE — PROGRESS NOTES
[facility]  Office Progress Note  Today's Date and Time: 11/25/2020, 9:58 AM    Impression:     1. Diabetic foot infection ([address]    2. Osteomyelitis of toe of left foot ([address]    3. Open wound of plantar aspect of foot, right, sequela         Recommendations   · The patient continues on intravenous antimicrobial therapy with Unasyn through 11/30/2020. · The x-rays seem to suggest findings concerning for residual/progressing osteomyelitis in the metatarsal and the patient is scheduled for an MRI of the foot on Friday. · I have asked that his PICC line be left in place has there is consideration for further surgery depending on the MRI findings. · I have asked him to call the office and let us know the MRI findings as well as podiatry plans so we can make further decisions for him. I have ordered the following medications/ labs:  No orders of the defined types were placed in this encounter. No orders of the defined types were placed in this encounter. Chief complaint/reason for consultation:     Chief Complaint   Patient presents with    Follow-Up from Hospital     from [facility] DR KIM PLUMMER Infected diabetic foot ulcer/osteomyelitis        History of Present Illness:   Cassie Mcdaniels is a 63y.o.-year-old male who I am seeing in follow-up after recent hospital stay and he has a history of diabetes mellitus type 2 with associated neuropathy, morbid obesity, obstructive sleep apnea on CPAP, chronic kidney disease, chronic issues with ulceration on the plantar aspect of his left foot but the subfirst metatarsal and subsequently developed infection and osteomyelitis status post great toe amputation with surgical pathology consistent with acute osteomyelitis 10/21/2020. Cultures did grow out Enterococcus faecalis and the patient was discharged on IV antimicrobial therapy with Unasyn through 11/30/2020 to complete a 6-week course of therapy.     The patient now is here for follow-up and reports that he did not do well with the wound VAC initially and the plantar foot incision where he originally had the ulceration did end up having some wound dehiscence that he has had some serous and bloody drainage coming from the wound bed. The patient had an x-ray done by his podiatrist and there was concern for residual infection/osteomyelitis in the metatarsal.    The patient tells me he is scheduled for an MRI of his left foot on Friday. He does not have any subjective fevers or chills. No cough or shortness of breath. No abdominal pain nausea vomiting or diarrhea. I have personally reviewedthe past medical history, medications, social history, and I have updated the database accordingly.   Past Medical History:     Past Medical History:   Diagnosis Date    Chronic kidney disease     Diabetes mellitus ([facility]     Diabetic neuropathy ([facility]     History of kidney stones     Sleep apnea     uses Bi-Pap nightly     Medications:     Current Outpatient Medications   Medication Sig Dispense Refill    ampicillin-sulbactam (UNASYN) infusion Infuse 3,000 mg intravenously every 6 hours Compound per protocol 360 g 1    insulin glargine (LANTUS) 100 UNIT/ML injection vial Inject 40 Units into the skin 2 times daily 1 vial 3    metFORMIN (GLUCOPHAGE) 500 MG tablet Take 1 tablet by mouth 2 times daily (with meals) 180 tablet 1    diclofenac (VOLTAREN) 75 MG EC tablet Take 75 mg by mouth 2 times daily      Liniments (BLUE-EMU SUPER STRENGTH) CREA Apply topically as needed (to hands)      diclofenac sodium (VOLTAREN) 1 % GEL Apply 2 g topically 4 times daily as needed for Pain      Handicap Placard MISC by Does not apply route Dispense one temporary handicap placard for six months for ease of transportation 1 each 0    melatonin 5 MG TABS tablet Take 5-10 mg by mouth nightly       fluticasone (FLONASE) 50 MCG/ACT nasal spray 1-2 sprays by Each Nostril route daily as needed for Allergies       Multiple Vitamins-Minerals (THERAPEUTIC MULTIVITAMIN-MINERALS) tablet Take 1 tablet by mouth daily      magnesium (MAGNESIUM-OXIDE) 250 MG TABS tablet Take 250 mg by mouth daily      gabapentin (NEURONTIN) 300 MG capsule Take 300 mg by mouth 2 times daily as needed (nerve pain).  allopurinol (ZYLOPRIM) 300 MG tablet Take 300 mg by mouth daily      zaleplon (SONATA) 10 MG capsule Take 10 mg by mouth nightly       No current facility-administered medications for this visit. Allergies:   Patient has no known allergies. Review of Systems:   Review of Systems   Constitutional: Negative. Respiratory: Negative. Cardiovascular: Negative. Gastrointestinal: Negative. Genitourinary: Negative. Musculoskeletal: Negative. Skin: Positive for wound. Allergic/Immunologic: Negative. Neurological: Negative. Physical Examination :   /77 (Site: Left Upper Arm, Position: Sitting, Cuff Size: Large Adult)   Pulse 76   Temp 97.2 °F (36.2 °C)   Ht 6' 4\" (1.93 m)   Wt (!) 350 lb (158.8 kg)   SpO2 98%   BMI 42.60 kg/m²     Physical Exam  Constitutional:       Appearance: He is well-developed. He is obese. HENT:      Head: Normocephalic and atraumatic. Neck:      Musculoskeletal: Normal range of motion and neck supple. Cardiovascular:      Rate and Rhythm: Normal rate. Heart sounds: Normal heart sounds. No friction rub. No gallop. Pulmonary:      Effort: Pulmonary effort is normal.      Breath sounds: Normal breath sounds. No wheezing. Abdominal:      General: Bowel sounds are normal.      Palpations: Abdomen is soft. There is no mass. Tenderness: There is no abdominal tenderness. Musculoskeletal:      Comments: RUE PIC   Lymphadenopathy:      Cervical: No cervical adenopathy. Skin:     Comments: The left foot great toe amputation sites has a healing wound.   There is a subfirst metatarsal plantar wound which does probe down 3 cm and does have some serous

## 2020-11-27 ENCOUNTER — HOSPITAL ENCOUNTER (OUTPATIENT)
Dept: MRI IMAGING | Age: 60
Discharge: HOME | End: 2020-11-29
Payer: COMMERCIAL

## 2020-11-27 PROCEDURE — 6360000004 HC RX CONTRAST MEDICATION: Performed by: PODIATRIST

## 2020-11-27 PROCEDURE — A9579 GAD-BASE MR CONTRAST NOS,1ML: HCPCS | Performed by: PODIATRIST

## 2020-11-27 PROCEDURE — 73720 MRI LWR EXTREMITY W/O&W/DYE: CPT

## 2020-11-27 RX ADMIN — GADOTERIDOL 20 ML: 279.3 INJECTION, SOLUTION INTRAVENOUS at 09:39

## 2020-11-30 ENCOUNTER — TELEPHONE (OUTPATIENT)
Dept: INFECTIOUS DISEASES | Age: 60
End: 2020-11-30

## 2020-11-30 ENCOUNTER — HOSPITAL ENCOUNTER (OUTPATIENT)
Dept: PREADMISSION TESTING | Age: 60
Setting detail: SPECIMEN
Discharge: HOME | End: 2020-12-04
Payer: COMMERCIAL

## 2020-11-30 ENCOUNTER — HOSPITAL ENCOUNTER (OUTPATIENT)
Age: 60
Setting detail: SPECIMEN
Discharge: HOME | End: 2020-11-30
Payer: COMMERCIAL

## 2020-11-30 PROCEDURE — U0003 INFECTIOUS AGENT DETECTION BY NUCLEIC ACID (DNA OR RNA); SEVERE ACUTE RESPIRATORY SYNDROME CORONAVIRUS 2 (SARS-COV-2) (CORONAVIRUS DISEASE [COVID-19]), AMPLIFIED PROBE TECHNIQUE, MAKING USE OF HIGH THROUGHPUT TECHNOLOGIES AS DESCRIBED BY CMS-2020-01-R: HCPCS

## 2020-11-30 NOTE — TELEPHONE ENCOUNTER
Patient is calling about his MRI report and what to do with the PICC line? Surgery is scheduled for Thursday.

## 2020-11-30 NOTE — TELEPHONE ENCOUNTER
The MRI shows that he still has infection.   The PICC line will need to stay in and he will likely need more antibiotics after his surgery

## 2020-12-01 LAB
ABSOLUTE EOS #: 0.18 K/UL (ref 0–0.44)
ABSOLUTE IMMATURE GRANULOCYTE: 0.04 K/UL (ref 0–0.3)
ABSOLUTE LYMPH #: 2.32 K/UL (ref 1.1–3.7)
ABSOLUTE MONO #: 0.65 K/UL (ref 0.1–1.2)
ANION GAP SERPL CALCULATED.3IONS-SCNC: 13 MMOL/L (ref 9–17)
BASOPHILS # BLD: 1 % (ref 0–2)
BASOPHILS ABSOLUTE: 0.04 K/UL (ref 0–0.2)
BUN BLDV-MCNC: 24 MG/DL (ref 8–23)
BUN/CREAT BLD: ABNORMAL (ref 9–20)
C-REACTIVE PROTEIN: 23.2 MG/L (ref 0–5)
CALCIUM SERPL-MCNC: 10 MG/DL (ref 8.6–10.4)
CHLORIDE BLD-SCNC: 100 MMOL/L (ref 98–107)
CO2: 24 MMOL/L (ref 20–31)
CREAT SERPL-MCNC: 1.27 MG/DL (ref 0.7–1.2)
DIFFERENTIAL TYPE: ABNORMAL
EOSINOPHILS RELATIVE PERCENT: 3 % (ref 1–4)
GFR AFRICAN AMERICAN: >60 ML/MIN
GFR NON-AFRICAN AMERICAN: 58 ML/MIN
GFR SERPL CREATININE-BSD FRML MDRD: ABNORMAL ML/MIN/{1.73_M2}
GFR SERPL CREATININE-BSD FRML MDRD: ABNORMAL ML/MIN/{1.73_M2}
GLUCOSE BLD-MCNC: 271 MG/DL (ref 70–99)
HCT VFR BLD CALC: 49.2 % (ref 40.7–50.3)
HEMOGLOBIN: 15.2 G/DL (ref 13–17)
IMMATURE GRANULOCYTES: 1 %
LYMPHOCYTES # BLD: 32 % (ref 24–43)
MCH RBC QN AUTO: 29.2 PG (ref 25.2–33.5)
MCHC RBC AUTO-ENTMCNC: 30.9 G/DL (ref 28.4–34.8)
MCV RBC AUTO: 94.4 FL (ref 82.6–102.9)
MONOCYTES # BLD: 9 % (ref 3–12)
NRBC AUTOMATED: 0 PER 100 WBC
PDW BLD-RTO: 13.6 % (ref 11.8–14.4)
PLATELET # BLD: 261 K/UL (ref 138–453)
PLATELET ESTIMATE: ABNORMAL
PMV BLD AUTO: 10.2 FL (ref 8.1–13.5)
POTASSIUM SERPL-SCNC: 4.2 MMOL/L (ref 3.7–5.3)
RBC # BLD: 5.21 M/UL (ref 4.21–5.77)
RBC # BLD: ABNORMAL 10*6/UL
SEG NEUTROPHILS: 54 % (ref 36–65)
SEGMENTED NEUTROPHILS ABSOLUTE COUNT: 4.06 K/UL (ref 1.5–8.1)
SODIUM BLD-SCNC: 137 MMOL/L (ref 135–144)
WBC # BLD: 7.3 K/UL (ref 3.5–11.3)
WBC # BLD: ABNORMAL 10*3/UL

## 2020-12-01 NOTE — TELEPHONE ENCOUNTER
Nhan sullivan/Carlos notified to maintain PICC line until after surgery on Thursday. Pt is aware as well.

## 2020-12-02 ENCOUNTER — ANESTHESIA EVENT (OUTPATIENT)
Dept: OPERATING ROOM | Age: 60
End: 2020-12-02
Payer: COMMERCIAL

## 2020-12-03 ENCOUNTER — HOSPITAL ENCOUNTER (OUTPATIENT)
Age: 60
Setting detail: OBSERVATION
Discharge: HOME HEALTH | End: 2020-12-04
Attending: PODIATRIST | Admitting: PODIATRIST
Payer: COMMERCIAL

## 2020-12-03 ENCOUNTER — ANESTHESIA (OUTPATIENT)
Dept: OPERATING ROOM | Age: 60
End: 2020-12-03
Payer: COMMERCIAL

## 2020-12-03 ENCOUNTER — APPOINTMENT (OUTPATIENT)
Dept: GENERAL RADIOLOGY | Age: 60
End: 2020-12-03
Attending: PODIATRIST
Payer: COMMERCIAL

## 2020-12-03 VITALS — DIASTOLIC BLOOD PRESSURE: 69 MMHG | OXYGEN SATURATION: 98 % | SYSTOLIC BLOOD PRESSURE: 133 MMHG

## 2020-12-03 PROBLEM — M86.9 OSTEOMYELITIS (HCC): Status: ACTIVE | Noted: 2020-12-03

## 2020-12-03 LAB
ABSOLUTE EOS #: 0.14 K/UL (ref 0–0.44)
ABSOLUTE IMMATURE GRANULOCYTE: 0.03 K/UL (ref 0–0.3)
ABSOLUTE LYMPH #: 2.24 K/UL (ref 1.1–3.7)
ABSOLUTE MONO #: 0.59 K/UL (ref 0.1–1.2)
ANION GAP SERPL CALCULATED.3IONS-SCNC: 10 MMOL/L (ref 9–17)
BASOPHILS # BLD: 1 % (ref 0–2)
BASOPHILS ABSOLUTE: 0.04 K/UL (ref 0–0.2)
BUN BLDV-MCNC: 24 MG/DL (ref 8–23)
BUN/CREAT BLD: 19 (ref 9–20)
CALCIUM SERPL-MCNC: 9.7 MG/DL (ref 8.6–10.4)
CHLORIDE BLD-SCNC: 101 MMOL/L (ref 98–107)
CO2: 26 MMOL/L (ref 20–31)
CREAT SERPL-MCNC: 1.29 MG/DL (ref 0.7–1.2)
DIFFERENTIAL TYPE: NORMAL
EOSINOPHILS RELATIVE PERCENT: 2 % (ref 1–4)
GFR AFRICAN AMERICAN: >60 ML/MIN
GFR NON-AFRICAN AMERICAN: 57 ML/MIN
GFR SERPL CREATININE-BSD FRML MDRD: ABNORMAL ML/MIN/{1.73_M2}
GFR SERPL CREATININE-BSD FRML MDRD: ABNORMAL ML/MIN/{1.73_M2}
GLUCOSE BLD-MCNC: 207 MG/DL (ref 75–110)
GLUCOSE BLD-MCNC: 255 MG/DL (ref 70–99)
GLUCOSE BLD-MCNC: 273 MG/DL (ref 75–110)
GLUCOSE BLD-MCNC: 290 MG/DL (ref 75–110)
HCT VFR BLD CALC: 45.2 % (ref 40.7–50.3)
HEMOGLOBIN: 14.4 G/DL (ref 13–17)
IMMATURE GRANULOCYTES: 0 %
LYMPHOCYTES # BLD: 31 % (ref 24–43)
MCH RBC QN AUTO: 29.8 PG (ref 25.2–33.5)
MCHC RBC AUTO-ENTMCNC: 31.9 G/DL (ref 28.4–34.8)
MCV RBC AUTO: 93.6 FL (ref 82.6–102.9)
MONOCYTES # BLD: 8 % (ref 3–12)
NRBC AUTOMATED: 0 PER 100 WBC
PDW BLD-RTO: 13 % (ref 11.8–14.4)
PLATELET # BLD: 214 K/UL (ref 138–453)
PLATELET ESTIMATE: NORMAL
PMV BLD AUTO: 8.8 FL (ref 8.1–13.5)
POTASSIUM SERPL-SCNC: 4.4 MMOL/L (ref 3.7–5.3)
RBC # BLD: 4.83 M/UL (ref 4.21–5.77)
RBC # BLD: NORMAL 10*6/UL
SARS-COV-2, NAA: NOT DETECTED
SEG NEUTROPHILS: 58 % (ref 36–65)
SEGMENTED NEUTROPHILS ABSOLUTE COUNT: 4.16 K/UL (ref 1.5–8.1)
SODIUM BLD-SCNC: 137 MMOL/L (ref 135–144)
WBC # BLD: 7.2 K/UL (ref 3.5–11.3)
WBC # BLD: NORMAL 10*3/UL

## 2020-12-03 PROCEDURE — 2580000003 HC RX 258: Performed by: ANESTHESIOLOGY

## 2020-12-03 PROCEDURE — 6360000002 HC RX W HCPCS: Performed by: NURSE ANESTHETIST, CERTIFIED REGISTERED

## 2020-12-03 PROCEDURE — 2709999900 HC NON-CHARGEABLE SUPPLY: Performed by: PODIATRIST

## 2020-12-03 PROCEDURE — 6370000000 HC RX 637 (ALT 250 FOR IP): Performed by: ANESTHESIOLOGY

## 2020-12-03 PROCEDURE — 99243 OFF/OP CNSLTJ NEW/EST LOW 30: CPT | Performed by: NURSE PRACTITIONER

## 2020-12-03 PROCEDURE — 96372 THER/PROPH/DIAG INJ SC/IM: CPT

## 2020-12-03 PROCEDURE — 3700000001 HC ADD 15 MINUTES (ANESTHESIA): Performed by: PODIATRIST

## 2020-12-03 PROCEDURE — 87070 CULTURE OTHR SPECIMN AEROBIC: CPT

## 2020-12-03 PROCEDURE — 2720000010 HC SURG SUPPLY STERILE: Performed by: PODIATRIST

## 2020-12-03 PROCEDURE — 82947 ASSAY GLUCOSE BLOOD QUANT: CPT

## 2020-12-03 PROCEDURE — 7100000000 HC PACU RECOVERY - FIRST 15 MIN: Performed by: PODIATRIST

## 2020-12-03 PROCEDURE — 85025 COMPLETE CBC W/AUTO DIFF WBC: CPT

## 2020-12-03 PROCEDURE — 6360000002 HC RX W HCPCS: Performed by: STUDENT IN AN ORGANIZED HEALTH CARE EDUCATION/TRAINING PROGRAM

## 2020-12-03 PROCEDURE — G0378 HOSPITAL OBSERVATION PER HR: HCPCS

## 2020-12-03 PROCEDURE — 36415 COLL VENOUS BLD VENIPUNCTURE: CPT

## 2020-12-03 PROCEDURE — 87075 CULTR BACTERIA EXCEPT BLOOD: CPT

## 2020-12-03 PROCEDURE — 73630 X-RAY EXAM OF FOOT: CPT

## 2020-12-03 PROCEDURE — 2500000003 HC RX 250 WO HCPCS: Performed by: NURSE ANESTHETIST, CERTIFIED REGISTERED

## 2020-12-03 PROCEDURE — 80048 BASIC METABOLIC PNL TOTAL CA: CPT

## 2020-12-03 PROCEDURE — 88304 TISSUE EXAM BY PATHOLOGIST: CPT

## 2020-12-03 PROCEDURE — 3700000000 HC ANESTHESIA ATTENDED CARE: Performed by: PODIATRIST

## 2020-12-03 PROCEDURE — 2500000003 HC RX 250 WO HCPCS: Performed by: PODIATRIST

## 2020-12-03 PROCEDURE — 7100000001 HC PACU RECOVERY - ADDTL 15 MIN: Performed by: PODIATRIST

## 2020-12-03 PROCEDURE — 87205 SMEAR GRAM STAIN: CPT

## 2020-12-03 PROCEDURE — 88311 DECALCIFY TISSUE: CPT

## 2020-12-03 PROCEDURE — 87176 TISSUE HOMOGENIZATION CULTR: CPT

## 2020-12-03 PROCEDURE — 2580000003 HC RX 258: Performed by: STUDENT IN AN ORGANIZED HEALTH CARE EDUCATION/TRAINING PROGRAM

## 2020-12-03 PROCEDURE — 3600000012 HC SURGERY LEVEL 2 ADDTL 15MIN: Performed by: PODIATRIST

## 2020-12-03 PROCEDURE — 3600000002 HC SURGERY LEVEL 2 BASE: Performed by: PODIATRIST

## 2020-12-03 PROCEDURE — 99253 IP/OBS CNSLTJ NEW/EST LOW 45: CPT | Performed by: INTERNAL MEDICINE

## 2020-12-03 PROCEDURE — 6370000000 HC RX 637 (ALT 250 FOR IP): Performed by: NURSE PRACTITIONER

## 2020-12-03 PROCEDURE — 6370000000 HC RX 637 (ALT 250 FOR IP): Performed by: STUDENT IN AN ORGANIZED HEALTH CARE EDUCATION/TRAINING PROGRAM

## 2020-12-03 RX ORDER — PROMETHAZINE HYDROCHLORIDE 12.5 MG/1
12.5 TABLET ORAL EVERY 6 HOURS PRN
Status: DISCONTINUED | OUTPATIENT
Start: 2020-12-03 | End: 2020-12-04 | Stop reason: HOSPADM

## 2020-12-03 RX ORDER — HYDROMORPHONE HCL 110MG/55ML
0.5 PATIENT CONTROLLED ANALGESIA SYRINGE INTRAVENOUS EVERY 5 MIN PRN
Status: DISCONTINUED | OUTPATIENT
Start: 2020-12-03 | End: 2020-12-03 | Stop reason: HOSPADM

## 2020-12-03 RX ORDER — ACETAMINOPHEN 650 MG/1
650 SUPPOSITORY RECTAL EVERY 6 HOURS PRN
Status: DISCONTINUED | OUTPATIENT
Start: 2020-12-03 | End: 2020-12-04 | Stop reason: HOSPADM

## 2020-12-03 RX ORDER — LANOLIN ALCOHOL/MO/W.PET/CERES
6 CREAM (GRAM) TOPICAL NIGHTLY
Status: DISCONTINUED | OUTPATIENT
Start: 2020-12-03 | End: 2020-12-04 | Stop reason: HOSPADM

## 2020-12-03 RX ORDER — OXYCODONE HYDROCHLORIDE AND ACETAMINOPHEN 5; 325 MG/1; MG/1
2 TABLET ORAL EVERY 4 HOURS PRN
Status: DISCONTINUED | OUTPATIENT
Start: 2020-12-03 | End: 2020-12-04 | Stop reason: HOSPADM

## 2020-12-03 RX ORDER — DEXTROSE MONOHYDRATE 25 G/50ML
12.5 INJECTION, SOLUTION INTRAVENOUS PRN
Status: DISCONTINUED | OUTPATIENT
Start: 2020-12-03 | End: 2020-12-04 | Stop reason: HOSPADM

## 2020-12-03 RX ORDER — SODIUM CHLORIDE, SODIUM LACTATE, POTASSIUM CHLORIDE, CALCIUM CHLORIDE 600; 310; 30; 20 MG/100ML; MG/100ML; MG/100ML; MG/100ML
INJECTION, SOLUTION INTRAVENOUS CONTINUOUS
Status: DISCONTINUED | OUTPATIENT
Start: 2020-12-04 | End: 2020-12-03

## 2020-12-03 RX ORDER — DEXTROSE MONOHYDRATE 50 MG/ML
100 INJECTION, SOLUTION INTRAVENOUS PRN
Status: DISCONTINUED | OUTPATIENT
Start: 2020-12-03 | End: 2020-12-04 | Stop reason: HOSPADM

## 2020-12-03 RX ORDER — MIDAZOLAM HYDROCHLORIDE 1 MG/ML
INJECTION INTRAMUSCULAR; INTRAVENOUS PRN
Status: DISCONTINUED | OUTPATIENT
Start: 2020-12-03 | End: 2020-12-03 | Stop reason: SDUPTHER

## 2020-12-03 RX ORDER — OXYCODONE HYDROCHLORIDE AND ACETAMINOPHEN 5; 325 MG/1; MG/1
1 TABLET ORAL EVERY 6 HOURS PRN
Qty: 28 TABLET | Refills: 0 | Status: SHIPPED | OUTPATIENT
Start: 2020-12-03 | End: 2020-12-04 | Stop reason: SDUPTHER

## 2020-12-03 RX ORDER — KETAMINE HCL IN NACL, ISO-OSM 100MG/10ML
SYRINGE (ML) INJECTION PRN
Status: DISCONTINUED | OUTPATIENT
Start: 2020-12-03 | End: 2020-12-03 | Stop reason: SDUPTHER

## 2020-12-03 RX ORDER — OXYCODONE HYDROCHLORIDE AND ACETAMINOPHEN 5; 325 MG/1; MG/1
2 TABLET ORAL EVERY 4 HOURS PRN
Status: ON HOLD | COMMUNITY
End: 2020-12-03 | Stop reason: HOSPADM

## 2020-12-03 RX ORDER — ONDANSETRON 2 MG/ML
4 INJECTION INTRAMUSCULAR; INTRAVENOUS EVERY 6 HOURS PRN
Status: DISCONTINUED | OUTPATIENT
Start: 2020-12-03 | End: 2020-12-04 | Stop reason: HOSPADM

## 2020-12-03 RX ORDER — ALLOPURINOL 300 MG/1
300 TABLET ORAL DAILY
Status: DISCONTINUED | OUTPATIENT
Start: 2020-12-03 | End: 2020-12-04 | Stop reason: HOSPADM

## 2020-12-03 RX ORDER — LIDOCAINE HYDROCHLORIDE 20 MG/ML
INJECTION, SOLUTION EPIDURAL; INFILTRATION; INTRACAUDAL; PERINEURAL PRN
Status: DISCONTINUED | OUTPATIENT
Start: 2020-12-03 | End: 2020-12-03 | Stop reason: HOSPADM

## 2020-12-03 RX ORDER — PROPOFOL 10 MG/ML
INJECTION, EMULSION INTRAVENOUS CONTINUOUS PRN
Status: DISCONTINUED | OUTPATIENT
Start: 2020-12-03 | End: 2020-12-03 | Stop reason: SDUPTHER

## 2020-12-03 RX ORDER — OXYCODONE HYDROCHLORIDE AND ACETAMINOPHEN 5; 325 MG/1; MG/1
1 TABLET ORAL EVERY 4 HOURS PRN
Status: DISCONTINUED | OUTPATIENT
Start: 2020-12-03 | End: 2020-12-04 | Stop reason: HOSPADM

## 2020-12-03 RX ORDER — SODIUM CHLORIDE 0.9 % (FLUSH) 0.9 %
10 SYRINGE (ML) INJECTION PRN
Status: DISCONTINUED | OUTPATIENT
Start: 2020-12-03 | End: 2020-12-04 | Stop reason: HOSPADM

## 2020-12-03 RX ORDER — OXYCODONE HYDROCHLORIDE AND ACETAMINOPHEN 5; 325 MG/1; MG/1
1 TABLET ORAL PRN
Status: DISCONTINUED | OUTPATIENT
Start: 2020-12-03 | End: 2020-12-03 | Stop reason: HOSPADM

## 2020-12-03 RX ORDER — SODIUM CHLORIDE 0.9 % (FLUSH) 0.9 %
10 SYRINGE (ML) INJECTION EVERY 12 HOURS SCHEDULED
Status: DISCONTINUED | OUTPATIENT
Start: 2020-12-03 | End: 2020-12-03 | Stop reason: HOSPADM

## 2020-12-03 RX ORDER — HEPARIN SODIUM 5000 [USP'U]/ML
5000 INJECTION, SOLUTION INTRAVENOUS; SUBCUTANEOUS EVERY 8 HOURS SCHEDULED
Status: DISCONTINUED | OUTPATIENT
Start: 2020-12-03 | End: 2020-12-04 | Stop reason: HOSPADM

## 2020-12-03 RX ORDER — SODIUM CHLORIDE 9 MG/ML
INJECTION, SOLUTION INTRAVENOUS CONTINUOUS
Status: DISCONTINUED | OUTPATIENT
Start: 2020-12-04 | End: 2020-12-03

## 2020-12-03 RX ORDER — PROMETHAZINE HYDROCHLORIDE 25 MG/ML
6.25 INJECTION, SOLUTION INTRAMUSCULAR; INTRAVENOUS
Status: DISCONTINUED | OUTPATIENT
Start: 2020-12-03 | End: 2020-12-03 | Stop reason: HOSPADM

## 2020-12-03 RX ORDER — LORAZEPAM 0.5 MG/1
0.5 TABLET ORAL NIGHTLY
Status: DISCONTINUED | OUTPATIENT
Start: 2020-12-03 | End: 2020-12-04 | Stop reason: HOSPADM

## 2020-12-03 RX ORDER — SODIUM CHLORIDE 0.9 % (FLUSH) 0.9 %
10 SYRINGE (ML) INJECTION EVERY 12 HOURS SCHEDULED
Status: DISCONTINUED | OUTPATIENT
Start: 2020-12-03 | End: 2020-12-04 | Stop reason: HOSPADM

## 2020-12-03 RX ORDER — FENTANYL CITRATE 50 UG/ML
25 INJECTION, SOLUTION INTRAMUSCULAR; INTRAVENOUS EVERY 5 MIN PRN
Status: DISCONTINUED | OUTPATIENT
Start: 2020-12-03 | End: 2020-12-03 | Stop reason: HOSPADM

## 2020-12-03 RX ORDER — POLYETHYLENE GLYCOL 3350 17 G/17G
17 POWDER, FOR SOLUTION ORAL DAILY PRN
Status: DISCONTINUED | OUTPATIENT
Start: 2020-12-03 | End: 2020-12-04 | Stop reason: HOSPADM

## 2020-12-03 RX ORDER — HYDRALAZINE HYDROCHLORIDE 20 MG/ML
5 INJECTION INTRAMUSCULAR; INTRAVENOUS EVERY 10 MIN PRN
Status: DISCONTINUED | OUTPATIENT
Start: 2020-12-03 | End: 2020-12-03 | Stop reason: HOSPADM

## 2020-12-03 RX ORDER — SODIUM CHLORIDE 0.9 % (FLUSH) 0.9 %
10 SYRINGE (ML) INJECTION PRN
Status: DISCONTINUED | OUTPATIENT
Start: 2020-12-03 | End: 2020-12-03 | Stop reason: HOSPADM

## 2020-12-03 RX ORDER — ACETAMINOPHEN 325 MG/1
650 TABLET ORAL EVERY 6 HOURS PRN
Status: DISCONTINUED | OUTPATIENT
Start: 2020-12-03 | End: 2020-12-04 | Stop reason: HOSPADM

## 2020-12-03 RX ORDER — ONDANSETRON 2 MG/ML
4 INJECTION INTRAMUSCULAR; INTRAVENOUS
Status: DISCONTINUED | OUTPATIENT
Start: 2020-12-03 | End: 2020-12-03 | Stop reason: HOSPADM

## 2020-12-03 RX ORDER — LABETALOL HYDROCHLORIDE 5 MG/ML
5 INJECTION, SOLUTION INTRAVENOUS EVERY 10 MIN PRN
Status: DISCONTINUED | OUTPATIENT
Start: 2020-12-03 | End: 2020-12-03 | Stop reason: HOSPADM

## 2020-12-03 RX ORDER — FLUTICASONE PROPIONATE 50 MCG
1 SPRAY, SUSPENSION (ML) NASAL DAILY PRN
Status: DISCONTINUED | OUTPATIENT
Start: 2020-12-03 | End: 2020-12-04 | Stop reason: HOSPADM

## 2020-12-03 RX ORDER — NICOTINE POLACRILEX 4 MG
15 LOZENGE BUCCAL PRN
Status: DISCONTINUED | OUTPATIENT
Start: 2020-12-03 | End: 2020-12-04 | Stop reason: HOSPADM

## 2020-12-03 RX ORDER — LIDOCAINE HYDROCHLORIDE 10 MG/ML
1 INJECTION, SOLUTION EPIDURAL; INFILTRATION; INTRACAUDAL; PERINEURAL
Status: DISCONTINUED | OUTPATIENT
Start: 2020-12-03 | End: 2020-12-03 | Stop reason: HOSPADM

## 2020-12-03 RX ORDER — GABAPENTIN 300 MG/1
300 CAPSULE ORAL 2 TIMES DAILY PRN
Status: DISCONTINUED | OUTPATIENT
Start: 2020-12-03 | End: 2020-12-04 | Stop reason: HOSPADM

## 2020-12-03 RX ORDER — OXYCODONE HYDROCHLORIDE AND ACETAMINOPHEN 5; 325 MG/1; MG/1
2 TABLET ORAL PRN
Status: DISCONTINUED | OUTPATIENT
Start: 2020-12-03 | End: 2020-12-03 | Stop reason: HOSPADM

## 2020-12-03 RX ADMIN — Medication 10 MG: at 13:52

## 2020-12-03 RX ADMIN — MIDAZOLAM 2 MG: 1 INJECTION INTRAMUSCULAR; INTRAVENOUS at 12:53

## 2020-12-03 RX ADMIN — Medication 20 MG: at 13:01

## 2020-12-03 RX ADMIN — VANCOMYCIN HYDROCHLORIDE 2 G: 1 INJECTION, POWDER, LYOPHILIZED, FOR SOLUTION INTRAVENOUS at 13:30

## 2020-12-03 RX ADMIN — HEPARIN SODIUM 5000 UNITS: 5000 INJECTION INTRAVENOUS; SUBCUTANEOUS at 22:20

## 2020-12-03 RX ADMIN — Medication 20 MG: at 13:16

## 2020-12-03 RX ADMIN — MAGNESIUM GLUCONATE 500 MG ORAL TABLET 200 MG: 500 TABLET ORAL at 17:10

## 2020-12-03 RX ADMIN — INSULIN HUMAN 10 UNITS: 100 INJECTION, SOLUTION PARENTERAL at 11:53

## 2020-12-03 RX ADMIN — PROPOFOL 100 MCG/KG/MIN: 10 INJECTION, EMULSION INTRAVENOUS at 12:57

## 2020-12-03 RX ADMIN — OXYCODONE AND ACETAMINOPHEN 2 TABLET: 5; 325 TABLET ORAL at 17:11

## 2020-12-03 RX ADMIN — MELATONIN TAB 3 MG 6 MG: 3 TAB at 22:21

## 2020-12-03 RX ADMIN — ALLOPURINOL 300 MG: 300 TABLET ORAL at 18:43

## 2020-12-03 RX ADMIN — PROPOFOL: 10 INJECTION, EMULSION INTRAVENOUS at 14:21

## 2020-12-03 RX ADMIN — SODIUM CHLORIDE, PRESERVATIVE FREE 10 ML: 5 INJECTION INTRAVENOUS at 11:34

## 2020-12-03 RX ADMIN — HEPARIN SODIUM 5000 UNITS: 5000 INJECTION INTRAVENOUS; SUBCUTANEOUS at 17:10

## 2020-12-03 RX ADMIN — INSULIN LISPRO 6 UNITS: 100 INJECTION, SOLUTION INTRAVENOUS; SUBCUTANEOUS at 22:20

## 2020-12-03 RX ADMIN — INSULIN LISPRO 2 UNITS: 100 INJECTION, SOLUTION INTRAVENOUS; SUBCUTANEOUS at 17:10

## 2020-12-03 RX ADMIN — OXYCODONE AND ACETAMINOPHEN 2 TABLET: 5; 325 TABLET ORAL at 22:21

## 2020-12-03 RX ADMIN — INSULIN HUMAN 15 UNITS: 100 INJECTION, SOLUTION PARENTERAL at 13:08

## 2020-12-03 RX ADMIN — SODIUM CHLORIDE, POTASSIUM CHLORIDE, SODIUM LACTATE AND CALCIUM CHLORIDE: 600; 310; 30; 20 INJECTION, SOLUTION INTRAVENOUS at 11:34

## 2020-12-03 RX ADMIN — LORAZEPAM 0.5 MG: 0.5 TABLET ORAL at 22:20

## 2020-12-03 RX ADMIN — AMPICILLIN SODIUM AND SULBACTAM SODIUM 1.5 G: 1; .5 INJECTION, POWDER, FOR SOLUTION INTRAMUSCULAR; INTRAVENOUS at 18:42

## 2020-12-03 ASSESSMENT — PAIN DESCRIPTION - PROGRESSION
CLINICAL_PROGRESSION: GRADUALLY WORSENING
CLINICAL_PROGRESSION: GRADUALLY WORSENING

## 2020-12-03 ASSESSMENT — PULMONARY FUNCTION TESTS
PIF_VALUE: 1
PIF_VALUE: 0
PIF_VALUE: 1
PIF_VALUE: 0
PIF_VALUE: 1
PIF_VALUE: 0
PIF_VALUE: 1
PIF_VALUE: 0
PIF_VALUE: 1
PIF_VALUE: 0
PIF_VALUE: 1

## 2020-12-03 ASSESSMENT — ENCOUNTER SYMPTOMS
ABDOMINAL PAIN: 0
SHORTNESS OF BREATH: 0
VOMITING: 0
CONSTIPATION: 0
DIARRHEA: 0
NAUSEA: 0
COLOR CHANGE: 0

## 2020-12-03 ASSESSMENT — PAIN DESCRIPTION - LOCATION
LOCATION: FOOT
LOCATION: FOOT

## 2020-12-03 ASSESSMENT — PAIN DESCRIPTION - FREQUENCY
FREQUENCY: CONTINUOUS
FREQUENCY: INTERMITTENT

## 2020-12-03 ASSESSMENT — PAIN DESCRIPTION - DESCRIPTORS
DESCRIPTORS: ACHING
DESCRIPTORS: ACHING

## 2020-12-03 ASSESSMENT — PAIN SCALES - GENERAL
PAINLEVEL_OUTOF10: 0
PAINLEVEL_OUTOF10: 7
PAINLEVEL_OUTOF10: 7
PAINLEVEL_OUTOF10: 5
PAINLEVEL_OUTOF10: 0

## 2020-12-03 ASSESSMENT — PAIN DESCRIPTION - PAIN TYPE
TYPE: SURGICAL PAIN
TYPE: SURGICAL PAIN

## 2020-12-03 ASSESSMENT — PAIN DESCRIPTION - ORIENTATION
ORIENTATION: LEFT
ORIENTATION: LEFT

## 2020-12-03 ASSESSMENT — PAIN DESCRIPTION - ONSET
ONSET: GRADUAL
ONSET: ON-GOING

## 2020-12-03 ASSESSMENT — PAIN - FUNCTIONAL ASSESSMENT: PAIN_FUNCTIONAL_ASSESSMENT: PREVENTS OR INTERFERES SOME ACTIVE ACTIVITIES AND ADLS

## 2020-12-03 NOTE — BRIEF OP NOTE
PODIATRY BRIEF OP NOTE    PATIENT NAME: Otis Garcia DATE: 1960  -  63 y.o. male  MRN: [id number]  DATE: 12/3/2020  BILLING #: [id number]    Surgeon(s): Roger Medina DPM     ASSISTANTS: Zena Tang DPM PGY2, Jaylen Kelly, MS4    PRE-OP DIAGNOSIS:   1. Osteomyelitis of first metatarsal, left foot  2. Chronic nonhealing wound subfirst metatarsal, left foot  3. Application of drain, left foot    POST-OP DIAGNOSIS: Same as above. PROCEDURE:   1. Complete resection of first metatarsal, left foot  2. Excision  chronic nonhealing wound sub-first metatarsal, left foot via ellipse and suturing    ANESTHESIA: MAC with local (10 cc of 1:1 mixture 1% lidocaine plain and 0.5% marcaine plain)    HEMOSTASIS: Pneumatic ankle left tourniquet @250 mmHg for 79 minutes. ESTIMATED BLOOD LOSS: Less than 30 cc. MATERIALS: 4-0 Vicryl, 3-0 Vicryl, 4-0 PDS, 2-0 nylon, 3-0 nylon, drain  * No implants in log *    INJECTABLES: None    SPECIMEN: First metatarsal bone for culture and path  ID Type Source Tests Collected by Time Destination   A : LEFT FOOT 1ST MET GROSS MICRO CULTURES, PATH TO FOLLOW Tissue Toe SURGICAL PATHOLOGY, CULTURE, ANAEROBIC AND AEROBIC Roger Medina DPM 12/3/2020 7271        COMPLICATIONS: None    FINDINGS: All necrotic and fibrotic infected tissue debrided until bleeding tissue noted. No purulent drainage noted. Complete resection of first metatarsal bone. Necrosis noted at the most distal aspect of first metatarsal bone    The patient was counseled at length about the risks of jossue Covid-19 during their perioperative period and any recovery window from their procedure. The patient was made aware that jossue Covid-19  may worsen their prognosis for recovering from their procedure  and lend to a higher morbidity and/or mortality risk. All material risks, benefits, and reasonable alternatives including postponing the procedure were discussed.  The patient does wish to proceed with the procedure at this time.     Irene Matta DPM   Podiatric Medicine & Surgery   12/3/2020 at 3:15 PM

## 2020-12-03 NOTE — OP NOTE
PATIENT NAME: Robby Chao  YOB: 1960  -  63 y.o. male  MRN: [id number]  DATE: 12/3/2020  BILLING #: [id number]    Surgeon(s): Gume Massey DPM     ASSISTANTS: Layla Oh DPM PGY2, Aureliano Irwin, MS4    PRE-OP DIAGNOSIS:   1. Osteomyelitis of first metatarsal, left foot  2. Chronic nonhealing wound subfirst metatarsal, left foot  3. Application of drain, left foot    POST-OP DIAGNOSIS: Same as above. PROCEDURE:   1. Complete resection of first metatarsal, left foot  2. Excision  chronic nonhealing wound sub-first metatarsal, left foot via ellipse and suturing    ANESTHESIA: MAC with local (10 cc of 1:1 mixture 1% lidocaine plain and 0.5% marcaine plain)    HEMOSTASIS: Pneumatic ankle left tourniquet @250 mmHg for 79 minutes. ESTIMATED BLOOD LOSS: Less than 30 cc. MATERIALS: 4-0 Vicryl, 3-0 Vicryl, 4-0 PDS, 2-0 nylon, 3-0 nylon, drain  * No implants in log *    INJECTABLES: None    SPECIMEN: First metatarsal bone for culture and path  ID Type Source Tests Collected by Time Destination   A : LEFT FOOT 1ST MET GROSS MICRO CULTURES, PATH TO FOLLOW Tissue Toe SURGICAL PATHOLOGY, CULTURE, ANAEROBIC AND AEROBIC Gume Massey DPM 12/3/2020 6493        COMPLICATIONS: None    FINDINGS: All necrotic and fibrotic infected tissue debrided until bleeding tissue noted. No purulent drainage noted. Complete resection of first metatarsal bone. Necrosis noted at the most distal aspect of first metatarsal bone    The patient was counseled at length about the risks of jossue Covid-19 during their perioperative period and any recovery window from their procedure. The patient was made aware that jossue Covid-19  may worsen their prognosis for recovering from their procedure  and lend to a higher morbidity and/or mortality risk. All material risks, benefits, and reasonable alternatives including postponing the procedure were discussed.  The patient does wish to proceed with the procedure at this time.    INDICATIONS FOR PROCEDURE: Carlos Amaya is a 63 y.o. male well known to Dr. Yasmine Huerta with a CC of chronic nonhealing wound and . Patient has failed conservative treatment and surgical treatment has been recommended to which the patient is amenable. In pre-op all risks and benefits  of the procedure were discussed at length prior to the patient signing the consent form . Consent is signed, witnessed, and placed in  patient chart. The left3 foot was marked, antibiotics given (Vancomycin), labs and images reviewed, NPO status confirmed. No guarantees were given or implied. PROCEDURE IN DETAIL: The patient was brought to the operating room and placed on the operating table in the supine position with a safety strap across the lap. A well-padded pneumatic ankle tourniquet was applied to the left ankle. After adequate sedation was given by the anesthesia team, a local block of 10 cc of 2% lidocaine plain was injected to the left ankle preoperatively. The surgical site was then scrubbed, prepped, and draped in the usual aseptic manner. A timeout was performed confirming the patient's identity, correct site, correct procedure, allergies, and preoperative antibiotics. An Esmarch bandage was then used to exsanguinate left foot and the tourniquet was inflated to 250 mmHg. Attention was then directed to the plantar sub 1st metatarsal head. Wound probed to the 1st metatarsal. Dorsally a prior healed incision was noted. A dorsal linear incision was created using a #15 blade over the 1st metatarsal. Incision was deepened. Skin a subcutaneous tissue was dissected with sharp and blunt dissection. It was noted that the patient had in creased scar tissue and adhesions in the surgical site. All bleeders were ligated with Bovie cautery and some veins were tied off with 4-0 and 3-0 vicryl. Care was taken to retract all vital neurovascular bundles.  At this time, attention was drawn to the head of the first metatarsal

## 2020-12-03 NOTE — ANESTHESIA POSTPROCEDURE EVALUATION
Department of Anesthesiology  Postprocedure Note    Patient: Minoo Andujar  MRN: [id number]  [place]: 1960  Date of evaluation: 12/3/2020  Time:  4:56 PM     Procedure Summary     Date:  12/03/20 Room / Location:  [address] - INPATIENT    Anesthesia Start:  4913 Anesthesia Stop:  8228    Procedure:  RESECTION LEFT FIRST MET PLANTAR WOUND DEBRIDEMENT (Left Foot) Diagnosis:  (DX OSTEOMYELITIS FIRST LEFT FIRST MET)    Surgeon:  Mp Petty DPM Responsible Provider:  Camila Burgos DO    Anesthesia Type:  MAC, general ASA Status:  3          Anesthesia Type: No value filed. Margarita Phase I: Margarita Score: 10    Margarita Phase II:      Last vitals: Reviewed and per EMR flowsheets.        Anesthesia Post Evaluation    Patient location during evaluation: PACU  Patient participation: complete - patient participated  Level of consciousness: awake and alert  Airway patency: patent  Nausea & Vomiting: no nausea and no vomiting  Complications: no  Cardiovascular status: hemodynamically stable  Respiratory status: acceptable  Hydration status: stable

## 2020-12-03 NOTE — H&P
Admission History and Physical  Podiatric Medicine and Surgery     Subjective     Chief Complaint: Status post complete first metatarsal resection, left foot (DOS: 12/3/2020)    HPI:  Karli Curran is a 63 y.o. male seen at [facility] for surgical resection of first metatarsal left foot with application of drain. Patient is well-known to Pedro Franz in the podiatry service. Patient has a history of chronic nonhealing diabetic wound on the bottom of his left foot. Patient was recently admitted for similar issue. At his last admission, patient underwent a left hallux amputation with bone biopsy of the first metatarsal bone. Repeat MRI showed osteomyelitis within the first metatarsal.  Patient currently is receiving Unasyn via PICC line. Patient followed up with infectious disease earlier this week. Patient is a type II diabetic with neuropathy, a recent A1c of 11.6 as of 10/28/2020. Denies any other pedal complaints. PCP is Carlos Fields MD    ROS:   Review of Systems   Constitutional: Negative for chills and fever. Respiratory: Negative for shortness of breath. Cardiovascular: Negative for chest pain and leg swelling. Gastrointestinal: Negative for abdominal pain, constipation, diarrhea, nausea and vomiting. Musculoskeletal: Negative for arthralgias, gait problem, joint swelling and myalgias. Skin: Negative for color change and wound. Neurological: Positive for numbness. Negative for weakness. Past Medical History   has a past medical history of Chronic kidney disease, Diabetes mellitus ([address] Diabetic neuropathy ([facility] History of kidney stones, and Sleep apnea. Past Surgical History   has a past surgical history that includes Lithotripsy (2013); Appendectomy; Nasal septum surgery; Foot Debridement (Left, 01/18/2016); skin split graft (Left, 1/2016); Foot surgery (Left, 02/19/2019); Foot Debridement (Left, 2/19/2019); Foot surgery (Left, 2/22/2019);  Foot surgery transportation 19   Geno Nickel, DPM   gabapentin (NEURONTIN) 300 MG capsule Take 300 mg by mouth 2 times daily as needed (nerve pain). Historical Provider, MD    Scheduled Meds:   sodium chloride flush  10 mL Intravenous 2 times per day     Continuous Infusions:   [START ON 2020] lactated ringers Stopped (20 1330)     PRN Meds:.sodium chloride flush, lidocaine PF, lidocaine PF, fentanNYL, HYDROmorphone, fentanNYL, HYDROmorphone, oxyCODONE-acetaminophen **OR** oxyCODONE-acetaminophen, ondansetron, promethazine, labetalol, hydrALAZINE    Allergies  has No Known Allergies. Family History  family history includes Heart Disease in his father and mother. Social History   reports that he has never smoked. He has never used smokeless tobacco.   reports current alcohol use. reports no history of drug use. Objective     Vitals:  Patient Vitals for the past 8 hrs:   BP Temp Temp src Pulse Resp SpO2 Height Weight   20 1515 (!) 142/73 -- -- 66 10 96 % -- --   20 1500 126/67 -- -- 66 19 97 % -- --   20 1447 123/76 97.5 °F (36.4 °C) Temporal 67 19 98 % -- --   20 1056 (!) 156/70 96 °F (35.6 °C) Temporal 73 18 96 % 6' 4\" (1.93 m) (!) 356 lb (161.5 kg)     Average, Min, and Max for last 24 hours Vitals:  TEMPERATURE:  Temp  Av.8 °F (36 °C)  Min: 96 °F (35.6 °C)  Max: 97.5 °F (36.4 °C)    RESPIRATIONS RANGE: Resp  Avg: 15.3  Min: 0  Max: 30    PULSE RANGE: Pulse  Av  Min: 66  Max: 73    BLOOD PRESSURE RANGE:  Systolic (74PWL), HDH:356 , Min:123 , IHT:166   ; Diastolic (32TVX), WMP:31, Min:64, Max:86      PULSE OXIMETRY RANGE: SpO2  Av.4 %  Min: 88 %  Max: 98 %  I&O:  I/O last 3 completed shifts: In: 900 [I.V.:400; IV Piggyback:500]  Out: 10 [Blood:10]    CBC:  No results for input(s): WBC, HGB, HCT, PLT, CRP in the last 72 hours.     Invalid input(s):  ESR     BMP:  No results for input(s): NA, K, CL, CO2, BUN, CREATININE, GLUCOSE, CALCIUM in the last 72 ordered  · Medical management per internal medicine. Appreciate recs  · Antibiotics: Unasyn, ID consulted. Appreciate recs  · Discussed with patient that we recommend he be admitted overnight to monitor drain output. Based on amount of output, drain will be pulled tomorrow morning. Patient confirmed understanding and is amenable to plan. · Continue to have left foot elevated  · Dressings intact to the left foot:  Adaptic, DSD, Ace  · Strict nonweightbearing to the left foot in a surgical shoe  · Discussed with Dr. Jenniffer Rubinstein    DVT ppx: Heparin  Diet: carb control   Activity: Nonweightbearing to Left lower extremity  Pain Control: panel ordered      Chang Belle DPM   Podiatric Medicine & Surgery   12/3/2020 at 3:33 PM

## 2020-12-03 NOTE — ANESTHESIA PRE PROCEDURE
Department of Anesthesiology  Preprocedure Note       Name:  Roger Estrada   Age:  63 y.o.  :  1960                                          MRN:  [id number]         Date:  12/3/2020      Surgeon: Merna Mcelroy): Roger Medina DPM    Procedure: RESECTION LEFT FIRST MET - PULSAVAC (Left Foot)    Medications prior to admission:   Prior to Admission medications    Medication Sig Start Date End Date Taking? Authorizing Provider   oxyCODONE-acetaminophen (PERCOCET) 5-325 MG per tablet Take 2 tablets by mouth every 4 hours as needed for Pain. Historical Provider, MD   insulin glargine (LANTUS) 100 UNIT/ML injection vial Inject 40 Units into the skin 2 times daily 10/28/20   Deloris Her, APRN - NP   metFORMIN (GLUCOPHAGE) 500 MG tablet Take 1 tablet by mouth 2 times daily (with meals) 10/28/20   Deloris Her, APRN - NP   diclofenac (VOLTAREN) 75 MG EC tablet Take 75 mg by mouth 2 times daily    Historical Provider, MD   Liniments (BLUE-EMU SUPER STRENGTH) CREA Apply topically as needed (to hands)    Historical Provider, MD   diclofenac sodium (VOLTAREN) 1 % GEL Apply 2 g topically 4 times daily as needed for Pain    Historical Provider, MD   Handicap Placard MISC by Does not apply route Dispense one temporary handicap placard for six months for ease of transportation 19   Chris Matthews DPM   melatonin 5 MG TABS tablet Take 5-10 mg by mouth nightly     Historical Provider, MD   fluticasone (FLONASE) 50 MCG/ACT nasal spray 1-2 sprays by Each Nostril route daily as needed for Allergies     Historical Provider, MD   Multiple Vitamins-Minerals (THERAPEUTIC MULTIVITAMIN-MINERALS) tablet Take 1 tablet by mouth daily    Historical Provider, MD   magnesium (MAGNESIUM-OXIDE) 250 MG TABS tablet Take 250 mg by mouth daily    Historical Provider, MD   gabapentin (NEURONTIN) 300 MG capsule Take 300 mg by mouth 2 times daily as needed (nerve pain).      Historical Provider, MD   allopurinol (ZYLOPRIM) 300 MG tablet Take 300 mg by mouth daily    Historical Provider, MD   zaleplon (SONATA) 10 MG capsule Take 10 mg by mouth nightly    Historical Provider, MD       Current medications:    No current facility-administered medications for this visit. No current outpatient medications on file.      Facility-Administered Medications Ordered in Other Visits   Medication Dose Route Frequency Provider Last Rate Last Dose    [START ON 12/4/2020] lactated ringers infusion   Intravenous Continuous Fauzia Pierre MD   Stopped at 12/03/20 1330    sodium chloride flush 0.9 % injection 10 mL  10 mL Intravenous 2 times per day Fauzia Pierre MD   10 mL at 12/03/20 1134    sodium chloride flush 0.9 % injection 10 mL  10 mL Intravenous PRN Fauzia Pierre MD        lidocaine PF 1 % injection 1 mL  1 mL Intradermal Once PRN Fauzia Pierre MD        ketamine (KETALAR) injection    PRN Jennifer Crest, APRN - CRNA   20 mg at 12/03/20 1316    midazolam (VERSED) injection    PRN Jennifer Crest, APRN - CRNA   2 mg at 12/03/20 1253    propofol injection    Continuous PRN Jennifer Crest, APRN - CRNA 48.5 mL/hr at 12/03/20 1306 50 mcg/kg/min at 12/03/20 1306    lidocaine PF 2 % injection    PRN Rob Jacobs DPM   10 mL at 12/03/20 1336       Allergies:  No Known Allergies    Problem List:    Patient Active Problem List   Diagnosis Code    Diabetes mellitus type 2 in obese ([facility]) E11.69, E66.9    Chronic ulcer of left foot with fat layer exposed ([facility] [address] L97.522    Type 2 diabetes mellitus with hyperglycemia, with long-term current use of insulin ([facility]) E11.65, Z79.4    Ulcer of toe of left foot ([facility] [address] L97.529    Diabetic autonomic neuropathy associated with type 2 diabetes mellitus ([address] E11.43    Type 2 diabetes mellitus with diabetic peripheral angiopathy without gangrene ([address] E11.51    Diabetic foot infection ([facility] E11.628, L08.9    Obstructive sleep apnea G47.33    Class 3 severe obesity due to excess calories with serious comorbidity and body mass index (BMI) of 45.0 to 49.9 in adult ([facility]) E66.01, Z68.42    High anion gap metabolic acidosis P75.7    CKD (chronic kidney disease) stage 3, GFR 30-59 ml/min N18.30    Diabetes mellitus due to underlying condition with diabetic neuropathy, without long-term current use of insulin ([facility]) E08.40    Abscess of foot L02.619    Bacterial infection due to Morganella morganii A49.8    Non-healing ulcer of foot, left, with unspecified severity ([address] L97.529       Past Medical History:        Diagnosis Date    Chronic kidney disease     Diabetes mellitus ([address]     Diabetic neuropathy ([facility] [address]     History of kidney stones     Sleep apnea     uses Bi-Pap nightly       Past Surgical History:        Procedure Laterality Date    APPENDECTOMY      FOOT DEBRIDEMENT Left 01/18/2016    FOOT DEBRIDEMENT Left 2/19/2019    LEFT FOOT   INCISION AND DRAINAGE WITH WOUND VAC APPLICATION performed by Rebekah Garay DPM at [facility] Left 3/12/2019    LEFT FOOT WOUND DEBRIDEMENT WITH Lilia Ales performed by Rebekah Garay DPM at [facility] Left 94/05/0787    Application of 44 units of Apligraf biologic graft, left foot    FOOT SURGERY Left 02/19/2019    LEFT FOOT   INCISION AND DRAINAGE WITH WOUND VAC APPLICATION (Left )    FOOT SURGERY Left 2/22/2019    DELAYED PRIMARY CLOSURE LEFT FOOT      PULSEVAC performed by Rebekah Garay DPM at [address] [place] [address] Left 03/12/2019    wound debridement    IR INS PICC VAD W SQ PORT GREATER THAN 5  10/26/2020    IR INS PICC VAD W SQ PORT GREATER THAN 5 10/26/2020 MD RUCHI Randle SPECIAL PROCEDURES    LITHOTRIPSY  2013    NASAL SEPTUM SURGERY      OTHER SURGICAL HISTORY Left 04/03/2019    foot debridement and eppifix application    SKIN GRAFT Left 4/3/2019    THERASKIN AND EPIFIX APPLICATION  AND  LEFT FOOT WOUND DEBRIDEMENT AND WOUND PREP performed by Rebekah Garay DPM at [address] Left 4/18/2019

## 2020-12-03 NOTE — CONSULTS
[facility]  Initial Consult Note  Date: 12/3/2020    Hospital day :0     Impression:   1. Acute osteomyelitis of the first metatarsal head, neck, and shaft. 2. Known chronic subfirst metatarsal large plantar ulceration with associated abscess  3. Chronic kidney disease    Recommendations   · Continue intravenous antimicrobial therapy with Unasyn given that the patient previously had Enterococcus faecalis isolated. · His not clear this would be the same pathogen but for now I would recommend him being discharged on IV antimicrobial therapy with Unasyn. · I will follow his culture data early next week and make adjustments of the antibiotics as needed. · The patient did have the entire first metatarsal bone resected and therefore he should only need a 2-week course of antibiotics given that all the infected bone has been removed    Chief complaint/reason for consultation:   Acute osteomyelitis of the first metatarsal head, neck, and shaft    History of Present Illness:   Sherri Del Rio is a 63y.o.-year-old male who was initially admitted on 12/3/2020. Hector Krishnamurthy is known to me from prior hospital stay and has a history of diabetes mellitus type 2 with associated neuropathy, morbid obesity, obstructive sleep apnea on CPAP, chronic kidney disease, has a left foot plantar ulceration and this closed osteomyelitis for which he underwent great toe amputation on 10/21/2020. Cultures grew out Enterococcus faecalis and the patient completed a 6-week course of IV antibiotic therapy with Unasyn. The patient had a persistent subfirst metatarsal wound at the area where the previously had the ulceration and this has continued to drain. Work-up has shown osteomyelitis in the first metatarsal and the patient was admitted today and underwent complete resection of the first metatarsal of the left foot, wound debridement of the chronic nonhealing wound subfirst metatarsal in the left foot.   The patient had some necrotic and fibrotic tissue debrided and there was necrosis at the most distal aspect of the first metatarsal bone. I was asked to evaluate and help with antibiotic choice. I have personally reviewed the past medical history, past surgical history, medications, social history, and family history, and I have updated the database accordingly.   Past Medical History:     Past Medical History:   Diagnosis Date    Chronic kidney disease     [facility] ([facility]     Diabetic neuropathy ([facility]     History of kidney stones     Sleep apnea     uses Bi-Pap nightly     Past Surgical  History:     Past Surgical History:   Procedure Laterality Date    APPENDECTOMY      FOOT DEBRIDEMENT Left 01/18/2016    FOOT DEBRIDEMENT Left 2/19/2019    LEFT FOOT   INCISION AND DRAINAGE WITH WOUND VAC APPLICATION performed by Jonh Corbett DPM at [facility] Left 3/12/2019    LEFT FOOT WOUND DEBRIDEMENT WITH Franne Lek performed by Jonh Corbett DPM at [address] [place] [address] Left 88/78/7676    Application of 44 units of Apligraf biologic graft, left foot    FOOT SURGERY Left 02/19/2019    LEFT FOOT   INCISION AND DRAINAGE WITH WOUND VAC APPLICATION (Left )    FOOT SURGERY Left 2/22/2019    DELAYED PRIMARY CLOSURE LEFT FOOT      PULSEVAC performed by Jonh Corbett DPM at [address] [place] [address] Rd Left 03/12/2019    wound debridement    IR INS PICC VAD W SQ PORT GREATER THAN 5  10/26/2020    IR INS PICC VAD W SQ PORT GREATER THAN 5 10/26/2020 MD RUCHI Grider SPECIAL PROCEDURES    LITHOTRIPSY  2013    NASAL SEPTUM SURGERY      OTHER SURGICAL HISTORY Left 04/03/2019    foot debridement and eppifix application    SKIN GRAFT Left 4/3/2019    THERASKIN AND EPIFIX APPLICATION  AND  LEFT FOOT WOUND DEBRIDEMENT AND WOUND PREP performed by oJnh Corbett DPM at [address] [place] [address] Left 4/18/2019    LEFT FOOT APPLICATION APPLIGRAFT performed by Jonh Corbett DPM at [address] [place] [address] Left 1/8/3589    GRAFT APPLICATION LEFT FOOT - WCB performed by Laurie Anna DPM at [address] [place] [address] Road Left 1/2016    foot    TOE AMPUTATION Left 10/21/2020    LEFT PARTIAL FIRST RAY  AMPUTATION performed by Justino Higginbotham DPM at [facility] OR     Medications:      allopurinol  300 mg Oral Daily    magnesium oxide  200 mg Oral Daily    melatonin  6 mg Oral Nightly    LORazepam  0.5 mg Oral Nightly    sodium chloride flush  10 mL Intravenous 2 times per day    heparin (porcine)  5,000 Units Subcutaneous 3 times per day    insulin lispro  0-6 Units Subcutaneous TID WC    insulin lispro  0-3 Units Subcutaneous Nightly    ampicillin-sulbactam  1.5 g Intravenous Q8H     Social History:     Social History     Socioeconomic History    Marital status: Single     Spouse name: Not on file    Number of children: Not on file    Years of education: Not on file    Highest education level: Not on file   Occupational History    Not on file   Social Needs    Financial resource strain: Not on file    Food insecurity     Worry: Not on file     Inability: Not on file    Transportation needs     Medical: Not on file     Non-medical: Not on file   Tobacco Use    Smoking status: Never Smoker    Smokeless tobacco: Never Used   Substance and Sexual Activity    Alcohol use:  Yes     Alcohol/week: 0.0 standard drinks     Comment: occasional    Drug use: No    Sexual activity: Not on file   Lifestyle    Physical activity     Days per week: Not on file     Minutes per session: Not on file    Stress: Not on file   Relationships    Social connections     Talks on phone: Not on file     Gets together: Not on file     Attends [employer] service: Not on file     Active member of club or organization: Not on file     Attends meetings of clubs or organizations: Not on file     Relationship status: Not on file    Intimate partner violence     Fear of current or ex partner: Not on file     Emotionally abused: Not on file Physically abused: Not on file     Forced sexual activity: Not on file   Other Topics Concern    Not on file   Social History Narrative    Not on file     Family History:     Family History   Problem Relation Age of Onset    Heart Disease Mother     Heart Disease Father       Allergies:   Patient has no known allergies. Review of Systems:   General: No fevers or chills. Eyes: No double vision or blurry vision. ENT: No sore throat or runny nose. Cardiovascular: No chest pain or palpitations. Lung: No shortness of breath or cough. Abdomen: No nausea, vomiting, diarrhea, or abdominal pain. Genitourinary: No increased urinary frequency, or dysuria. Musculoskeletal: No muscle aches or pains. Hematologic: No bleeding or bruising. Neurologic: No headache, weakness, numbness, or tingling. Physical Examination :   /64   Pulse 64   Temp 97.8 °F (36.6 °C) (Oral)   Resp 18   Ht 6' 4\" (1.93 m)   Wt (!) 356 lb (161.5 kg)   SpO2 96%   BMI 43.33 kg/m²     Temperature Range: Temp: 97.8 °F (36.6 °C) Temp  Av.1 °F (36.2 °C)  Min: 96 °F (35.6 °C)  Max: 97.8 °F (36.6 °C)  General Appearance: Awake, alert, and in no apparent distress  Head: Normocephalic, without obvious abnormality, atraumatic  Eyes: Pupils equal, round, reactive, to light and accommodation; extraocular movements intact; sclera anicteric; conjunctivae pink  ENT: Oropharynx clear, without erythema, exudate, or thrush. Neck: Supple, without lymphadenopathy. Pulmonary/Chest: Clear to auscultation, without wheezes, rales, or rhonchi  Cardiovascular: Regular rate and rhythm without murmurs, rubs, or gallops. Abdomen: Soft, nontender, nondistended. Extremities: The left foot is in a surgical dressing and there is a drain in place. Otherwise no cyanosis, clubbing, edema, or effusions. Neurologic: No gross sensory or motor deficits. Skin: Warm and dry with no rash.     Medical Decision Making:   I have independently reviewed/ordered the following labs:  CBC with Differential:   Recent Labs     12/03/20  1700   WBC 7.2   HGB 14.4   HCT 45.2      LYMPHOPCT 31   MONOPCT 8     BMP:   Recent Labs     12/03/20  1700      K 4.4      CO2 26   BUN 24*   CREATININE 1.29*     Hepatic Function Panel: No results for input(s): PROT, LABALBU, BILIDIR, IBILI, BILITOT, ALKPHOS, ALT, AST in the last 72 hours. No results found for: PROCAL    Lab Results   Component Value Date    CRP 23.2 11/30/2020    CRP 11.3 11/23/2020    CRP 4.8 11/16/2020     No results found for: FERRITIN  No results found for: FIBRINOGEN  No results found for: DDIMER  No results found for: LDH    Lab Results   Component Value Date    SEDRATE 80 (H) 10/19/2020       Lab Results   Component Value Date    COVID19 Not Detected 11/30/2020    COVID19 Not Detected 10/19/2020     No results found for requested labs within last 30 days. Imaging Studies:   Mri Foot Left W Wo Contrast    Result Date: 11/30/2020  EXAMINATION: MRI OF THE LEFT FOOT WITH AND WITHOUT CONTRAST, 11/27/2020 9:40 am TECHNIQUE: Multiplanar multisequence MRI of the left foot was performed with and without the administration of intravenous contrast. COMPARISON: MRI dated 10/20/2020, radiographs dated 10/19/2020 HISTORY: ORDERING SYSTEM PROVIDED HISTORY: Osteomyelitis of toe of left foot ([facility] [address] TECHNOLOGIST PROVIDED HISTORY: 1st metartasal, DM with neuropathy Reason for Exam:  Osteomyelitis of toe of left foot Acuity: Unknown Type of Exam: Unknown Additional signs and symptoms: DM with neuropathy, distal left toe amputation 5 weeks ago, has PIC line on antibiotics. FINDINGS: LISFRANC JOINT: The Lisfranc ligament is intact. Midfoot alignment is within normal limits. BONE MARROW: There is T1 marrow replacement throughout the 1st metatarsal head, neck and extending to the shaft. There is corresponding T2 hyperintensity. Postcontrast imaging demonstrates avid enhancement.   The patient is status post amputation at the level of the 1st MTP joint. There is evidence of prior hardware within the 1st metatarsal.  The hallux sesamoids not identified. No additional sites of marrow replacement are identified. There is no acute fracture or dislocation. GREATER AND LESSER MTP JOINTS: There has been amputation at the 1st MTP joint as mentioned above. There is minimal edema. The lesser MTP joints are intact. SOFT TISSUES: There is a large soft tissue ulceration overlying the 1st metatarsal head. There is fluid attenuation with extending through the ulceration to the level the 1st metatarsal head. This demonstrates peripheral enhancement and measures approximately 1.6 x 1.2 x 2.2 cm. This abscess and phlegmonous change surrounds the 1st metatarsal head. There is extensive enhancement within the adjacent subcutaneous tissues. There is abnormal enhancement at the 1st webspace. There is diffuse fatty atrophy of the intrinsic foot musculature. TENDONS: Aside from the amputation site the flexors are intact. The 2nd MTP joint is held in extension. There is flexion deformity at the 2nd PIP joint. 1. Acute osteomyelitis of the 1st metatarsal head, neck and shaft. 2. Large plantar ulceration at the level of the 1st metatarsal head with associated abscess measuring approximately 1.6 x 2.2 x 1.2 cm. Associated phlegmonous change and cellulitis at the level of the 1st metatarsal head and webspace. Cultures:     Culture, Anaerobic and Aerobic [[id number]]   Collected: 12/03/20 1347    Order Status: Sent  Specimen: Tissue from Toe  Updated: 12/03/20 1951          Thank you for allowing us to participate in the care of this patient. Please call with questions.     Electronically signed by Zechariah Segundo MD on 12/3/2020 at 6:13 PM      [facility]  [address] [facility] messaging  OFFICE: [phone]      This note is created with the assistance of a speech recognition program.  While intending to generate a document that actually reflects the content of the visit, the document can still have some errors including those of syntax and sound a like substitutions which may escape proof reading. In such instances, actual meaning can be extrapolated by contextual diversion.

## 2020-12-03 NOTE — H&P
History and Physical Update    Pt Name: Karli Curran  MRN: [id number]  YOB: 1960  Date of evaluation: 12/3/2020      [x] I have reviewed the Infectious Disease Progress Notes by Dr Dennis Mckenzie dated 11/25/20 in epic which meets the criteria for an Interval History and Physical note and is attached below. [x] I have examined  Karli Curran , a 64 yo morbidly obese male with DM complicated by peripheral neuropathy and CKD. There are no changes to the patient who is scheduled for a resection left first MET - pulsavac by Dr Nasima Goldstein for osteomyelitis left first MET. The patient denies new health changes, fever, chills, wheezing, cough, increased SOB, chest pain, open sores or wounds. +DM POC   Last diclofenac > month    PMH, Surgical History, Social History, Psych, and Family History reviewed and updated in EPIC in appropriate section. Vital signs: BP (!) 156/70   Pulse 73   Temp 96 °F (35.6 °C) (Temporal)   Resp 18   SpO2 96%     Allergies:  Patient has no known allergies. Medications:    Prior to Admission medications    Medication Sig Start Date End Date Taking?  Authorizing Provider   insulin glargine (LANTUS) 100 UNIT/ML injection vial Inject 40 Units into the skin 2 times daily 10/28/20   LIBBY Marques NP   metFORMIN (GLUCOPHAGE) 500 MG tablet Take 1 tablet by mouth 2 times daily (with meals) 10/28/20   MalkaLIBBY Barnard NP   diclofenac (VOLTAREN) 75 MG EC tablet Take 75 mg by mouth 2 times daily    Historical Provider, MD   Liniments (BLUE-EMU SUPER STRENGTH) CREA Apply topically as needed (to hands)    Historical Provider, MD   diclofenac sodium (VOLTAREN) 1 % GEL Apply 2 g topically 4 times daily as needed for Pain    Historical Provider, MD   Handicap Placard MISC by Does not apply route Dispense one temporary handicap placard for six months for ease of transportation 2/23/19   Dareen Alpers, DPM   melatonin 5 MG TABS tablet Take 5-10 mg by mouth nightly     Historical Provider, MD   fluticasone (FLONASE) 50 MCG/ACT nasal spray 1-2 sprays by Each Nostril route daily as needed for Allergies     Historical Provider, MD   Multiple Vitamins-Minerals (THERAPEUTIC MULTIVITAMIN-MINERALS) tablet Take 1 tablet by mouth daily    Historical Provider, MD   magnesium (MAGNESIUM-OXIDE) 250 MG TABS tablet Take 250 mg by mouth daily    Historical Provider, MD   gabapentin (NEURONTIN) 300 MG capsule Take 300 mg by mouth 2 times daily as needed (nerve pain). Historical Provider, MD   allopurinol (ZYLOPRIM) 300 MG tablet Take 300 mg by mouth daily    Historical Provider, MD   zaleplon (SONATA) 10 MG capsule Take 10 mg by mouth nightly    Historical Provider, MD         This is a 63 y.o.morbidly obese male who is pleasant, cooperative, alert and oriented x3, in no acute distress. Heart: dual lumen PICC line right arm Heart sounds are distant. HR 73 regular rate and rhythm without murmur, gallop or rub. Lungs: Normal respiratory effort with diminished breath sounds throughout, unlabored at rest w/o use of accessory muscles, wheezes or rales bilaterally   Abdomen: obese with pannus, soft, nontender, nondistended with bowel sounds.    Extremities:  Dressing right foot  Palpable PT pulse 2+ No calf tenderness bilaterally       Labs:  Recent Labs     11/30/20  0101   HGB 15.2   HCT 49.2   WBC 7.3   MCV 94.4         K 4.2      CO2 24   BUN 24*   CREATININE 1.27*   GLUCOSE 271*       Recent Labs     11/30/20  1240   [address] Not Detected       Jerman Melendez  APRN, ANP-BC  Electronically signed 12/3/2020 at 11:10 AM           Sean Leung MD    Physician    Specialty:  Infectious Diseases    Progress Notes    Signed    Encounter Date:  11/25/2020          Related encounter: Office Visit from 11/25/2020 in [facility] [address] All Collapse All       [facility]  Office Progress Note  Today's Date and Time: 11/25/2020, 9:58 AM     Impression:      1. Diabetic foot infection ([address]    2. Osteomyelitis of toe of left foot ([address]    3. Open wound of plantar aspect of foot, right, sequela          Recommendations ·   The patient continues on intravenous antimicrobial therapy with Unasyn through 11/30/2020. · The x-rays seem to suggest findings concerning for residual/progressing osteomyelitis in the metatarsal and the patient is scheduled for an MRI of the foot on Friday. · I have asked that his PICC line be left in place has there is consideration for further surgery depending on the MRI findings. · I have asked him to call the office and let us know the MRI findings as well as podiatry plans so we can make further decisions for him. I have ordered the following medications/ labs:  No orders of the defined types were placed in this encounter. Encounter Medications    No orders of the defined types were placed in this encounter. Chief complaint/reason for consultation:           Chief Complaint   Patient presents with    Follow-Up from Hospital       from [facility] DR KIM PLUMMER Infected diabetic foot ulcer/osteomyelitis         History of Present Illness:   Efrain Wells is a 63y.o.-year-old male who I am seeing in follow-up after recent hospital stay and he has a history of diabetes mellitus type 2 with associated neuropathy, morbid obesity, obstructive sleep apnea on CPAP, chronic kidney disease, chronic issues with ulceration on the plantar aspect of his left foot but the subfirst metatarsal and subsequently developed infection and osteomyelitis status post great toe amputation with surgical pathology consistent with acute osteomyelitis 10/21/2020. Cultures did grow out Enterococcus faecalis and the patient was discharged on IV antimicrobial therapy with Unasyn through 11/30/2020 to complete a 6-week course of therapy.      The patient now is here for follow-up and reports that he did not do well with the wound VAC initially and the plantar foot incision where he originally had the ulceration did end up having some wound dehiscence that he has had some serous and bloody drainage coming from the wound bed. The patient had an x-ray done by his podiatrist and there was concern for residual infection/osteomyelitis in the metatarsal.     The patient tells me he is scheduled for an MRI of his left foot on Friday. He does not have any subjective fevers or chills. No cough or shortness of breath. No abdominal pain nausea vomiting or diarrhea. I have personally reviewedthe past medical history, medications, social history, and I have updated the database accordingly.   Past Medical History:      Past Medical History        Past Medical History:   Diagnosis Date    Chronic kidney disease      Diabetes mellitus ([facility] [address]      Diabetic neuropathy ([facility] [address]      History of kidney stones      Sleep apnea       uses Bi-Pap nightly        Medications:      Current Facility-Administered Medications   Current Outpatient Medications   Medication Sig Dispense Refill    ampicillin-sulbactam (UNASYN) infusion Infuse 3,000 mg intravenously every 6 hours Compound per protocol 360 g 1    insulin glargine (LANTUS) 100 UNIT/ML injection vial Inject 40 Units into the skin 2 times daily 1 vial 3    metFORMIN (GLUCOPHAGE) 500 MG tablet Take 1 tablet by mouth 2 times daily (with meals) 180 tablet 1    diclofenac (VOLTAREN) 75 MG EC tablet Take 75 mg by mouth 2 times daily        Liniments (BLUE-EMU SUPER STRENGTH) CREA Apply topically as needed (to hands)        diclofenac sodium (VOLTAREN) 1 % GEL Apply 2 g topically 4 times daily as needed for Pain        Handicap Placard [facility] [place] Does not apply route Dispense one temporary handicap placard for six months for ease of transportation 1 each 0    melatonin 5 MG TABS tablet Take 5-10 mg by mouth nightly         fluticasone (FLONASE) 50 MCG/ACT nasal spray 1-2 sprays by Each Nostril route daily as needed for Allergies         Multiple Vitamins-Minerals (THERAPEUTIC MULTIVITAMIN-MINERALS) tablet Take 1 tablet by mouth daily        magnesium (MAGNESIUM-OXIDE) 250 MG TABS tablet Take 250 mg by mouth daily        gabapentin (NEURONTIN) 300 MG capsule Take 300 mg by mouth 2 times daily as needed (nerve pain).  allopurinol (ZYLOPRIM) 300 MG tablet Take 300 mg by mouth daily        zaleplon (SONATA) 10 MG capsule Take 10 mg by mouth nightly          No current facility-administered medications for this visit. Allergies:   Patient has no known allergies. Review of Systems:   Review of Systems   Constitutional: Negative. Respiratory: Negative. Cardiovascular: Negative. Gastrointestinal: Negative. Genitourinary: Negative. Musculoskeletal: Negative. Skin: Positive for wound. Allergic/Immunologic: Negative. Neurological: Negative. Physical Examination :   /77 (Site: Left Upper Arm, Position: Sitting, Cuff Size: Large Adult)   Pulse 76   Temp 97.2 °F (36.2 °C)   Ht 6' 4\" (1.93 m)   Wt (!) 350 lb (158.8 kg)   SpO2 98%   BMI 42.60 kg/m²     Physical Exam  Constitutional:       Appearance: He is well-developed. He is obese. HENT:      Head: Normocephalic and atraumatic. Neck:      Musculoskeletal: Normal range of motion and neck supple. Cardiovascular:      Rate and Rhythm: Normal rate. Heart sounds: Normal heart sounds. No friction rub. No gallop. Pulmonary:      Effort: Pulmonary effort is normal.      Breath sounds: Normal breath sounds. No wheezing. Abdominal:      General: Bowel sounds are normal.      Palpations: Abdomen is soft. There is no mass. Tenderness: There is no abdominal tenderness. Musculoskeletal:      Comments: RUE [facility]   Lymphadenopathy:      Cervical: No cervical adenopathy. Skin:     Comments: The left foot great toe amputation sites has a healing wound.   There is a diversion.

## 2020-12-04 VITALS
BODY MASS INDEX: 38.36 KG/M2 | OXYGEN SATURATION: 96 % | DIASTOLIC BLOOD PRESSURE: 71 MMHG | RESPIRATION RATE: 16 BRPM | TEMPERATURE: 98.8 F | SYSTOLIC BLOOD PRESSURE: 137 MMHG | HEART RATE: 72 BPM | HEIGHT: 76 IN | WEIGHT: 315 LBS

## 2020-12-04 PROCEDURE — 6360000002 HC RX W HCPCS: Performed by: STUDENT IN AN ORGANIZED HEALTH CARE EDUCATION/TRAINING PROGRAM

## 2020-12-04 PROCEDURE — 97535 SELF CARE MNGMENT TRAINING: CPT

## 2020-12-04 PROCEDURE — G0378 HOSPITAL OBSERVATION PER HR: HCPCS

## 2020-12-04 PROCEDURE — 6370000000 HC RX 637 (ALT 250 FOR IP): Performed by: STUDENT IN AN ORGANIZED HEALTH CARE EDUCATION/TRAINING PROGRAM

## 2020-12-04 PROCEDURE — 6370000000 HC RX 637 (ALT 250 FOR IP): Performed by: NURSE PRACTITIONER

## 2020-12-04 PROCEDURE — 99225 PR SBSQ OBSERVATION CARE/DAY 25 MINUTES: CPT | Performed by: INTERNAL MEDICINE

## 2020-12-04 PROCEDURE — 97116 GAIT TRAINING THERAPY: CPT

## 2020-12-04 PROCEDURE — 2580000003 HC RX 258: Performed by: STUDENT IN AN ORGANIZED HEALTH CARE EDUCATION/TRAINING PROGRAM

## 2020-12-04 PROCEDURE — 82947 ASSAY GLUCOSE BLOOD QUANT: CPT

## 2020-12-04 PROCEDURE — 97166 OT EVAL MOD COMPLEX 45 MIN: CPT

## 2020-12-04 PROCEDURE — 97162 PT EVAL MOD COMPLEX 30 MIN: CPT

## 2020-12-04 PROCEDURE — 96372 THER/PROPH/DIAG INJ SC/IM: CPT

## 2020-12-04 PROCEDURE — 6370000000 HC RX 637 (ALT 250 FOR IP): Performed by: INTERNAL MEDICINE

## 2020-12-04 RX ORDER — INSULIN GLARGINE 100 [IU]/ML
40 INJECTION, SOLUTION SUBCUTANEOUS 2 TIMES DAILY
Status: DISCONTINUED | OUTPATIENT
Start: 2020-12-04 | End: 2020-12-04 | Stop reason: HOSPADM

## 2020-12-04 RX ORDER — OXYCODONE HYDROCHLORIDE AND ACETAMINOPHEN 5; 325 MG/1; MG/1
1 TABLET ORAL EVERY 6 HOURS PRN
Qty: 28 TABLET | Refills: 0 | Status: SHIPPED | OUTPATIENT
Start: 2020-12-04 | End: 2020-12-11

## 2020-12-04 RX ADMIN — INSULIN LISPRO 6 UNITS: 100 INJECTION, SOLUTION INTRAVENOUS; SUBCUTANEOUS at 08:02

## 2020-12-04 RX ADMIN — AMPICILLIN SODIUM AND SULBACTAM SODIUM 1.5 G: 1; .5 INJECTION, POWDER, FOR SOLUTION INTRAMUSCULAR; INTRAVENOUS at 00:25

## 2020-12-04 RX ADMIN — HEPARIN SODIUM 5000 UNITS: 5000 INJECTION INTRAVENOUS; SUBCUTANEOUS at 05:41

## 2020-12-04 RX ADMIN — AMPICILLIN SODIUM AND SULBACTAM SODIUM 1.5 G: 1; .5 INJECTION, POWDER, FOR SOLUTION INTRAMUSCULAR; INTRAVENOUS at 09:49

## 2020-12-04 RX ADMIN — FLUTICASONE PROPIONATE 1 SPRAY: 50 SPRAY, METERED NASAL at 03:45

## 2020-12-04 RX ADMIN — OXYCODONE AND ACETAMINOPHEN 2 TABLET: 5; 325 TABLET ORAL at 08:02

## 2020-12-04 RX ADMIN — INSULIN LISPRO 6 UNITS: 100 INJECTION, SOLUTION INTRAVENOUS; SUBCUTANEOUS at 12:32

## 2020-12-04 RX ADMIN — METFORMIN HYDROCHLORIDE 500 MG: 500 TABLET ORAL at 09:49

## 2020-12-04 RX ADMIN — INSULIN GLARGINE 40 UNITS: 100 INJECTION, SOLUTION SUBCUTANEOUS at 09:49

## 2020-12-04 RX ADMIN — MAGNESIUM GLUCONATE 500 MG ORAL TABLET 200 MG: 500 TABLET ORAL at 08:02

## 2020-12-04 RX ADMIN — OXYCODONE AND ACETAMINOPHEN 2 TABLET: 5; 325 TABLET ORAL at 03:43

## 2020-12-04 RX ADMIN — ALLOPURINOL 300 MG: 300 TABLET ORAL at 12:31

## 2020-12-04 ASSESSMENT — ENCOUNTER SYMPTOMS
DIARRHEA: 0
CHEST TIGHTNESS: 0
COLOR CHANGE: 1
CONSTIPATION: 0
NAUSEA: 0
SHORTNESS OF BREATH: 0
VOMITING: 0
ABDOMINAL PAIN: 0
WHEEZING: 0
COUGH: 0
BLOOD IN STOOL: 0

## 2020-12-04 ASSESSMENT — PAIN DESCRIPTION - PAIN TYPE: TYPE: SURGICAL PAIN

## 2020-12-04 ASSESSMENT — PAIN DESCRIPTION - LOCATION: LOCATION: FOOT

## 2020-12-04 ASSESSMENT — PAIN DESCRIPTION - DESCRIPTORS: DESCRIPTORS: ACHING

## 2020-12-04 ASSESSMENT — PAIN SCALES - GENERAL
PAINLEVEL_OUTOF10: 5
PAINLEVEL_OUTOF10: 7
PAINLEVEL_OUTOF10: 7

## 2020-12-04 ASSESSMENT — PAIN DESCRIPTION - ONSET: ONSET: ON-GOING

## 2020-12-04 ASSESSMENT — PAIN DESCRIPTION - ORIENTATION: ORIENTATION: LEFT

## 2020-12-04 ASSESSMENT — PAIN DESCRIPTION - FREQUENCY: FREQUENCY: CONTINUOUS

## 2020-12-04 ASSESSMENT — PAIN DESCRIPTION - PROGRESSION: CLINICAL_PROGRESSION: NOT CHANGED

## 2020-12-04 NOTE — PROGRESS NOTES
Pt discharged to home in good condition with belongings  Discharge instructions given & signed  \"Meds To Beds\" medication at bedside  Pt denies having any further questions at this time  Locked up home medication(s)/personal items given to patient at discharge  Patient/family state they have everything they were admitted with.   Hibiclens sent home with patient  Joyce following pt after discharge

## 2020-12-04 NOTE — CARE COORDINATION
DC Planning    Gladys Rodolfo  informed writer he did speak with Dr. Yoel Pelaez and Unasyn dosing changed from 1.5 GM Q 8H TO Unasyn 3GM  IV Q 6. This was patient's previous dosing. Perfect serve to Dr. Yoel Pelaez confirmed dose change and changed in med rec to reflect accurate dosing. Spoke with videof.me about pump, pt. Will not need a new pump or pump dose change. Gladys will send additional batteries tonight and supplies and should reach to patient's house around 6 pm. Pt. Insurance is still active and will be covered. Informed pt. [place] notified of dc today and unasyn dose change. The Plan for Transition of Care is related to the following treatment goals: skilled nursing, IV abx    The Patient and/or patient representative  was provided with a choice of provider and agrees   with the discharge plan. [x] Yes [] No    Freedom of choice list was provided with basic dialogue that supports the patient's individualized plan of care/goals, treatment preferences and shares the quality data associated with the providers.  [x] Yes [] No

## 2020-12-04 NOTE — CARE COORDINATION
DC planning    Faxed RX and facesheet for Unasyn to [employer] [phone], will try to get approved today-pt. Knows how to run his own abx.

## 2020-12-04 NOTE — PROGRESS NOTES
Occupational Therapy   Occupational Therapy Initial Assessment  Date: 2020   Patient Name: Mariam Still  MRN: [id number]     : 1960    Date of Service: 2020    Discharge Recommendations:  Home with assist PRN     RN reports patient is medically stable for therapy treatment this date. Chart reviewed prior to treatment and patient is agreeable for therapy. All lines intact and patient positioned comfortably at end of treatment. All patient needs addressed prior to ending therapy session. Assessment   Performance deficits / Impairments: Decreased functional mobility ; Decreased safe awareness  Prognosis: Good  Decision Making: Medium Complexity  OT Education: OT Role;Plan of Care;Equipment; Family Education;Precautions; ADL Adaptive Strategies;Transfer Training;Energy Conservation;Home Exercise Program  Patient Education: NWB precautions, hopping tech,  REQUIRES OT FOLLOW UP: Yes  Activity Tolerance  Activity Tolerance: Patient Tolerated treatment well  Safety Devices  Safety Devices in place: Yes  Type of devices: Left in bed;Nurse notified;Call light within reach;Gait belt  Restraints  Initially in place: No           Patient Diagnosis(es): The encounter diagnosis was Acute post-operative pain. has a past medical history of Chronic kidney disease, Diabetes mellitus ([facility] Diabetic neuropathy ([facility] History of kidney stones, and Sleep apnea. has a past surgical history that includes Lithotripsy (); Appendectomy; Nasal septum surgery; Foot Debridement (Left, 2016); skin split graft (Left, 2016); Foot surgery (Left, 2019); Foot Debridement (Left, 2019); Foot surgery (Left, 2019); Foot surgery (Left, 2019); Foot Debridement (Left, 3/12/2019); other surgical history (Left, 2019); Skin graft (Left, 4/3/2019); Foot Debridement (Left, 2019); Skin graft (Left, 2019); Skin graft (Left, 2019); Toe amputation (Left, 10/21/2020);  IR INSERT PICC VAD W SQ PORT >5 YEARS (10/26/2020); and arthroplasty (Left, 12/3/2020). Restrictions  Restrictions/Precautions  Restrictions/Precautions: Weight Bearing, Fall Risk, Up as Tolerated  Required Braces or Orthoses?: No  Lower Extremity Weight Bearing Restrictions  Right Lower Extremity Weight Bearing: Non Weight Bearing(sx shoe on LLE)  Left Lower Extremity Weight Bearing: Non Weight Bearing(sx shoe on LLE)  Partial Weight Bearing Percentage Or Pounds: Pt constantly putting weight on LLE despite cueing, poor follow through and reception to instructions. Upper Extremity Weight Bearing Restrictions  Other: Despite strict NWB status, pt is non-compliant stating he only puts pressure on his heel. Throughout the session, pt kept putting weight through LLE even with MAX cues.   Position Activity Restriction  Other position/activity restrictions: PICC line R UE, NWB LLE    Subjective   General  Chart Reviewed: Yes  Patient assessed for rehabilitation services?: Yes  Response to previous treatment: Patient with no complaints from previous session  Family / Caregiver Present: No    Social/Functional History  Social/Functional History  Lives With: Alone  Type of Home: House  Home Layout: One level  Home Access: Stairs to enter with rails  Entrance Stairs - Number of Steps: 2  Entrance Stairs - Rails: Both  Bathroom Shower/Tub: Walk-in shower(pt has been sponge bathing)  Bathroom Toilet: Handicap height(window sill nearby for support)  Bathroom Equipment: Grab bars in shower  Home Equipment: Wheelchair-manual, Crutches(uses w/c and crutches around home and uses knee scooter for appointments)  Receives Help From: Family(dtr and son)  ADL Assistance: Independent(1x week HHA to change PICC dressing)  Homemaking Assistance: Independent  Ambulation Assistance: Independent  Transfer Assistance: Independent  Active : Yes  Mode of Transportation: Car  Occupation: Full time employment  Type of occupation: truck   Leisure & Hobbies: \"try not to bleed\", read a lot  Additional Comments: denies recent falls       Objective   Vision: Within Functional Limits  Vision Exceptions: Wears glasses for reading  Hearing: Within functional limits    Orientation  Overall Orientation Status: Within Normal Limits  Observation/Palpation  Posture: Good  Observation: Pt sitting on EOB, agreeable to therapy. Scar: L foot wrapped  Balance  Sitting Balance: Supervision  Functional Mobility  Functional - Mobility Device: Crutches(knee scooter)  Assist Level: Moderate assistance  Functional Mobility Comments: Pt utilized crutches for transferring to knee scooter, required consistent cueing for NWB precautions with Pt constantly putting weight on L foot, poor follow through and reception to instructions. Trialed portable steps with MOD A for increased safety as Pt unable to maintain NWB despite cueing, impulsive on steps and has tendency to move \"too quickly\"  ADL  Feeding: Independent; Modified independent   Grooming: Independent; Modified independent   UE Bathing: Supervision  LE Bathing: Stand by assistance  UE Dressing: Supervision  LE Dressing: Stand by assistance  Toileting: Stand by assistance  Additional Comments: Pt donned pants and surgical boot with SBA, completed while sitting on EOB and required no additional assist.  Tone RUE  RUE Tone: Normotonic  Tone LUE  LUE Tone: Normotonic  Coordination  Movements Are Fluid And Coordinated: Yes     Bed mobility  Supine to Sit: Unable to assess  Sit to Supine: Unable to assess  Comment: Pt sitting on EOB upon arrival and left sitting on EOB at end of session. Transfers  Sit to stand: Stand by assistance  Stand to sit: Stand by assistance  Transfer Comments: Pt able to sit<>stand with SBA however unable to maintain NWB on LLE as Pt putting weight on L heel despite multiple cueing.      Cognition  Overall Cognitive Status: Exceptions  Arousal/Alertness: Appropriate responses to Patient goals : to not bleed more       Therapy Time   Individual Concurrent Group Co-treatment   Time In 0827         Time Out 4038         Minutes 29               Co-treatment with PT warranted first time up day of surgery. Cotx due to potential risk of decreased sensation, muscle control and proprioception from spinal epidural and/or regional block. Decreased safety and independence requiring 2 skilled therapy professionals to address individual discipline's goals. Upon writer exit, call light within reach, pt retired to bed. All lines intact and patient positioned comfortably. All patient needs addressed prior to ending therapy session. Chart reviewed prior to treatment and patient is agreeable for therapy. RN reports patient is medically stable for therapy treatment this date.     Kristofer Beckett, OT

## 2020-12-04 NOTE — CARE COORDINATION
Case Management Initial Discharge Plan  Moosesami Emiilanos,         Readmission Risk              Risk of Unplanned Readmission:        0             Met with:patient to discuss discharge plans. Information verified: address, contacts, phone number, , insurance Yes  PCP: Francine Turner MD  Date of last visit: 1 month ago    Insurance Provider: MARISA    Discharge Planning  Current Residence:  [address] [facility] Arrangements:  Alone   Home has 0 stories/2 stairs to climb into home  Support Systems:  Children, Family Members  Current Services PTA:   VNS Agency: [facility]  Patient able to perform ADL's:Independent  DME in home: Iv pump, wc, scooter, crutches, bipap at hs  DME used to aid ambulation prior to admission:   Scooter   DME used during admission:  Crutches     Potential Assistance Needed:  Outpatient IV, Home Care    Pharmacy: [facility] for [facility]   Potential Assistance Purchasing Medications:  No  Does patient want to participate in local refill/ meds to beds program?  Yes    Patient agreeable to home care: Yes  Freedom of choice provided:  yes      Type of Home Care Services:  PT, OT  Patient expects to be discharged to:  home    Prior SNF/Rehab Placement and Facility: no   Agreeable to SNF/Rehab: No  Ezel of choice provided: n/a   Evaluation: n/a    Expected Discharge date:  20  Follow Up Appointment: Best Day/ Time: Monday PM    Transportation provider: kimberly  Transportation arrangements needed for discharge: No    Discharge Plan:  Pt. From home alone. Current with [facility] for iv abx, dressing changes, PICC care. Finished IV course of Unasyn , will need additional 2 weeks of Unasyn 1.5 gm IV q 8h  x 14 days. Spoke with Ryan Brown she will run benefits. Spoke with Deniz Burgos from [facility] they will resume care aware he may dc today.          Electronically signed by Grace Daley RN on 20 at 10:46 AM EST

## 2020-12-04 NOTE — DISCHARGE INSTR - COC
Continuity of Care Form    Patient Name: Dennie Clinton   :  1960  MRN:  [id number]    [facility] date:  12/3/2020  Discharge date:  2020    Code Status Order: Full Code   Advance Directives:   Advance Care Flowsheet Documentation       Date/Time Healthcare Directive Type of Healthcare Directive Copy in [address] Agent's Name Healthcare Agent's Phone Number    20 1636  No, patient does not have an advance directive for healthcare treatment -- -- -- -- --    20 1121  No, patient does not have an advance directive for healthcare treatment -- -- -- -- --    20 1120  No, patient does not have an advance directive for healthcare treatment -- -- -- -- --            Admitting Physician:  Farooq Varner DPM  PCP: My Finley MD    Discharging Nurse: [facility] Unit/Room#: 2102/2102-01  [address] [facility] Phone Number: [phone]    Emergency Contact:   Extended Emergency Contact Information  Primary Emergency Contact: Mac Luis [address] Phone: [phone]  Relation: Brother/Sister    Past Surgical History:  Past Surgical History:   Procedure Laterality Date    APPENDECTOMY      ARTHROPLASTY Left 12/3/2020    RESECTION LEFT FIRST MET PLANTAR WOUND DEBRIDEMENT performed by Farooq Varner DPM at [facility] Left 2016    FOOT DEBRIDEMENT Left 2019    LEFT FOOT   INCISION AND DRAINAGE WITH WOUND VAC APPLICATION performed by Farooq Varner DPM at [facility] Left 3/12/2019    LEFT FOOT WOUND [facility] performed by Farooq Varner DPM at [facility] Left     Application of 44 units of Apligraf biologic graft, left foot    FOOT SURGERY Left 2019    LEFT FOOT   INCISION AND DRAINAGE WITH WOUND VAC APPLICATION (Left )    FOOT SURGERY Left 2019    DELAYED PRIMARY CLOSURE LEFT FOOT      PULSEVAC performed by Lizet Lam Gifty Ames at [facility]. Left 03/12/2019    wound debridement    IR INS PICC VAD W SQ PORT GREATER THAN 5  10/26/2020    IR INS PICC VAD W SQ PORT GREATER THAN 5 10/26/2020 Adonis Samuel MD [facility] SPECIAL PROCEDURES    LITHOTRIPSY  2013    NASAL SEPTUM SURGERY      OTHER SURGICAL HISTORY Left 04/03/2019    foot debridement and eppifix application    SKIN GRAFT Left 4/3/2019    THERASKIN AND EPIFIX APPLICATION  AND  LEFT FOOT WOUND DEBRIDEMENT AND WOUND PREP performed by Gale Drew DPM at [address] [place] [address] Left 4/18/2019    LEFT FOOT APPLICATION APPLIGRAFT performed by Gale Drew DPM at [address] [place] [address] Left 1/8/3483    GRAFT APPLICATION LEFT FOOT - WCB performed by Gale Drew DPM at [facility] Left 1/2016    foot    TOE AMPUTATION Left 10/21/2020    LEFT PARTIAL FIRST RAY  AMPUTATION performed by Stepan Buckley DPM at [address] [place] [address]       Immunization History:   Immunization History   Administered Date(s) Administered    Influenza Vaccine, unspecified formulation 10/01/2011, 11/01/2012, 11/01/2013    Influenza Virus Vaccine 10/01/2011, 11/01/2012, 11/01/2013, 11/03/2014, 10/30/2015, 09/17/2020    Influenza, High Dose (Fluzone 65 yrs and older) 11/03/2017    Influenza, MDCK Quadv, with preserv IM (Flucelvax 4 yrs and older) 10/16/2018, 11/21/2019    Influenza, Azalee Fees, IM, (6 mo and older Fluzone, Flulaval, Fluarix and 3 yrs and older Afluria) 11/03/2014, 10/30/2015, 10/16/2018    Influenza, Azalee Fees, IM, PF (6 mo and older Fluzone, Flulaval, Fluarix, and 3 yrs and older Afluria) 11/03/2014, 10/30/2015, 09/17/2020    Pneumococcal Conjugate 13-valent (Rccidsm26) 11/03/2017    Pneumococcal Polysaccharide ([id number]) 11/01/2011       Active Problems:  Patient Active Problem List   Diagnosis Code    Diabetes mellitus type 2 in obese (HCC) E11.69, E66.9    [facility] left [facility] layer [facility] L97.522    Type 2 diabetes mellitus with [facility]  Independent  Med Delivery   whole    Wound Care Documentation and Therapy:        Elimination:  Continence:   · Bowel: Yes  · Bladder: Yes  Urinary Catheter: None   Colostomy/Ileostomy/Ileal Conduit: No       Date of Last BM: 12/2/2020    Intake/Output Summary (Last 24 hours) at 12/4/2020 1137  Last data filed at 12/4/2020 0807  Gross per 24 hour   Intake 2044 ml   Output 2645 ml   Net -601 ml     I/O last 3 completed shifts: In: 2044 [P.O.:300; I.V.:1244; IV Piggyback:500]  Out: 2360 [Urine:2250; Drains:100; Blood:10]    Safety Concerns: At Risk for Falls    Impairments/Disabilities:      None    Nutrition Therapy:  Current Nutrition Therapy:   - Oral Diet:  Carb Control 4 carbs/meal (1800kcals/day)    Routes of Feeding: Oral  Liquids: No Restrictions  Daily Fluid Restriction: no  Last Modified Barium Swallow with Video (Video Swallowing Test): not done    Treatments at the Time of Hospital Discharge:   Respiratory Treatments: BIPAP at night for RAÚL  Oxygen Therapy:  is not on home oxygen therapy.   Ventilator:    - BiPAP    ,   only when sleeping    Rehab Therapies: Physical Therapy and Occupational Therapy  Weight Bearing Status/Restrictions: No weight bearing restirctions  Other Medical Equipment (for information only, NOT a DME order):  crutches and hospital bed  Other Treatments: skilled nursing assessment, med teaching and compliance, PICC care    Patient's personal belongings (please select all that are sent with patient):  None    RN SIGNATURE:  Electronically signed by Hortencia Mcdonnell RN on 12/4/20 at 11:40 AM EST    CASE MANAGEMENT/SOCIAL WORK SECTION    Inpatient Status Date: ***    Readmission Risk Assessment Score:  Readmission Risk              Risk of Unplanned Readmission:        0           Discharging to Facility/ Agency   · Name: [facility]  · [address] [facility]  · [address]  · Phone: [phone]  · Fax: [phone]    [facility]    Address: [address] [place] [address]   [place], [address]  · Phone: [phone]  · Fax: [phone]    / signature: Electronically signed by Kathy Maguire RN on 12/4/20 at 12:34 PM EST    PHYSICIAN SECTION    Prognosis: Good    Condition at Discharge: Stable    Rehab Potential (if transferring to Rehab): Fair    Recommended Labs or Other Treatments After Discharge:     Physician Certification: I certify the above information and transfer of Bethany Coronado  is necessary for the continuing treatment of the diagnosis listed and that he requires [address] for greater 30 days.      Update Admission H&P: No change in H&P    PHYSICIAN SIGNATURE:  Electronically signed by Laurie Anna DPM on 12/4/2020 at 11:45 AM

## 2020-12-04 NOTE — PROGRESS NOTES
[facility]    Office: [address], , Amarjit Whitaker, DO, Donaldann marie Hernández, DO, Ayse Garciaslotus Blood, DO, Olivia Bah MD, Crystal Florence MD, Loretta Anne MD, Carl Traore MD, Dio Tirado MD, Alex Barr MD, Jay Valdovinos MD, Julio Mcnamara MD, Mbonu Dorie Franco MD, Bear Garcia, DO, Mckayla Murcia MD, aPty Witt MD, Janina Bermudez, DO, Elvira Chung MD,  Cheyanne Lee, DO, Kesha Rey MD, Ralph Paul MD, Kevin [facility], [facility], CNP, Mike Wyman, CNP, Mary Golden, CNS, Jyoti Pierre, CNP, Rene Pichardo, CNP, Oscar Guevara, CNP, Brenda Modi, CNP, Airam Yanez, CNP, Araceli Baig PA-C, Verna Abebe, [facility], Paul Garcia, CNP, Kirk Mendoza, CNP, Chino Graves, CNP, Sofia Elizabeth, CNP, Angelito Mccormick, [address]    Progress Note    12/4/2020    11:07 AM    Name:   Mary Hamilton  MRN:     [id number]     [place]:      [de-identified]   Room:   210/210201   Day:  0  Admit Date:  12/3/2020 10:42 AM    PCP:   Susie Brunner, MD  Code Status:  Full Code    Subjective:     C/C:  Foot wound    Interval History Status:  improved  Postop day 1  Pain controlled  Ambulating with walker  Hopes to go home today    Data Base Updates:  Glucose 255High mg/dL     BUN 24High mg/dL CREATININE 1. 29High     Follow-up x-ray:  Impression:  Interval amputation of the great toe. No evidence of complication. No   evidence of osteomyelitis. Culture:  Specimen Source: Toe Specimen Description . TOE LEFT FIRST Special Requests NOT REPORTED Direct Exam NO NEUTROPHILS SEEN NO BACTERIA SEEN Culture PENDING     Brief History:     As documented in the medical record:  \"Shad Nixon is a 63year old male who presents post operatively from a complete resection of the first metatarsal of the left foot, wound debridement of chronic nonhealing wound sub-first metatarsal of the left foot.   Patient states that he has had the wound for approximately 1 year. MRI of the left foot was positive for osteomyelitis. The patient has been receiving Unasyn via PICC line. He endorses neuropathy to his feet bilaterally. He states that his pain is well controlled on the current medicinal regimen. No postoperative nausea, vomiting, chest pain or shortness of breath. He has past medical history that includes RAÚL with BiPAP use, diabetes and CKD. \"     Results for Hortensia Parmar (MRN [id number]) as of 12/4/2020 11:04   Ref. Range 11/10/2020 17:30 11/16/2020 10:30 11/23/2020 10:00 11/30/2020 01:01 12/3/2020 17:00   Creatinine Latest Ref Range: 0.70 - 1.20 mg/dL 1.48 (H) 1.21 (H) 1.14 1.27 (H) 1.29 (H)     Results for Hortensia Parmar (MRN [id number]) as of 12/4/2020 11:04   Ref. Range 11/30/2020 01:01 12/3/2020 11:38 12/3/2020 12:46 12/3/2020 14:58 12/3/2020 17:00   Glucose Latest Ref Range: 70 - 99 mg/dL 271 (H)    255 (H)   POC Glucose Latest Ref Range: 75 - 110 mg/dL  290 (H) 273 (H) 207 (H)        Medications:      Allergies:  No Known Allergies    Current Meds:   Scheduled Meds:    insulin glargine  40 Units Subcutaneous BID    metFORMIN  500 mg Oral BID WC    allopurinol  300 mg Oral Daily    magnesium oxide  200 mg Oral Daily    melatonin  6 mg Oral Nightly    LORazepam  0.5 mg Oral Nightly    sodium chloride flush  10 mL Intravenous 2 times per day    heparin (porcine)  5,000 Units Subcutaneous 3 times per day    ampicillin-sulbactam  1.5 g Intravenous Q8H    insulin lispro  0-18 Units Subcutaneous TID WC    insulin lispro  0-9 Units Subcutaneous Nightly     Continuous Infusions:    dextrose       PRN Meds: gabapentin, sodium chloride flush, acetaminophen **OR** acetaminophen, polyethylene glycol, promethazine **OR** ondansetron, oxyCODONE-acetaminophen **OR** oxyCODONE-acetaminophen, glucose, dextrose, glucagon (rDNA), dextrose, fluticasone    Data:     Past Medical History:   has a past medical history of Chronic kidney disease, Diabetes mellitus ([facility] Diabetic neuropathy ([facility] [facility] History of kidney stones, and Sleep apnea. Social History:   reports that he has never smoked. He has never used smokeless tobacco. He reports current alcohol use. He reports that he does not use drugs. Family History:   Family History   Problem Relation Age of Onset    Heart Disease Mother     Heart Disease Father        Review of Systems:     Review of Systems   Constitutional: Positive for activity change (Remains reduced). Respiratory: Negative for cough and shortness of breath. Cardiovascular: Negative for chest pain and palpitations. Gastrointestinal: Negative for abdominal pain, nausea and vomiting. Endocrine: Negative for polydipsia, polyphagia and polyuria. Genitourinary: Negative for flank pain and hematuria. Musculoskeletal: Positive for gait problem (Due to foot wound). Skin: Positive for wound (See chief complaint). Physical Examination:        Physical Exam  Vitals signs reviewed. Constitutional:       General: He is not in acute distress. Appearance: He is not diaphoretic. HENT:      Head: Normocephalic. Nose: Nose normal.   Eyes:      General: No scleral icterus. Conjunctiva/sclera: Conjunctivae normal.   Neck:      Musculoskeletal: Neck supple. Trachea: No tracheal deviation. Cardiovascular:      Rate and Rhythm: Normal rate and regular rhythm. Pulmonary:      Effort: Pulmonary effort is normal. No respiratory distress. Breath sounds: Normal breath sounds. No wheezing or rales. Chest:      Chest wall: No tenderness. Abdominal:      General: Bowel sounds are normal. There is no distension. Palpations: Abdomen is soft. Tenderness: There is no abdominal tenderness. Musculoskeletal:         General: Swelling and tenderness (Incisional) present. Skin:     General: Skin is warm and dry.       Comments: Foot dressed, dry, clean   Neurological:      Mental Status: He is alert and oriented to person, place, and time. Prior photo:          Vitals:  /71   Pulse 72   Temp 98.8 °F (37.1 °C) (Oral)   Resp 16   Ht 6' 4\" (1.93 m)   Wt (!) 356 lb (161.5 kg)   SpO2 96%   BMI 43.33 kg/m²   Temp (24hrs), Av.2 °F (36.8 °C), Min:97.5 °F (36.4 °C), Max:98.8 °F (37.1 °C)    Recent Labs     20  1138 20  1246 20  1458   POCGLU 290* 273* 207*       I/O (24Hr): Intake/Output Summary (Last 24 hours) at 2020 1107  Last data filed at 2020 9192  Gross per 24 hour   Intake 2044 ml   Output 2645 ml   Net -601 ml       Labs:  Hematology:  Recent Labs     20  1700   WBC 7.2   RBC 4.83   HGB 14.4   HCT 45.2   MCV 93.6   MCH 29.8   MCHC 31.9   RDW 13.0      MPV 8.8     Chemistry:  Recent Labs     20  1700      K 4.4      CO2 26   GLUCOSE 255*   BUN 24*   CREATININE 1.29*   ANIONGAP 10   LABGLOM 57*   GFRAA >60   CALCIUM 9.7     Recent Labs     20  1138 20  1246 20  1458   POCGLU 290* 273* 207*     ABG:No results found for: POCPH, PHART, PH, POCPCO2, IBE9CFE, PCO2, POCPO2, PO2ART, PO2, POCHCO3, BEN3SCX, HCO3, NBEA, PBEA, BEART, BE, THGBART, THB, LJU2UPZ, XQCV5NAE, X5ZTCGZK, O2SAT, FIO2  Lab Results   Component Value Date/Time    SPECIAL NOT REPORTED 2020 01:47 PM     Lab Results   Component Value Date/Time    CULTURE PENDING 2020 01:47 PM       Radiology:  Xr Foot Left (min 3 Views)    Result Date: 12/3/2020  Interval amputation of the great toe. No evidence of complication. No evidence of osteomyelitis. Mri Foot Left W Wo Contrast    Result Date: 2020  1. Acute osteomyelitis of the 1st metatarsal head, neck and shaft. 2. Large plantar ulceration at the level of the 1st metatarsal head with associated abscess measuring approximately 1.6 x 2.2 x 1.2 cm. Associated phlegmonous change and cellulitis at the level of the 1st metatarsal head and webspace.          Assessment:        Principal Problem:    Osteomyelitis (HCC)  Active Problems:    Type 2 diabetes mellitus with hyperglycemia, with long-term current use of insulin (HCC)    Obstructive sleep apnea    Class 3 severe obesity due to excess calories with serious comorbidity and body mass index (BMI) of 40.0 to 44.9 in adult [facility])    CKD (chronic kidney disease) stage 3, GFR 30-59 ml/min  Resolved Problems:    Acute post-operative pain      Plan:        Glycemic contol - monitor and control blood sugars   Glycemic contol - monitor and control blood sugars  Resume Lantus  Resume Metformin   Check bun and creatinine   Podiatry evaluation and follow-up as scheduled  Antibiotics per Infectious Disease service  Risk factor management / weight loss    CPAP  Discharge planning in progress  The patient was instructed to follow up with their PCP, Marizol Singletary MD in one week       PHARMACY TO [address] [place], [place] [address]IST  IP CONSULT TO INFECTIOUS DISEASES  IP CONSULT TO [address], DO  12/4/2020  11:07 AM

## 2020-12-04 NOTE — FLOWSHEET NOTE
Patient is reclining in the bed while wearing street clothes. Patient is approachable and engages in conversation. Patient reports that he is being discharged. Patient has family members for support and denies any spiritual or emotional concerns. Patient seems to accept the visit. Spiritual Care will follow as needed.        12/04/20 1000   Encounter Summary   Services provided to: Patient   Referral/Consult From: [facility] Family members   Continue Visiting   (12/4/20)   Complexity of Encounter Low   Length of Encounter 15 minutes   Routine   Type Initial   Assessment Approachable   Intervention Active listening;Explored feelings, thoughts, concerns;Explored coping resources;Nurtured hope   Outcome Acceptance

## 2020-12-04 NOTE — PROGRESS NOTES
CLINICAL PHARMACY NOTE: MEDS TO [address] Select Patient?: No  Total # of Prescriptions Filled: 1   The following medications were delivered to the patient:  · OXYCODONE/APAP 5/325 MG TABS  Total # of Interventions Completed: 0  Time Spent (min): 5    Additional Documentation:    DELIVERED TO PT'S ROOM 12/4/20. $8.43 COPAY, PT PAID CASH.

## 2020-12-04 NOTE — PLAN OF CARE
Problem: Falls - Risk of:  Goal: Will remain free from falls  Description: Will remain free from falls  Outcome: Ongoing  Note: Siderails up x 2  Hourly rounding  Call light in reach  Instructed to call for assist before attempting out of bed. Remains free from falls and accidental injury at this time   Floor free from obstacles  Bed is locked and in lowest position  Adequate lighting provided  Bed alarm on, Red Falling star and Stay with Me signs posted       Problem: Pain:  Goal: Pain level will decrease  Description: Pain level will decrease  Outcome: Ongoing  Note: Pain level assessment complete.    Patient educated on pain scale and control interventions  PRN pain medication given per patient request  Patient instructed to call out with new onset of pain or unrelieved pain       Problem: Skin Integrity:  Goal: Will show no infection signs and symptoms  Description: Will show no infection signs and symptoms  Outcome: Ongoing  Note: Antibiotics as ordered  Patient to go home with IV atb

## 2020-12-04 NOTE — DISCHARGE INSTR - COC
Continuity of Care Form    Patient Name: Carlos Amaya   :  1960  MRN:  [id number]    [address] [place] [address] date:  12/3/2020  Discharge date:  ***    Code Status Order: Full Code   Advance Directives:   Advance Care Flowsheet Documentation       Date/Time Healthcare Directive Type of Healthcare Directive Copy in [address] Agent's Name Healthcare Agent's Phone Number    20 1636  No, patient does not have an advance directive for healthcare treatment -- -- -- -- --    20 1121  No, patient does not have an advance directive for healthcare treatment -- -- -- -- --    20 1120  No, patient does not have an advance directive for healthcare treatment -- -- -- -- --            Admitting Physician:  Mckinley Jimenez DPM  PCP: Maura De Leon MD    Discharging Nurse: [facility] Unit/Room#: 2102/2102-01  Discharging Unit Phone Number: ***    Emergency Contact:   Extended Emergency Contact Information  Primary Emergency Contact: Jaycee Garcia [address] Phone: [phone]  Relation: Brother/Sister    Past Surgical History:  Past Surgical History:   Procedure Laterality Date    APPENDECTOMY      ARTHROPLASTY Left 12/3/2020    RESECTION LEFT FIRST MET PLANTAR WOUND DEBRIDEMENT performed by Mckinley Jimenez DPM at [address] [place] [address] Left 2016    FOOT DEBRIDEMENT Left 2019    LEFT FOOT   INCISION AND DRAINAGE WITH WOUND VAC APPLICATION performed by Mckinley Jimenez DPM at [address] [place] [address] Left 3/12/2019    LEFT FOOT WOUND [facility] performed by Mckinley Jimenez DPM at [address] [place] [address] Left     Application of 44 units of Apligraf biologic graft, left foot    FOOT SURGERY Left 2019    LEFT FOOT   INCISION AND DRAINAGE WITH WOUND VAC APPLICATION (Left )    FOOT SURGERY Left 2019    DELAYED PRIMARY CLOSURE LEFT FOOT      PULSEVAC performed by Mckinley Jimenez DPM at [place] [address] Left 03/12/2019    wound debridement    IR INS PICC VAD W SQ PORT GREATER THAN 5  10/26/2020    IR INS PICC VAD W SQ PORT GREATER THAN 5 10/26/2020 MD RUCHI Peoples SPECIAL PROCEDURES    LITHOTRIPSY  2013    NASAL SEPTUM SURGERY      OTHER SURGICAL HISTORY Left 04/03/2019    foot debridement and eppifix application    SKIN GRAFT Left 4/3/2019    THERASKIN AND EPIFIX APPLICATION  AND  LEFT FOOT WOUND DEBRIDEMENT AND WOUND PREP performed by Bozena Grey DPM at [facility]. Left 4/18/2019    LEFT FOOT APPLICATION APPLIGRAFT performed by Bozena Grey DPM at [facility]. Left 0/0/8565    GRAFT APPLICATION LEFT FOOT - WCB performed by Bozena Grey DPM at [address] [place] [address] Left 1/2016    foot    TOE AMPUTATION Left 10/21/2020    LEFT PARTIAL FIRST RAY  AMPUTATION performed by Jonathan Hughes DPM at [address] [place] [address]       Immunization History:   Immunization History   Administered Date(s) Administered    Influenza Vaccine, unspecified formulation 10/01/2011, 11/01/2012, 11/01/2013    Influenza Virus Vaccine 10/01/2011, 11/01/2012, 11/01/2013, 11/03/2014, 10/30/2015, 09/17/2020    Influenza, High Dose (Fluzone 65 yrs and older) 11/03/2017    Influenza, MDCK Quadv, with preserv IM (Flucelvax 4 yrs and older) 10/16/2018, 11/21/2019    Influenza, Harlen Kilts, IM, (6 mo and older Fluzone, Flulaval, Fluarix and 3 yrs and older Afluria) 11/03/2014, 10/30/2015, 10/16/2018    Influenza, Harlen Kilts, IM, PF (6 mo and older Fluzone, Flulaval, Fluarix, and 3 yrs and older Afluria) 11/03/2014, 10/30/2015, 09/17/2020    Pneumococcal Conjugate 13-valent (Rhywedn43) 11/03/2017    Pneumococcal Polysaccharide ([id number]) 11/01/2011       Active Problems:  Patient Active Problem List   Diagnosis Code    Diabetes mellitus type 2 in obese ([facility] [address] E11.69, E66.9    Chronic ulcer of left foot with fat layer exposed ([address] L97.522    Type 2 diabetes mellitus with hyperglycemia, with long-term current use of insulin ([address] E11.65, Z79.4    Ulcer of toe of left foot ([facility]) L97.529    Diabetic autonomic neuropathy associated with type 2 diabetes mellitus (HCC) E11.43    Type 2 diabetes mellitus with diabetic peripheral angiopathy without gangrene ([facility]) E11.51    Diabetic foot infection ([facility]) E11.628, L08.9    Obstructive sleep apnea G47.33    Class 3 severe obesity due to excess calories with serious comorbidity and body mass index (BMI) of 40.0 to 44.9 in adult ([facility] E66.01, Z68.41    High anion gap metabolic acidosis Y45.1    CKD (chronic kidney disease) stage 3, GFR 30-59 ml/min N18.30    Diabetes mellitus due to underlying condition with diabetic neuropathy, without long-term current use of insulin ([facility]) E08.40    Abscess of foot L02.619    Bacterial infection due to Morganella morganii A49.8    Non-healing ulcer of foot, left, with unspecified severity ([facility] L97.529    Osteomyelitis ([facility]) M86.9       Isolation/Infection:   Isolation            No Isolation          Patient Infection Status       Infection Onset Added Last Indicated Last Indicated By Review Planned Expiration Resolved Resolved By    None active    Resolved    COVID-19 Rule Out 11/30/20 11/30/20 11/30/20 Covid-19 Ambulatory (Ordered)   12/03/20 Rule-Out Test Resulted            Nurse Assessment:  Last Vital Signs: /71   Pulse 72   Temp 98.8 °F (37.1 °C) (Oral)   Resp 16   Ht 6' 4\" (1.93 m)   Wt (!) 356 lb (161.5 kg)   SpO2 96%   BMI 43.33 kg/m²     Last documented pain score (0-10 scale): Pain Level: 5  Last Weight:   Wt Readings from Last 1 Encounters:   12/03/20 (!) 356 lb (161.5 kg)     Mental Status:  {IP PT MENTAL STATUS:20030:::0}    IV Access:  { YELENA IV ACCESS:[id number]:::0}    Nursing Mobility/ADLs:  Walking   {CHP DME ADLs:713388938:::0}  Transfer  {CHP DME ADLs:119010806:::0}  Bathing  {CHP DME ADLs:038796839:::0}  Dressing  {CHP DME ADLs:020116176:::0}  Toileting  {CHP DME ADLs:886123471:::0}  Feeding  {CHP DME ADLs:102998136:::0}  Med Admin Address:  Phone:  Fax:    Dialysis Facility (if applicable)   Name:  Address:  Dialysis Schedule:  Phone:  Fax:    / signature: {Esignature:[id number]:::0}    PHYSICIAN SECTION    Prognosis: Fair    Condition at Discharge: Stable    Rehab Potential (if transferring to Rehab): Fair    Recommended Labs or Other Treatments After Discharge:     Physician Certification: I certify the above information and transfer of Maribel Colon  is necessary for the continuing treatment of the diagnosis listed and that he requires [address] for greater 30 days.      Update Admission H&P: No change in H&P    PHYSICIAN SIGNATURE:  {Esignature:[id number]:::0}

## 2020-12-04 NOTE — PROGRESS NOTES
Physical Therapy    Facility/Department: [facility] MED SURG  Initial Assessment    NAME: Shoaib Garcia  : 1960  MRN: [id number]    Date of Service: 2020    Discharge Recommendations:  Home with assist PRN       PROCEDURE 12/3:   1. Complete resection of first metatarsal, left foot  2. Wound debridement of chronic nonhealing wound sub-first metatarsal, left foot    RN reports patient is medically stable for therapy treatment this date. Chart reviewed prior to treatment and patient is agreeable for therapy. All lines intact and patient positioned comfortably at end of treatment. All patient needs addressed prior to ending therapy session. Assessment   Body structures, Functions, Activity limitations: Decreased functional mobility ; Decreased balance;Decreased safe awareness; Increased pain  Assessment: Pt has order for NWB on LLE, although not compliant throughout session stating \"I am able to put weight through my heel. \". Pt impulsive with mobility and not receptive to education. Recommending home with assist.  Prognosis: Good  Decision Making: Medium Complexity  Clinical Presentation: evolving  PT Education: Goals;Transfer Training;Equipment;PT Role;Energy Conservation; Functional Mobility Training;Plan of Care;General Safety;Weight-bearing Education;Gait Training;Precautions  Patient Education: Emphasis on NWB  REQUIRES PT FOLLOW UP: Yes  Activity Tolerance  Activity Tolerance: Other  Activity Tolerance: Pt not receptive to any education or instructions, increasing fall risk and decreasing healing time to L foot. Patient Diagnosis(es): The encounter diagnosis was Acute post-operative pain. has a past medical history of Chronic kidney disease, Diabetes mellitus ([address] Diabetic neuropathy ([facility] [address] History of kidney stones, and Sleep apnea. has a past surgical history that includes Lithotripsy (); Appendectomy; Nasal septum surgery;  Foot Debridement (Left, 2016); skin split graft (Left, 1/2016); Foot surgery (Left, 02/19/2019); Foot Debridement (Left, 2/19/2019); Foot surgery (Left, 2/22/2019); Foot surgery (Left, 03/12/2019); Foot Debridement (Left, 3/12/2019); other surgical history (Left, 04/03/2019); Skin graft (Left, 4/3/2019); Foot Debridement (Left, 04/18/2019); Skin graft (Left, 4/18/2019); Skin graft (Left, 5/7/2019); Toe amputation (Left, 10/21/2020); IR INSERT PICC VAD W SQ PORT >5 YEARS (10/26/2020); and arthroplasty (Left, 12/3/2020). Restrictions  Restrictions/Precautions  Restrictions/Precautions: Weight Bearing, Fall Risk, Up as Tolerated  Lower Extremity Weight Bearing Restrictions  Right Lower Extremity Weight Bearing: Non Weight Bearing(sx shoe on LLE)  Upper Extremity Weight Bearing Restrictions  Other: Despite strict NWB status, pt is non-compliant stating he only puts pressure on his heel. Throughout the session, pt kept putting weight through LLE even with MAX cues.   Position Activity Restriction  Other position/activity restrictions: PICC line R UE, NWB LLE  Vision/Hearing  Vision: Within Functional Limits  Vision Exceptions: Wears glasses for reading  Hearing: Within functional limits     Subjective  General  Chart Reviewed: Yes  Patient assessed for rehabilitation services?: Yes  Response To Previous Treatment: Not applicable  Family / Caregiver Present: No  Subjective  Subjective: States pain is 5/10  Pain Screening  Patient Currently in Pain: Yes          Orientation  Orientation  Overall Orientation Status: Within Normal Limits  Social/Functional History  Social/Functional History  Lives With: Alone  Type of Home: House  Home Layout: One level  Home Access: Stairs to enter with rails  Entrance Stairs - Number of Steps: 2  Entrance Stairs - Rails: Both  Bathroom Shower/Tub: Walk-in shower(pt has been sponge bathing)  Bathroom Toilet: Handicap height(window sill nearby for support)  Bathroom Equipment: Grab bars in shower  Home Equipment: [employer], to sitting on EOB, declining to sit in chair or get back into supine position in bed. Transfers  Sit to Stand: Stand by assistance  Stand to sit: Stand by assistance  Comment: Pt very impulsive with mobility, non-compliant with WB status even with max cues. Ambulation  Ambulation?: Yes  WB Status: NWB LLE  Ambulation 1  Surface: level tile  Device: Axillary Crutches(knee scooter)  Assistance: Minimal assistance  Quality of Gait: Attempted hopping to maintain WB status, although pt unable to complete without foot entirely off ground. MAX cues for safety and use of crutches, pt not receptive to education. Distance: 4 hops  Comments: Pt attempted hopping with crutches, unable to maintain WB status. Educated on use of RW, pre declining. Pt then used knee scooter ~150ft. Pt very fast on scooter requiring max cues for safety and to slow down especially during turns. Stairs/Curb  Stairs?: Yes  Stairs  # Steps : 2  Stairs Height: 6\"  Rails: Bilateral  Device: No Device(bilateral hand rails)  Assistance: Moderate assistance  Comment: Pt instructed to hop onto each step in order to maintain WB status. Pt unable to complete with NWB on LLE. Max cues required for safety and technique. Balance  Posture: Good  Sitting - Static: Good  Sitting - Dynamic: Good  Standing - Static: Fair;-  Standing - Dynamic: Poor(BUE support from crutches.  Unable to maintain NWB status)        Plan   Plan  Times per week: 1-2 x day / 5-6 days per week  Current Treatment Recommendations: Strengthening, Transfer Training, Endurance Training, Balance Training, Gait Training, Home Exercise Program, Functional Mobility Training, Stair training, Safety Education & Training, Positioning  Safety Devices  Type of devices: Call light within reach, Gait belt, Nurse notified, Left in bed      OutComes Score  AM-PAC Score  AM-PAC Inpatient Mobility Raw Score : 19 (12/04/20 1126)  AM-PAC Inpatient T-Scale Score : 45.44 (12/04/20 1126)  Mobility Inpatient CMS 0-100% Score: 41.77 (12/04/20 1126)  Mobility Inpatient CMS G-Code Modifier : CK (12/04/20 1126)          Goals  Short term goals  Time Frame for Short term goals: 12 visits  Short term goal 1: Pt will perform bed mobility indep  Short term goal 2: Pt will transfer indep while maintaining WB status  Short term goal 3: Pt will ambulate/hop 10ft while maintaining WB status with crutches  Short term goal 4: Pt will tolerate 25mins of PT including gait training, therex, and theract. Patient Goals   Patient goals :  To go home       Therapy Time   Individual Concurrent Group Co-treatment   Time In 0827         Time Out 3286         Minutes 30          tx time: 10mins, split with OT       Jan Perry, PT

## 2020-12-04 NOTE — PROGRESS NOTES
Progress Note  Podiatric Medicine and Surgery     Subjective     CC: Status post complete first metatarsal resection, left foot (DOS: 12/3/2020)    Patient seen and examined at bedside. Pain under control   Afebrile, vital signs stable     HPI :  Kingston Ventura is a 63 y.o. male seen at [facility] for surgical resection of first metatarsal left foot with application of drain. Patient is well-known to Itz Tidwell in the podiatry service. Patient has a history of chronic nonhealing diabetic wound on the bottom of his left foot. Patient was recently admitted for similar issue. At his last admission, patient underwent a left hallux amputation with bone biopsy of the first metatarsal bone. Repeat MRI showed osteomyelitis within the first metatarsal.  Patient currently is receiving Unasyn via PICC line. Patient followed up with infectious disease earlier this week. Patient is a type II diabetic with neuropathy, a recent A1c of 11.6 as of 10/28/2020. Denies any other pedal complaints. PCP is Lanora Favre, MD    ROS: Denies N/V/F/C/SOB/CP/Cough. Otherwise negative except at stated in the HPI.      Medications:  Scheduled Meds:   allopurinol  300 mg Oral Daily    magnesium oxide  200 mg Oral Daily    melatonin  6 mg Oral Nightly    LORazepam  0.5 mg Oral Nightly    sodium chloride flush  10 mL Intravenous 2 times per day    heparin (porcine)  5,000 Units Subcutaneous 3 times per day    ampicillin-sulbactam  1.5 g Intravenous Q8H    insulin lispro  0-18 Units Subcutaneous TID WC    insulin lispro  0-9 Units Subcutaneous Nightly       Continuous Infusions:   dextrose         PRN Meds:gabapentin, sodium chloride flush, acetaminophen **OR** acetaminophen, polyethylene glycol, promethazine **OR** ondansetron, oxyCODONE-acetaminophen **OR** oxyCODONE-acetaminophen, glucose, dextrose, glucagon (rDNA), dextrose, fluticasone    Objective     Vitals:  Patient Vitals for the past 8 hrs:   BP Temp surgical site. Negative pain on calf squeeze. All compartments are soft and compressible.     Dermatologic: Skin is warm and dry and well coapted with sutures intact on the dorsal and plantar aspect of the left foot. Drain is intact with sanguinous drainage noted. No erythema, malodor, increase in warmth, fluctuance, crepitus, or induration noted. Clinical Images:  None    Imaging:   XR FOOT LEFT (MIN 3 VIEWS)   Final Result   Interval amputation of the great toe. No evidence of complication. No   evidence of osteomyelitis. Cultures:   Tissue from toe 12/3/2020: Pending     Assessment   Karli Curran is a 63 y.o. male with     1. Status post left first ray amputation and wound debridement (DOS: 12/3/2020) for osteomyelitis   2. Status post left hallux amputation with tibial and fibular sesamoidectomy (DOS: 10/21/2020)  3. CKD  4. Type 2 diabetes with peripheral neuropathy    Principal Problem:    Osteomyelitis (HCC)  Active Problems:    Type 2 diabetes mellitus with hyperglycemia, with long-term current use of insulin (HCC)    Obstructive sleep apnea    Class 3 severe obesity due to excess calories with serious comorbidity and body mass index (BMI) of 40.0 to 44.9 in [facility])    CKD (chronic kidney disease) stage 3, GFR 30-59 ml/min  Resolved Problems:    Acute post-operative pain       Plan     · Patient examined and evaluated at bedside   · Treatment options discussed in detail with the patient  · Postoperative x-rays reviewed: no evidence of soft tissue emphysema or osteomyelitis. · Medical management per internal medicine. From IM standpoint patient is cleared for discharge. · Antibiotics: Unasyn, ID on board. Recommending 2 weeks of antibiotics. Script for discharge meds in med rec. · Removed drain from surgical site without complication. Dressings were left intact, no obvious signs of active bleeding.    · Strict nonweightbearing to the left foot in a surgical shoe  · Ok to discharge home--pain is controlled with Percocet, no sigificant bleeding or drainage noted. Patient able to ambulate without assistance. · Patient to follow up with Dr. Cecilia King on Dec. 9th or 10th in her office. · Discussed with Dr. Cecilia King.     DVT ppx: Heparin  Diet: carb control   Activity: Nonweightbearing to Left lower extremity  Pain Control: panel ordered      Helen Taylor DPM   Podiatric Medicine & Surgery   12/4/2020 at 7:27 AM     Senior Resident Statement:  I have discussed the case, including pertinent history and exam findings with the resident. I agree with the assessment, plan and orders as documented by the intern. Any changes were made in the note above.        Electronically signed by Johanna Lara DPM on 12/4/2020 at 11:14 AM

## 2020-12-04 NOTE — CONSULTS
postoperative nausea, vomiting, chest pain or shortness of breath. He has past medical history that includes RAÚL with BiPAP use, diabetes and CKD. He follows with Dr. Sampson Stafford with infectious disease.      Past Medical History:     Past Medical History:   Diagnosis Date    Chronic kidney disease     Diabetes mellitus ([facility] [address]     Diabetic neuropathy ([facility] [address]     History of kidney stones     Sleep apnea     uses Bi-Pap nightly        Past Surgical History:     Past Surgical History:   Procedure Laterality Date    APPENDECTOMY      FOOT DEBRIDEMENT Left 01/18/2016    FOOT DEBRIDEMENT Left 2/19/2019    LEFT FOOT   INCISION AND DRAINAGE WITH WOUND VAC APPLICATION performed by Aundrea Hoffmann DPM at [address] Left 3/12/2019    LEFT FOOT WOUND DEBRIDEMENT WITH Con [place]ia performed by Aundrea Hoffmann DPM at [address] Left 16/31/8825    Application of 44 units of Apligraf biologic graft, left foot    FOOT SURGERY Left 02/19/2019    LEFT FOOT   INCISION AND DRAINAGE WITH WOUND VAC APPLICATION (Left )    FOOT SURGERY Left 2/22/2019    DELAYED PRIMARY CLOSURE LEFT FOOT      PULSEVAC performed by Aundrea Hoffmann DPM at [address] Left 03/12/2019    wound debridement    IR INS PICC VAD W SQ PORT GREATER THAN 5  10/26/2020    IR INS PICC VAD W SQ PORT GREATER THAN 5 10/26/2020 Kesha Campbell MD [facility]    LITHOTRIPSY  2013    NASAL SEPTUM SURGERY      OTHER SURGICAL HISTORY Left 04/03/2019    foot debridement and eppifix application    SKIN GRAFT Left 4/3/2019    THERASKIN AND EPIFIX APPLICATION  AND  LEFT FOOT WOUND DEBRIDEMENT AND WOUND PREP performed by Aundrea Hoffmann DPM at [address] [place] [address] Left 4/18/2019    LEFT FOOT APPLICATION APPLIGRAFT performed by Aundrea Hoffmann DPM at [address] [place] [address] Left 1/9/5440    GRAFT APPLICATION LEFT FOOT - WCB performed by Aundrea Hoffmann DPM at [facility] Left 1/2016    foot    TOE AMPUTATION Left 10/21/2020    LEFT PARTIAL FIRST RAY  AMPUTATION performed by Abdulaziz Reese DPM at [facility]        Medications Prior to Admission:     Prior to Admission medications    Medication Sig Start Date End Date Taking? Authorizing Provider   oxyCODONE-acetaminophen (PERCOCET) 5-325 MG per tablet Take 1 tablet by mouth every 6 hours as needed for Pain for up to 7 days. Intended supply: 7 days.  Take lowest dose possible to manage pain 12/3/20 12/10/20 Yes Brandi Owens DPM   insulin glargine (LANTUS) 100 UNIT/ML injection vial Inject 40 Units into the skin 2 times daily 10/28/20  Yes LIBBY Ordonez NP   metFORMIN (GLUCOPHAGE) 500 MG tablet Take 1 tablet by mouth 2 times daily (with meals) 10/28/20  Yes LIBBY Ordonez NP   melatonin 5 MG TABS tablet Take 5-10 mg by mouth nightly    Yes Historical Provider, MD   fluticasone (FLONASE) 50 MCG/ACT nasal spray 1-2 sprays by Each Nostril route daily as needed for Allergies    Yes Historical Provider, MD   Multiple Vitamins-Minerals (THERAPEUTIC MULTIVITAMIN-MINERALS) tablet Take 1 tablet by mouth daily   Yes Historical Provider, MD   magnesium (MAGNESIUM-OXIDE) 250 MG TABS tablet Take 250 mg by mouth daily   Yes Historical Provider, MD   allopurinol (ZYLOPRIM) 300 MG tablet Take 300 mg by mouth daily   Yes Historical Provider, MD   zaleplon (SONATA) 10 MG capsule Take 10 mg by mouth nightly   Yes Historical Provider, MD   diclofenac (VOLTAREN) 75 MG EC tablet Take 75 mg by mouth 2 times daily    Historical Provider, MD   Liniments (BLUE-EMU SUPER STRENGTH) CREA Apply topically as needed (to hands)    Historical Provider, MD   diclofenac sodium (VOLTAREN) 1 % GEL Apply 2 g topically 4 times daily as needed for Pain    Historical Provider, MD   Handicap Placard MISC by Does not apply route Dispense one temporary handicap placard for six months for ease of transportation 2/23/19   Seb Richardson DPM   gabapentin (NEURONTIN) 300 MG capsule Take 300 mg by mouth 2 times daily as needed (nerve pain). Historical Provider, MD        Allergies:     Patient has no known allergies. Social History:     Tobacco:    reports that he has never smoked. He has never used smokeless tobacco.  Alcohol:      reports current alcohol use. Drug Use:  reports no history of drug use. Family History:     Family History   Problem Relation Age of Onset    Heart Disease Mother     Heart Disease Father        Review of Systems:     Positive and Negative as described in HPI. Review of Systems   Constitutional: Negative for chills, diaphoresis and fever. HENT: Negative for congestion. Eyes: Negative for visual disturbance. Respiratory: Negative for cough, chest tightness, shortness of breath and wheezing. Cardiovascular: Negative for chest pain, palpitations and leg swelling. Gastrointestinal: Negative for abdominal pain, blood in stool, constipation, diarrhea, nausea and vomiting. Genitourinary: Negative for difficulty urinating. Musculoskeletal: Positive for arthralgias and myalgias. Skin: Positive for color change and wound. Neurological: Positive for numbness. Negative for dizziness, weakness, light-headedness and headaches. Chronic neuropathy to bilateral feet. All other systems reviewed and are negative. Physical Exam:     /63   Pulse 72   Temp 98.5 °F (36.9 °C) (Oral)   Resp 16   Ht 6' 4\" (1.93 m)   Wt (!) 356 lb (161.5 kg)   SpO2 92%   BMI 43.33 kg/m²   Temp (24hrs), Av.5 °F (36.4 °C), Min:96 °F (35.6 °C), Max:98.5 °F (36.9 °C)    Recent Labs     20  1138 20  1246 20  1458   POCGLU 290* 273* 207*       Intake/Output Summary (Last 24 hours) at 12/3/2020 2220  Last data filed at 12/3/2020 2211  Gross per 24 hour   Intake 1528 ml   Output 1125 ml   Net 403 ml       Physical Exam  Vitals signs and nursing note reviewed. Constitutional:       General: He is not in acute distress. Appearance: He is obese. He is not diaphoretic. Comments: PICC line right upper arm. HENT:      Head: Normocephalic and atraumatic. Right Ear: Hearing normal.      Left Ear: Hearing normal.      Nose: Nose normal. No rhinorrhea. Eyes:      General: Lids are normal.      Extraocular Movements:      Right eye: Normal extraocular motion. Left eye: Normal extraocular motion. Conjunctiva/sclera: Conjunctivae normal.      Right eye: Right conjunctiva is not injected. Left eye: Left conjunctiva is not injected. Pupils: Pupils are equal, round, and reactive to light. Pupils are equal.      Right eye: Pupil is reactive. Left eye: Pupil is reactive. Neck:      Musculoskeletal: Neck supple. Thyroid: No thyromegaly. Vascular: No carotid bruit. Trachea: Trachea and phonation normal. No tracheal deviation. Cardiovascular:      Rate and Rhythm: Normal rate and regular rhythm. Pulses: Normal pulses. Heart sounds: Normal heart sounds. No murmur. Pulmonary:      Effort: Pulmonary effort is normal. No respiratory distress. Breath sounds: No stridor. Examination of the right-lower field reveals decreased breath sounds. Examination of the left-lower field reveals decreased breath sounds. Decreased breath sounds present. Abdominal:      General: Bowel sounds are normal. There is no distension. Palpations: Abdomen is soft. There is no mass. Tenderness: There is no abdominal tenderness. There is no guarding. Musculoskeletal:         General: No tenderness. Feet:      Comments: Post surgical dressing and ACE wrap to left foot clean, dry and intact. x1 LATASHA drain with sanguineous drainage. Skin:     General: Skin is warm and dry. Findings: No erythema, lesion or rash. Neurological:      Mental Status: He is alert and oriented to person, place, and time. He is not disoriented. Cranial Nerves: No cranial nerve deficit.    Psychiatric:         Speech: Speech normal. Behavior: Behavior normal. Behavior is cooperative. Investigations:      Laboratory Testing:  Recent Results (from the past 24 hour(s))   POC Glucose Fingerstick    Collection Time: 12/03/20 11:38 AM   Result Value Ref Range    POC Glucose 290 (H) 75 - 110 mg/dL   POC Glucose Fingerstick    Collection Time: 12/03/20 12:46 PM   Result Value Ref Range    POC Glucose 273 (H) 75 - 110 mg/dL   Culture, Anaerobic and Aerobic    Collection Time: 12/03/20  1:47 PM    Specimen: Toe; Tissue   Result Value Ref Range    Specimen Description . TOE LEFT FIRST     Special Requests NOT REPORTED     Direct Exam NO NEUTROPHILS SEEN     Direct Exam NO BACTERIA SEEN     Culture PENDING    POC Glucose Fingerstick    Collection Time: 12/03/20  2:58 PM   Result Value Ref Range    POC Glucose 207 (H) 75 - 110 mg/dL   Basic Metabolic Panel w/ Reflex to MG    Collection Time: 12/03/20  5:00 PM   Result Value Ref Range    Glucose 255 (H) 70 - 99 mg/dL    BUN 24 (H) 8 - 23 mg/dL    CREATININE 1.29 (H) 0.70 - 1.20 mg/dL    Bun/Cre Ratio 19 9 - 20    Calcium 9.7 8.6 - 10.4 mg/dL    Sodium 137 135 - 144 mmol/L    Potassium 4.4 3.7 - 5.3 mmol/L    Chloride 101 98 - 107 mmol/L    CO2 26 20 - 31 mmol/L    Anion Gap 10 9 - 17 mmol/L    GFR Non-African American 57 (L) >60 mL/min    GFR African American >60 >60 mL/min    GFR Comment          GFR Staging NOT REPORTED    CBC auto differential    Collection Time: 12/03/20  5:00 PM   Result Value Ref Range    WBC 7.2 3.5 - 11.3 k/uL    RBC 4.83 4.21 - 5.77 m/uL    Hemoglobin 14.4 13.0 - 17.0 g/dL    Hematocrit 45.2 40.7 - 50.3 %    MCV 93.6 82.6 - 102.9 fL    MCH 29.8 25.2 - 33.5 pg    MCHC 31.9 28.4 - 34.8 g/dL    RDW 13.0 11.8 - 14.4 %    Platelets 790 231 - 797 k/uL    MPV 8.8 8.1 - 13.5 fL    NRBC Automated 0.0 0.0 per 100 WBC    Differential Type NOT REPORTED     Seg Neutrophils 58 36 - 65 %    Lymphocytes 31 24 - 43 %    Monocytes 8 3 - 12 %    Eosinophils % 2 1 - 4 %    Basophils 1 0 - 2 %    Immature Granulocytes 0 0 %    Segs Absolute 4.16 1.50 - 8.10 k/uL    Absolute Lymph # 2.24 1.10 - 3.70 k/uL    Absolute Mono # 0.59 0.10 - 1.20 k/uL    Absolute Eos # 0.14 0.00 - 0.44 k/uL    Basophils Absolute 0.04 0.00 - 0.20 k/uL    Absolute Immature Granulocyte 0.03 0.00 - 0.30 k/uL    WBC Morphology NOT REPORTED     RBC Morphology NOT REPORTED     Platelet Estimate NOT REPORTED        Imaging/Diagonstics:  Xr Foot Left (min 3 Views)    Result Date: 12/3/2020  Interval amputation of the great toe. No evidence of complication. No evidence of osteomyelitis. Mri Foot Left W Wo Contrast    Result Date: 11/30/2020  1. Acute osteomyelitis of the 1st metatarsal head, neck and shaft. 2. Large plantar ulceration at the level of the 1st metatarsal head with associated abscess measuring approximately 1.6 x 2.2 x 1.2 cm. Associated phlegmonous change and cellulitis at the level of the 1st metatarsal head and webspace. Assessment :      Hospital Problems           Last Modified POA    * (Principal) Osteomyelitis ([facility] [address] 12/3/2020 Yes    Type 2 diabetes mellitus with hyperglycemia, with long-term current use of insulin ([address] 12/3/2020 Yes    Obstructive sleep apnea 12/3/2020 Yes    Class 3 severe obesity due to excess calories with serious comorbidity and body mass index (BMI) of 40.0 to 44.9 in adult [facility]) 12/3/2020 Yes    CKD (chronic kidney disease) stage 3, GFR 30-59 ml/min 12/3/2020 Yes        Plan:     1. Xray left foot reviewed. 2. DM- monitor and control blood glucose. Insulin sliding scale. 3. RAÚL- utilize home bipap. 4. CKD- avoid nephrotoxic agents. 5. Obesity- lifestyle modification. 6. Continue current pain regimen. 7. Supplemental oxygen as needed. 8. Incentive spirometry. 9. Antibiotics per infectious disease. 10. Additional orders per podiatry service. Plan of care discussed with patient and Lily SANFORD.      Consultations:   PHARMACY TO DOSE VANCOMYCIN  IP CONSULT TO HOSPITALIST  IP CONSULT TO INFECTIOUS DISEASES  IP CONSULT TO CASE MANAGEMENT      Helene Marrero, APRN - CNP  12/3/2020  10:20 PM    Copy sent to Dr. Francine Turner MD     (Please note that portions of this note were completed with a voice recognition program. Efforts were made to edit the dictations but occasionally words are mis-transcribed.)

## 2020-12-07 ENCOUNTER — HOSPITAL ENCOUNTER (OUTPATIENT)
Facility: CLINIC | Age: 60
Setting detail: SPECIMEN
Discharge: HOME | End: 2020-12-07
Payer: COMMERCIAL

## 2020-12-07 LAB
ABSOLUTE EOS #: 0.19 K/UL (ref 0–0.44)
ABSOLUTE IMMATURE GRANULOCYTE: <0.03 K/UL (ref 0–0.3)
ABSOLUTE LYMPH #: 1.75 K/UL (ref 1.1–3.7)
ABSOLUTE MONO #: 0.5 K/UL (ref 0.1–1.2)
ANION GAP SERPL CALCULATED.3IONS-SCNC: 20 MMOL/L (ref 9–17)
BASOPHILS # BLD: 1 % (ref 0–2)
BASOPHILS ABSOLUTE: 0.03 K/UL (ref 0–0.2)
BUN BLDV-MCNC: 24 MG/DL (ref 8–23)
BUN/CREAT BLD: ABNORMAL (ref 9–20)
C-REACTIVE PROTEIN: 15.3 MG/L (ref 0–5)
CALCIUM SERPL-MCNC: 10.2 MG/DL (ref 8.6–10.4)
CHLORIDE BLD-SCNC: 104 MMOL/L (ref 98–107)
CO2: 21 MMOL/L (ref 20–31)
CREAT SERPL-MCNC: 1.2 MG/DL (ref 0.7–1.2)
DIFFERENTIAL TYPE: NORMAL
EOSINOPHILS RELATIVE PERCENT: 3 % (ref 1–4)
GFR AFRICAN AMERICAN: >60 ML/MIN
GFR NON-AFRICAN AMERICAN: >60 ML/MIN
GFR SERPL CREATININE-BSD FRML MDRD: ABNORMAL ML/MIN/{1.73_M2}
GFR SERPL CREATININE-BSD FRML MDRD: ABNORMAL ML/MIN/{1.73_M2}
GLUCOSE BLD-MCNC: 300 MG/DL (ref 70–99)
HCT VFR BLD CALC: 45.8 % (ref 40.7–50.3)
HEMOGLOBIN: 14.7 G/DL (ref 13–17)
IMMATURE GRANULOCYTES: 0 %
LYMPHOCYTES # BLD: 28 % (ref 24–43)
MCH RBC QN AUTO: 29.5 PG (ref 25.2–33.5)
MCHC RBC AUTO-ENTMCNC: 32.1 G/DL (ref 28.4–34.8)
MCV RBC AUTO: 92 FL (ref 82.6–102.9)
MONOCYTES # BLD: 8 % (ref 3–12)
NRBC AUTOMATED: 0 PER 100 WBC
PDW BLD-RTO: 12.9 % (ref 11.8–14.4)
PLATELET # BLD: 233 K/UL (ref 138–453)
PLATELET ESTIMATE: NORMAL
PMV BLD AUTO: 10.1 FL (ref 8.1–13.5)
POTASSIUM SERPL-SCNC: 4.6 MMOL/L (ref 3.7–5.3)
RBC # BLD: 4.98 M/UL (ref 4.21–5.77)
RBC # BLD: NORMAL 10*6/UL
SEG NEUTROPHILS: 60 % (ref 36–65)
SEGMENTED NEUTROPHILS ABSOLUTE COUNT: 3.75 K/UL (ref 1.5–8.1)
SODIUM BLD-SCNC: 145 MMOL/L (ref 135–144)
SURGICAL PATHOLOGY REPORT: NORMAL
WBC # BLD: 6.2 K/UL (ref 3.5–11.3)
WBC # BLD: NORMAL 10*3/UL

## 2020-12-07 PROCEDURE — 86140 C-REACTIVE PROTEIN: CPT

## 2020-12-07 PROCEDURE — 80048 BASIC METABOLIC PNL TOTAL CA: CPT

## 2020-12-07 PROCEDURE — 85025 COMPLETE CBC W/AUTO DIFF WBC: CPT

## 2020-12-08 LAB
CULTURE: ABNORMAL
CULTURE: ABNORMAL
DIRECT EXAM: ABNORMAL
DIRECT EXAM: ABNORMAL
GLUCOSE BLD-MCNC: 232 MG/DL (ref 75–110)
GLUCOSE BLD-MCNC: 241 MG/DL (ref 75–110)
GLUCOSE BLD-MCNC: 340 MG/DL (ref 75–110)
Lab: ABNORMAL
SPECIMEN DESCRIPTION: ABNORMAL

## 2020-12-14 ENCOUNTER — HOSPITAL ENCOUNTER (OUTPATIENT)
Age: 60
Setting detail: SPECIMEN
Discharge: HOME | End: 2020-12-14
Payer: COMMERCIAL

## 2020-12-14 LAB
ABSOLUTE EOS #: 0.19 K/UL (ref 0–0.44)
ABSOLUTE IMMATURE GRANULOCYTE: 0.04 K/UL (ref 0–0.3)
ABSOLUTE LYMPH #: 2.16 K/UL (ref 1.1–3.7)
ABSOLUTE MONO #: 0.57 K/UL (ref 0.1–1.2)
ANION GAP SERPL CALCULATED.3IONS-SCNC: 12 MMOL/L (ref 9–17)
BASOPHILS # BLD: 1 % (ref 0–2)
BASOPHILS ABSOLUTE: 0.05 K/UL (ref 0–0.2)
BUN BLDV-MCNC: 19 MG/DL (ref 8–23)
BUN/CREAT BLD: ABNORMAL (ref 9–20)
C-REACTIVE PROTEIN: 4.9 MG/L (ref 0–5)
CALCIUM SERPL-MCNC: 9.6 MG/DL (ref 8.6–10.4)
CHLORIDE BLD-SCNC: 102 MMOL/L (ref 98–107)
CO2: 24 MMOL/L (ref 20–31)
CREAT SERPL-MCNC: 1.26 MG/DL (ref 0.7–1.2)
DIFFERENTIAL TYPE: ABNORMAL
EOSINOPHILS RELATIVE PERCENT: 3 % (ref 1–4)
GFR AFRICAN AMERICAN: >60 ML/MIN
GFR NON-AFRICAN AMERICAN: 58 ML/MIN
GFR SERPL CREATININE-BSD FRML MDRD: ABNORMAL ML/MIN/{1.73_M2}
GFR SERPL CREATININE-BSD FRML MDRD: ABNORMAL ML/MIN/{1.73_M2}
GLUCOSE BLD-MCNC: 272 MG/DL (ref 70–99)
HCT VFR BLD CALC: 47.4 % (ref 40.7–50.3)
HEMOGLOBIN: 15 G/DL (ref 13–17)
IMMATURE GRANULOCYTES: 1 %
LYMPHOCYTES # BLD: 34 % (ref 24–43)
MCH RBC QN AUTO: 29.1 PG (ref 25.2–33.5)
MCHC RBC AUTO-ENTMCNC: 31.6 G/DL (ref 28.4–34.8)
MCV RBC AUTO: 92 FL (ref 82.6–102.9)
MONOCYTES # BLD: 9 % (ref 3–12)
NRBC AUTOMATED: 0 PER 100 WBC
PDW BLD-RTO: 13 % (ref 11.8–14.4)
PLATELET # BLD: 247 K/UL (ref 138–453)
PLATELET ESTIMATE: ABNORMAL
PMV BLD AUTO: 10.1 FL (ref 8.1–13.5)
POTASSIUM SERPL-SCNC: 4.2 MMOL/L (ref 3.7–5.3)
RBC # BLD: 5.15 M/UL (ref 4.21–5.77)
RBC # BLD: ABNORMAL 10*6/UL
SEG NEUTROPHILS: 52 % (ref 36–65)
SEGMENTED NEUTROPHILS ABSOLUTE COUNT: 3.33 K/UL (ref 1.5–8.1)
SODIUM BLD-SCNC: 138 MMOL/L (ref 135–144)
WBC # BLD: 6.3 K/UL (ref 3.5–11.3)
WBC # BLD: ABNORMAL 10*3/UL

## 2020-12-15 ENCOUNTER — TELEPHONE (OUTPATIENT)
Dept: INFECTIOUS DISEASES | Age: 60
End: 2020-12-15

## 2020-12-15 NOTE — TELEPHONE ENCOUNTER
Patient called, states he has orders to have his PICC line removed on Friday. He wonders if you want to see him in the office and/or put him on an oral antibiotic?

## 2020-12-15 NOTE — TELEPHONE ENCOUNTER
The patient will likely need a follow-up appointments to determine whether he needs further oral antibiotic therapy.   I do not think that he should need some but it is hard to say without evaluating his foot

## 2020-12-17 ENCOUNTER — TELEPHONE (OUTPATIENT)
Dept: INFECTIOUS DISEASES | Age: 60
End: 2020-12-17

## 2020-12-17 NOTE — TELEPHONE ENCOUNTER
Aretha Rios MD 2020 2:16 PM Andrew Olvera, : 1960. Home care called with an update on his foot. He saw Dr Mo Fox who said the foot looks good and doesn't feel like an antibiotic is needed at this time. The foot had some redness and the patient was walking on it, but the redness has since dissolved probably due to a tight dressing. Pt has a PICC line, can it be pulled after last dose tomorrow? Amber Lewis Read 2020 2:18 PM 2020 2:19 PM Patient also has a follow up appt with you in January Read 2020 2:19 PM 2020 2:19 PM Catracho Ruiz notified.  Madiha Julien

## 2020-12-17 NOTE — TELEPHONE ENCOUNTER
Home care called asking for an order to pull the PICC line, however she states that on Monday the foot was red because the patient keeps walking on it. Patient didn't report that to me yesterday. Patient had an appt w/Dr Mary Tamez yesterday and she is trying to give and get an update from that visit on the foot.  She will try calling again today, she will then call me back and I will update Dr Farheen Pena and try to get an answer regarding PICC line removal.

## 2020-12-22 ENCOUNTER — HOSPITAL ENCOUNTER (OUTPATIENT)
Dept: PHARMACY | Age: 60
Setting detail: THERAPIES SERIES
Discharge: HOME | End: 2020-12-22
Payer: COMMERCIAL

## 2020-12-22 VITALS — SYSTOLIC BLOOD PRESSURE: 118 MMHG | HEART RATE: 70 BPM | DIASTOLIC BLOOD PRESSURE: 71 MMHG

## 2020-12-22 PROCEDURE — 99211 OFF/OP EST MAY X REQ PHY/QHP: CPT

## 2020-12-22 RX ORDER — OXYCODONE AND ACETAMINOPHEN 10; 325 MG/1; MG/1
1 TABLET ORAL EVERY 4 HOURS PRN
COMMUNITY

## 2020-12-22 NOTE — PROGRESS NOTES
Diabetic Medication Management Program  [address]. [address]  Phone: [phone]  Fax: [phone]    NAME: [facility] RECORD NUMBER:  [id number]  AGE: 63 y.o. GENDER: male  : 1960  EPISODE DATE:  2020       Mr. Kimo Cobb was referred to [facility] by Marilu Kelly NP after hospitalization at [facility] for diabetic foot infection. Patient acknowledges working in consult agreement with clinical pharmacist and this provider. Goals per referral:   Fasting blood glucose: < 130  Peak postprandial glucose: < 180  A1C: < 7    Patient Specific Goals:  1. Achieve blood glucose control  2. Resume exercise once healed from foot surgery  3. Follow healthy diet    SUBJECTIVE     Mr. Kimo Cobb is a 63 y.o. male here for the Diabetes Service for self-management education, medication review including over the counter medications and herbal products, overall wellbeing assessment, transition of care and any needed adjustments with updates and recommendations communicated to the referring physician.       Patient Findings: Patient has been working to lower carbohydrate intake since recent hospitalization at [facility] due to elevated A1c  []  Missed doses   []  Emergency Room Visit or Hospitalization    []  Medication changes   []  Diet changes   []  Acute illness   []  Activity changes      Symptoms of hypoglycemia   [x]  None    []  Shakiness    []  Lightheadedness or dizziness   []  Confusion      []  Sweating   []  Other       Symptoms of hyperglycemia   [x]  None   []  Frequent urination    []  Increased thirst   []  Other    Medication adverse reactions   [x]  None   []  Diarrhea / Nausea / Vomiting / Constipation / flatulence   []  Hypertension   []  Peripheral edema     []  Signs of infection   []  Headache   []  Vision changes   []  Increased cholesterol    []  Weight gain   []  Change in renal function []  Increased potassium  []  Other      Comments:        Recent hospital admission/ED visit related to diabetes? Yes - admitted due to diabetic foot infection related to uncontrolled T2DM . Discharge date 10/29/20    OBJECTIVE     PMHx:    Past Medical History:   Diagnosis Date    Chronic kidney disease     [facility] mellitus [facility]     Diabetic neuropathy ([facility] [address] [facility]     History of kidney stones     Sleep apnea     uses Bi-Pap nightly       Social History:    Social History     Tobacco Use    Smoking status: Never Smoker    Smokeless tobacco: Never Used   Substance Use Topics    Alcohol use: Yes     Alcohol/week: 0.0 standard drinks     Comment: occasional       Pertinent Labs:    Recent A1c: 11.6 on 10/28/20    Lab Results   Component Value Date    LABA1C 11.6 (H) 10/28/2020    LABA1C 8.8 (H) 10/26/2015     No results found for: CHOL, TRIG, HDL, LDLCALC  Lab Results   Component Value Date    CREATININE 1.26 (H) 12/14/2020    BUN 19 12/14/2020     12/14/2020    K 4.2 12/14/2020     12/14/2020    CO2 24 12/14/2020     Lab Results   Component Value Date    ALT 39 01/13/2016       Weight:  Wt Readings from Last 3 Encounters:   12/03/20 (!) 356 lb (161.5 kg)   11/25/20 (!) 350 lb (158.8 kg)   10/29/20 (!) 378 lb (171.5 kg)       Current medications:  Prior to Admission medications    Medication Sig Start Date End Date Taking?  Authorizing Provider   insulin glargine (LANTUS) 100 UNIT/ML injection vial Inject 40 Units into the skin 2 times daily 10/28/20   LIBBY Velazquez NP   metFORMIN (GLUCOPHAGE) 500 MG tablet Take 1 tablet by mouth 2 times daily (with meals) 10/28/20   LIBBY Velazquez NP   Liniments (BLUE-EMU SUPER STRENGTH) CREA Apply topically as needed (to hands)    Historical Provider, MD   Handicap Placard MISC by Does not apply route Dispense one temporary handicap placard for six months for ease of transportation 2/23/19   Candice Lam DPM melatonin 5 MG TABS tablet Take 5-10 mg by mouth nightly     Historical Provider, MD   fluticasone (FLONASE) 50 MCG/ACT nasal spray 1-2 sprays by Each Nostril route daily as needed for Allergies     Historical Provider, MD   Multiple Vitamins-Minerals (THERAPEUTIC MULTIVITAMIN-MINERALS) tablet Take 1 tablet by mouth daily    Historical Provider, MD   magnesium (MAGNESIUM-OXIDE) 250 MG TABS tablet Take 250 mg by mouth daily    Historical Provider, MD   gabapentin (NEURONTIN) 300 MG capsule Take 300 mg by mouth 2 times daily as needed (nerve pain). Historical Provider, MD   allopurinol (ZYLOPRIM) 300 MG tablet Take 300 mg by mouth daily    Historical Provider, MD   zaleplon (SONATA) 10 MG capsule Take 10 mg by mouth nightly    Historical Provider, MD       Immunizations:   Most Recent Immunizations   Administered Date(s) Administered    Influenza Vaccine, unspecified formulation 11/01/2013    Influenza Virus Vaccine 09/17/2020    Influenza, High Dose (Fluzone 65 yrs and older) 11/03/2017    Influenza, MDCK Quadv, with preserv IM (Flucelvax 4 yrs and older) 11/21/2019    Influenza, Quadv, IM, (6 mo and older Fluzone, Flulaval, Fluarix and 3 yrs and older Afluria) 10/16/2018    Influenza, Quadv, IM, PF (6 mo and older Fluzone, Flulaval, Fluarix, and 3 yrs and older Afluria) 09/17/2020    Pneumococcal Conjugate 13-valent ([id number]) 11/03/2017    Pneumococcal Polysaccharide ([id number]) 11/01/2011       Home Blood Glucose Results:   Patient reports fasting BG is typically around 220 when he wakes up in the morning. I checked his BG in office before breakfast and after he took Lantus/metformin. BG was 159. Blood glucose trends noted: Fasting BG above goal of 130 but is down from 300-400 range.  Patient does not currently check postprandial BG but he will start to check BID (once in AM before meal, once 2 hrs after largest meal of the day)    ASSESSMENT Patient has not received formal diabetic education in the past. Read Diabetes for [employer] book    DIABETES MANAGEMENT  Do you currently check your blood sugar levels at home? Yes - once daily in AM before meal    Educated regarding hypo/hyperglycemia and timing of diabetic medications    OTHER MEDICAL HISTORY    Educated on need for regular foot/eye/dental care  Do you perform regular foot care? Yes - checks feet daily     Date of last medical foot exam: unknown    Date of last eye exam: Unknown   Date of last dental exam: Unknown     Immunizations:   Most Recent Immunizations   Administered Date(s) Administered    Influenza Vaccine, unspecified formulation 11/01/2013    Influenza Virus Vaccine 09/17/2020    Influenza, High Dose (Fluzone 65 yrs and older) 11/03/2017    Influenza, MDCK Quadv, with preserv IM (Flucelvax 4 yrs and older) 11/21/2019    Influenza, Quadv, IM, (6 mo and older Fluzone, Flulaval, Fluarix and 3 yrs and older Afluria) 10/16/2018    Influenza, Quadv, IM, PF (6 mo and older Fluzone, Flulaval, Fluarix, and 3 yrs and older Afluria) 09/17/2020    Pneumococcal Conjugate 13-valent ([id number]) 11/03/2017    Pneumococcal Polysaccharide ([id number]) 11/01/2011       I will recommend the following immunizations to patient: Shingrix. Will determine cost through prescription plan. Patient would also like COVID vaccine when available due to being diabetic and a . LIFESTYLE    Do you have a history of tobacco use? Never Used     Do you drink alcohol? Yes - socially     Have you made any changes in your lifestyle within the last year that you feel good about? Yes - lower carb intake/lower portion sizes , subbed regular soda with diet, subbed chips with popcorn. NUTRITION    Are you currently following any type of meal plan or diet (ex: high protein, high fat, low carb, vegan, etc)? Yes - watching carbs.  Reports he eats less than 60 grams of carbs per meal       EXERCISE What type of exercise(s) do you do? [x]  I do not exercise  []  Walking  []  Aerobics  []  Running  []  Swimming  []  Strength training  []  Other: recovering from foot surgery/infection    Will recommend Statin, Aspirin, ACEI to PCP per ADA guidelines    Current Medications Affecting Diabetes:  Metformin 500mg BID, Lantus 40 units BID. Patient was previously on sulfonylurea + metformin combo but this was stopped at the time he was put on IV antibiotics to reduce the stress on kidneys. Compliant: yes  Barriers to medication therapy: no     Recent exacerbations / new problems:  none         PLAN     Provided initial education on diabetic disease state, medications, monitoring blood glucose, diet, and exercise. Initial diabetic education materials given to patient including the following: * Blood Glucose Log  *Living Well With Diabetes              * Using the Plate Method for a Balanced Meal Plan              * Things to Know About Hypoglycemia              *Smart Snacks              *A1c and Average Glucose Chart              *Alcohol and Diabetes        Encouraged patient to check BG before breakfast and 2 hrs after largest meal of the day    Watch carb intake, continue healthier meal options as he has been doing    Will check insurance coverage on Shingrix vaccine and recommend initiation of ACEI, statin and aspirin to PCP per ADA guidelines. Patient may not be able to take aspirin currently due to foot wound still healing.           Physician Follow-up:  As scheduled    Medication Management Follow-up:   Diabetes Service 1/12/21     Electronically signed by Dior Gray [facility] on 12/22/2020 at 10:54 AM    CLINICAL PHARMACY CONSULT: MED RECONCILIATION/REVIEW [place]  22. Tracking Only    PHSO: No  Total # of Interventions Recommended: 0  - Maintenance Safety Lab Monitoring #: 0  Total Interventions Accepted: 0  Time Spent (min): Derrick VelasquezD

## 2021-01-05 DIAGNOSIS — A49.8 BACTERIAL INFECTION DUE TO MORGANELLA MORGANII: Primary | ICD-10-CM

## 2021-01-06 ENCOUNTER — OFFICE VISIT (OUTPATIENT)
Dept: INFECTIOUS DISEASES | Age: 61
End: 2021-01-06
Payer: COMMERCIAL

## 2021-01-06 VITALS
TEMPERATURE: 97 F | HEIGHT: 76 IN | HEART RATE: 79 BPM | RESPIRATION RATE: 18 BRPM | SYSTOLIC BLOOD PRESSURE: 137 MMHG | BODY MASS INDEX: 38.36 KG/M2 | OXYGEN SATURATION: 99 % | DIASTOLIC BLOOD PRESSURE: 79 MMHG | WEIGHT: 315 LBS

## 2021-01-06 DIAGNOSIS — E11.628 DIABETIC FOOT INFECTION (HCC): Primary | ICD-10-CM

## 2021-01-06 DIAGNOSIS — L08.9 DIABETIC FOOT INFECTION (HCC): Primary | ICD-10-CM

## 2021-01-06 DIAGNOSIS — L98.8 DRAINING CUTANEOUS SINUS TRACT: ICD-10-CM

## 2021-01-06 DIAGNOSIS — M86.9 OSTEOMYELITIS OF LEFT FOOT, UNSPECIFIED TYPE (HCC): ICD-10-CM

## 2021-01-06 PROBLEM — A49.8 BACTERIAL INFECTION DUE TO MORGANELLA MORGANII: Status: RESOLVED | Noted: 2019-02-21 | Resolved: 2021-01-06

## 2021-01-06 PROCEDURE — 99213 OFFICE O/P EST LOW 20 MIN: CPT | Performed by: INTERNAL MEDICINE

## 2021-01-06 RX ORDER — SULFAMETHOXAZOLE AND TRIMETHOPRIM 400; 80 MG/1; MG/1
1 TABLET ORAL 2 TIMES DAILY
COMMUNITY
End: 2021-08-06

## 2021-01-06 RX ORDER — CIPROFLOXACIN 250 MG/1
250 TABLET, FILM COATED ORAL 2 TIMES DAILY
Status: ON HOLD | COMMUNITY
End: 2021-08-11

## 2021-01-06 NOTE — PROGRESS NOTES
[facility]  Office Progress Note  Today's Date and Time: 1/6/2021, 11:55 AM    Impression:     1. Diabetic foot infection ([address]    2. Osteomyelitis of left foot, unspecified type ([address]    3. Draining cutaneous sinus tract       Recommendations   · A wound pathogen panel did show Serratia marcescens as well as MRSA  · The concern at this point in time is for residual osteomyelitis associated with the draining sinus tract  · The patient continues on oral antimicrobial therapy with Bactrim and ciprofloxacin  · We will need to continue to monitor his progress to ensure that there is now resolution of the osteomyelitis. I have ordered the following medications/ labs:  No orders of the defined types were placed in this encounter. No orders of the defined types were placed in this encounter. Chief complaint/reason for consultation:     Chief Complaint   Patient presents with    Follow-up        History of Present Illness:   Harjinder Olivares is a 63y.o.-year-old male who has a known history of diabetes mellitus type 2 with associated neuropathy, morbid obesity, chronic kidney disease and left foot plantar ulceration with osteomyelitis for which he underwent great toe amputation. The patient had a persistent subfirst metatarsal wound and the patient subsequently developed osteomyelitis of the first metatarsal of the left foot and the patient underwent surgery and resection of the distal aspect of the first metatarsal bone on 12/3/2020    The last surgery was done at outpatient basis and the patient currently continues on oral antimicrobial therapy with ciprofloxacin and Bactrim. He tells me that he had soft tissue swelling on the dorsal aspect of the foot and subsequently developed a draining subcutaneous tract. He comes in today for further follow-up and he does not report any subjective fevers or chills no cough or shortness of breath no chest pains or palpitations.     I have (GLUCOPHAGE) 500 MG tablet Take 1 tablet by mouth 2 times daily (with meals) 180 tablet 1    Liniments (BLUE-EMU SUPER STRENGTH) CREA Apply topically as needed (to hands)      Handicap Placard MISC by Does not apply route Dispense one temporary handicap placard for six months for ease of transportation 1 each 0    melatonin 5 MG TABS tablet Take 5-10 mg by mouth nightly       fluticasone (FLONASE) 50 MCG/ACT nasal spray 1-2 sprays by Each Nostril route daily as needed (BIPAP)       Multiple Vitamins-Minerals (THERAPEUTIC MULTIVITAMIN-MINERALS) tablet Take 1 tablet by mouth daily      allopurinol (ZYLOPRIM) 300 MG tablet Take 300 mg by mouth daily      zaleplon (SONATA) 10 MG capsule Take 10 mg by mouth nightly       No current facility-administered medications for this visit. Allergies:   Patient has no known allergies. Review of Systems:   Review of Systems   Constitutional: Negative. Respiratory: Negative. Cardiovascular: Negative. Gastrointestinal: Negative. Genitourinary: Negative. Musculoskeletal: Negative. Skin: Positive for color change and wound. Allergic/Immunologic: Negative. Neurological: Negative. Psychiatric/Behavioral: Negative. Physical Examination :   /79   Pulse 79   Temp 97 °F (36.1 °C)   Resp 18   Ht 6' 4\" (1.93 m)   Wt (!) 356 lb (161.5 kg)   SpO2 99%   BMI 43.33 kg/m²     Physical Exam  Constitutional:       Appearance: He is well-developed. He is obese. HENT:      Head: Normocephalic and atraumatic. Neck:      Musculoskeletal: Normal range of motion and neck supple. Cardiovascular:      Rate and Rhythm: Normal rate. Heart sounds: Normal heart sounds. No friction rub. No gallop. Pulmonary:      Effort: Pulmonary effort is normal.      Breath sounds: Normal breath sounds. No wheezing. Abdominal:      General: Bowel sounds are normal.      Palpations: Abdomen is soft. There is no mass. Tenderness:  There is no abdominal tenderness. Musculoskeletal: Normal range of motion. Lymphadenopathy:      Cervical: No cervical adenopathy. Skin:     Comments: The left foot is status post great toe amputation and there is a area of cellulitis on the dorsal aspect of the foot with a draining sinus tract. There is a healing/dry cracked callus on the plantar aspect of the left foot-sub first metatarsal   Neurological:      Mental Status: He is alert and oriented to person, place, and time.                      Laboratory studies :  Medical Decision Making:   I have independently reviewed the following labs:  CBC with Differential:  Lab Results   Component Value Date    WBC 6.3 12/14/2020    WBC 6.2 12/07/2020    HGB 15.0 12/14/2020    HGB 14.7 12/07/2020    HCT 47.4 12/14/2020    HCT 45.8 12/07/2020     12/14/2020     12/07/2020     09/23/2011    LYMPHOPCT 34 12/14/2020    LYMPHOPCT 28 12/07/2020    MONOPCT 9 12/14/2020    MONOPCT 8 12/07/2020       BMP:  Lab Results   Component Value Date     12/14/2020     12/07/2020    K 4.2 12/14/2020    K 4.6 12/07/2020     12/14/2020     12/07/2020    CO2 24 12/14/2020    CO2 21 12/07/2020    BUN 19 12/14/2020    BUN 24 12/07/2020    CREATININE 1.26 12/14/2020    CREATININE 1.20 12/07/2020       Hepatic Function Panel:   Lab Results   Component Value Date    PROT 7.5 10/26/2015    LABALBU 3.8 10/26/2015    ALKPHOS 45 01/13/2016    ALT 39 01/13/2016       Lab Results   Component Value Date    CRP 4.9 12/14/2020     Lab Results   Component Value Date    SEDRATE 86 (H) 10/19/2020           Cultures:   Wound pathogen Panel 12/28/20  Serratia   MRSA/MRSE      TOE LEFT FIRST    Special Requests 12/03/2020  1:47  [facility]   NOT REPORTED    Direct Exam 12/03/2020  1:47  [address]   NO NEUTROPHILS SEEN    Direct Exam 12/03/2020  1:47  [address]   NO BACTERIA SEEN    Culture Abnormal  12/03/2020  1:47 PM [address] ONE COLONY FORMING UNIT    Culture Abnormal  12/03/2020  1:47  [address]   NO ANAEROBIC ORGANISMS ISOLATED AT 5 DAYS          Thank you for allowing us to participate in the care of this patient. Pleasecall with questions. Miller Raygoza MD  [employer] messaging: [phone]    This note is created with the assistance of a speech recognition program.  While intending to generate a document that actually reflects the content ofthe visit, the document can still have some errors including those of syntax and sound a like substitutions which may escape proof reading. It such instances, actual meaning can be extrapolated by contextual diversion.

## 2021-01-12 ENCOUNTER — TELEPHONE (OUTPATIENT)
Dept: PHARMACY | Age: 61
End: 2021-01-12

## 2021-01-12 NOTE — TELEPHONE ENCOUNTER
Patient indicated the need to reschedule Diabetic appointment. per [facility] reminder call service. Called and spoke with patient who states he is going back to work and is unable to reschedule an appt at this time. He will call back when able to reschedule.

## 2021-01-24 ASSESSMENT — ENCOUNTER SYMPTOMS
ALLERGIC/IMMUNOLOGIC NEGATIVE: 1
RESPIRATORY NEGATIVE: 1
COLOR CHANGE: 1
GASTROINTESTINAL NEGATIVE: 1

## 2021-03-25 NOTE — TELEPHONE ENCOUNTER
Called patient to reschedule appointment, patient states he is busy right now and isn't able to schedule an appointment. Will call us back when he is available to set up an appointment.

## 2021-08-06 ENCOUNTER — HOSPITAL ENCOUNTER (OUTPATIENT)
Dept: PREADMISSION TESTING | Age: 61
Discharge: HOME | End: 2021-08-10
Payer: COMMERCIAL

## 2021-08-06 VITALS
WEIGHT: 315 LBS | OXYGEN SATURATION: 98 % | SYSTOLIC BLOOD PRESSURE: 164 MMHG | RESPIRATION RATE: 16 BRPM | HEART RATE: 87 BPM | DIASTOLIC BLOOD PRESSURE: 60 MMHG | BODY MASS INDEX: 38.36 KG/M2 | HEIGHT: 76 IN

## 2021-08-06 LAB
ABSOLUTE EOS #: 0.13 K/UL (ref 0–0.44)
ABSOLUTE IMMATURE GRANULOCYTE: 0.03 K/UL (ref 0–0.3)
ABSOLUTE LYMPH #: 1.5 K/UL (ref 1.1–3.7)
ABSOLUTE MONO #: 0.41 K/UL (ref 0.1–1.2)
ANION GAP SERPL CALCULATED.3IONS-SCNC: 16 MMOL/L (ref 9–17)
BASOPHILS # BLD: 1 % (ref 0–2)
BASOPHILS ABSOLUTE: 0.03 K/UL (ref 0–0.2)
BUN BLDV-MCNC: 21 MG/DL (ref 8–23)
BUN/CREAT BLD: 15 (ref 9–20)
CALCIUM SERPL-MCNC: 9.5 MG/DL (ref 8.6–10.4)
CHLORIDE BLD-SCNC: 103 MMOL/L (ref 98–107)
CO2: 18 MMOL/L (ref 20–31)
CREAT SERPL-MCNC: 1.44 MG/DL (ref 0.7–1.2)
DIFFERENTIAL TYPE: ABNORMAL
EOSINOPHILS RELATIVE PERCENT: 2 % (ref 1–4)
ESTIMATED AVERAGE GLUCOSE: 295 MG/DL
GFR AFRICAN AMERICAN: >60 ML/MIN
GFR NON-AFRICAN AMERICAN: 50 ML/MIN
GFR SERPL CREATININE-BSD FRML MDRD: ABNORMAL ML/MIN/{1.73_M2}
GFR SERPL CREATININE-BSD FRML MDRD: ABNORMAL ML/MIN/{1.73_M2}
GLUCOSE BLD-MCNC: 450 MG/DL (ref 70–99)
HBA1C MFR BLD: 11.9 % (ref 4–6)
HCT VFR BLD CALC: 51.2 % (ref 40.7–50.3)
HEMOGLOBIN: 16.4 G/DL (ref 13–17)
IMMATURE GRANULOCYTES: 1 %
LYMPHOCYTES # BLD: 26 % (ref 24–43)
MCH RBC QN AUTO: 30.1 PG (ref 25.2–33.5)
MCHC RBC AUTO-ENTMCNC: 32 G/DL (ref 28.4–34.8)
MCV RBC AUTO: 94.1 FL (ref 82.6–102.9)
MONOCYTES # BLD: 7 % (ref 3–12)
NRBC AUTOMATED: 0 PER 100 WBC
PDW BLD-RTO: 12.6 % (ref 11.8–14.4)
PLATELET # BLD: 171 K/UL (ref 138–453)
PLATELET ESTIMATE: ABNORMAL
PMV BLD AUTO: 10 FL (ref 8.1–13.5)
POTASSIUM SERPL-SCNC: 4.9 MMOL/L (ref 3.7–5.3)
RBC # BLD: 5.44 M/UL (ref 4.21–5.77)
RBC # BLD: ABNORMAL 10*6/UL
SEG NEUTROPHILS: 63 % (ref 36–65)
SEGMENTED NEUTROPHILS ABSOLUTE COUNT: 3.76 K/UL (ref 1.5–8.1)
SODIUM BLD-SCNC: 137 MMOL/L (ref 135–144)
WBC # BLD: 5.9 K/UL (ref 3.5–11.3)
WBC # BLD: ABNORMAL 10*3/UL

## 2021-08-06 PROCEDURE — 85025 COMPLETE CBC W/AUTO DIFF WBC: CPT

## 2021-08-06 PROCEDURE — 36415 COLL VENOUS BLD VENIPUNCTURE: CPT

## 2021-08-06 PROCEDURE — 80048 BASIC METABOLIC PNL TOTAL CA: CPT

## 2021-08-06 PROCEDURE — 83036 HEMOGLOBIN GLYCOSYLATED A1C: CPT

## 2021-08-06 PROCEDURE — 93005 ELECTROCARDIOGRAM TRACING: CPT | Performed by: ANESTHESIOLOGY

## 2021-08-06 RX ORDER — CELECOXIB 100 MG/1
100 CAPSULE ORAL DAILY
COMMUNITY

## 2021-08-06 RX ORDER — ZOLPIDEM TARTRATE 10 MG/1
10 TABLET ORAL NIGHTLY
COMMUNITY

## 2021-08-06 NOTE — H&P
History and Physical Service   [facility] [place] - [facility]PATIENT    HISTORY AND PHYSICAL EXAMINATION            Date of Evaluation: 8/6/2021  Patient name:  Russell Collier  MRN:   [id number]  YOB: 1960  PCP:    Romana Cruz MD    History Obtained From:     Patient, medical records    History of Present Illness: This is Russell Collier a 57 y.o. male who presents for a pre-admission testing appointment for an upcoming left distal symes amputation by Dr. Reyna Arthur scheduled on 8/11/2021 at 0830 due to osteomyelitis left 2nd toe, wound left 2nd toe. Patient has previous left great toe amputation due to long-term infection which led to osteomyelitis which required 2 months of IV antibiotics. Patient has since had a compression ulcer on the left 2nd toe which has been present for the last 12 weeks. Patient previously has had wound debridement and wound vac. Patient is currently on Bactrim per Dr. Reyna Arthur. Dr. Reyna Arthur recommended surgical intervention which he presents to PAT. Hx CKD, type 2 diabetes with neuropath/peripheral angiopathy, RAÚL with bipap use. Patient managed by Dr. Kisha Thrasher. Patient had labs drawn today during PAT appointment with blood sugar 450. Patient states that he went to lunch and did not take his insulin. Patient instructed to call primary care regarding blood sugars and Lantus dose prior to surgery. Patient denies any current symptoms of hyperglycemia. Patient instructed to return to the emergency room for any DKA symptoms. Functional Capacity per pt:   1) Pt is able to walk 2 city blocks on level ground without SOB. 2) Pt is able to climb 2 flights of stairs without SOB. 3) Pt is able to walk up a hill for 1-2 city blocks without SOB.     Past Medical History:     Past Medical History:   Diagnosis Date    Abscess of foot     Arthritis     Chronic kidney disease     Chronic ulcer of left foot with fat layer exposed ([facility] 10/26/2015    CKD (chronic kidney disease) stage 3, GFR 30-59 ml/min ([facility])     Class 3 severe obesity due to excess calories with serious comorbidity and body mass index (BMI) of 40.0 to 44.9 in adult ([address]     Diabetes mellitus ([address]     Diabetes mellitus due to underlying condition with diabetic neuropathy, without long-term current use of insulin ([address]     Diabetes mellitus type 2 in obese ([address] 10/26/2015    Diabetic autonomic neuropathy associated with type 2 diabetes mellitus ([address]     Diabetic foot infection ([address] 2/17/2019    Diabetic neuropathy ([address]     Gout     High anion gap metabolic acidosis 8/82/5203    History of kidney stones     Non-healing ulcer of foot, left, with unspecified severity ([address]     Obstructive sleep apnea     Osteomyelitis ([address] 12/3/2020    Sleep apnea     uses Bi-Pap nightly    Type 2 diabetes mellitus with diabetic peripheral angiopathy without gangrene ([address]     Type 2 diabetes mellitus with hyperglycemia, with long-term current use of insulin ([facility])     Ulcer of toe of left foot ([address]         Past Surgical History:     Past Surgical History:   Procedure Laterality Date    APPENDECTOMY      ARTHROPLASTY Left 12/3/2020    RESECTION LEFT FIRST MET PLANTAR WOUND DEBRIDEMENT performed by Troy Galvin DPM at [address] [place] [address] Left 01/18/2016    FOOT DEBRIDEMENT Left 2/19/2019    LEFT FOOT   INCISION AND DRAINAGE WITH WOUND VAC APPLICATION performed by Troy Galvin DPM at [address] [place] [address] Left 3/12/2019    LEFT FOOT WOUND DEBRIDEMENT WITH Melvia Heady performed by Troy Galvin DPM at [address] [place] [address] Left 05/46/4143    Application of 44 units of Apligraf biologic graft, left foot    FOOT SURGERY Left 02/19/2019    LEFT FOOT   INCISION AND DRAINAGE WITH WOUND VAC APPLICATION (Left )    FOOT SURGERY Left 2/22/2019    DELAYED PRIMARY CLOSURE LEFT FOOT      PULSEVAC performed by Troy Galvin DPM at [address] [place] [address] [place] Left 03/12/2019    wound debridement    IR INS PICC VAD W SQ PORT GREATER THAN 5  10/26/2020    IR INS PICC VAD W SQ PORT GREATER THAN 5 10/26/2020 MD RUCHI Gray SPECIAL PROCEDURES    LITHOTRIPSY  2013    NASAL SEPTUM SURGERY      OTHER SURGICAL HISTORY Left 04/03/2019    foot debridement and eppifix application    SKIN GRAFT Left 4/3/2019    THERASKIN AND EPIFIX APPLICATION  AND  LEFT FOOT WOUND DEBRIDEMENT AND WOUND PREP performed by Concepcion Mendoza DPM at [address] [place] [address] Left 4/18/2019    LEFT FOOT APPLICATION APPLIGRAFT performed by Concepcion Mendoza DPM at [address] [place] [address] Left 9/7/0937    GRAFT APPLICATION LEFT FOOT - WCB performed by Concepcion Mendoza DPM at [facility] Left 1/2016    foot    TOE AMPUTATION Left 10/21/2020    LEFT PARTIAL FIRST RAY  AMPUTATION performed by Abdulaziz Reese DPM at [address] [place] [address]        Medications Prior to Admission:     Prior to Admission medications    Medication Sig Start Date End Date Taking? Authorizing Provider   NONFORMULARY Glupizide/metformin 2 times daily   Yes Historical Provider, MD   zolpidem (AMBIEN) 10 MG tablet Take 10 mg by mouth nightly. Yes Historical Provider, MD   celecoxib (CELEBREX) 100 MG capsule Take 100 mg by mouth daily   Yes Historical Provider, MD   ciprofloxacin (CIPRO) 250 MG tablet Take 250 mg by mouth 2 times daily    Historical Provider, MD   oxyCODONE-acetaminophen (PERCOCET)  MG per tablet Take 1 tablet by mouth every 4 hours as needed for Pain.     Historical Provider, MD   diclofenac sodium (VOLTAREN) 1 % GEL Apply topically 4 times daily as needed for Pain    Historical Provider, MD   insulin glargine (LANTUS) 100 UNIT/ML injection vial Inject 40 Units into the skin 2 times daily 10/28/20   Crystal Reece, APRN - NP   Liniments (BLUE-EMU SUPER STRENGTH) CREA Apply topically as needed (to hands)    Historical Provider, MD   Handicap Placard MISC by Does not apply route Dispense one temporary handicap placard for six months for ease of transportation 2/23/19   Gisel BridePAULINA   melatonin 5 MG TABS tablet Take 5-10 mg by mouth nightly     Historical Provider, MD   fluticasone (FLONASE) 50 MCG/ACT nasal spray 1-2 sprays by Each Nostril route daily as needed (BIPAP)     Historical Provider, MD   Multiple Vitamins-Minerals (THERAPEUTIC MULTIVITAMIN-MINERALS) tablet Take 1 tablet by mouth daily    Historical Provider, MD   allopurinol (ZYLOPRIM) 300 MG tablet Take 300 mg by mouth daily    Historical Provider, MD        Allergies:     Patient has no known allergies. Social History:     Tobacco:    reports that he has never smoked. He has never used smokeless tobacco.  Alcohol:      reports current alcohol use of about 8.0 standard drinks of alcohol per week. Drug Use:  reports no history of drug use. Family History:     Family History   Problem Relation Age of Onset    Heart Disease Mother     Heart Disease Father        Review of Systems:     Positive and Negative as described in HPI. CONSTITUTIONAL:  Negative for fevers, chills, sweats, fatigue, and weight loss. HEENT: Wears reading glasses Negative for glasses, hearing changes, rhinorrhea, and throat pain. RESPIRATORY: RAÚL with bipap use. Negative for shortness of breath, cough, congestion, and wheezing. CARDIOVASCULAR:  Negative for chest pain, blood clot, irregular heartbeat, and palpitations. GASTROINTESTINAL: Diarrhea (with Metformin) Negative for reflux, nausea, vomiting, constipation, change in bowel habits, and abdominal pain. GENITOURINARY: CKD. Urinary frequency, nocturia. Negative for difficulty of urination, burning with urination, and frequency. INTEGUMENT: Non-healing wound 2nd toe left foot. Negative for rash, skin lesions, and easy bruising. HEMATOLOGIC/LYMPHATIC: Right knee swelling. ALLERGIC/IMMUNOLOGIC: Negative for urticaria and itching. ENDOCRINE: Diabetes - follows with Dr. Willi Almanzar.  Last A1C approximately 7.5 (1 month ago)  Negative for increase in thirst, increase in urination, and heat or cold intolerance. MUSCULOSKELETAL: Right knee pain, arthritis - bilateral joint pains (managed with Voltaren gel). Negative  muscle aches, and swelling of joints. NEUROLOGICAL: Diabetic neuropathy bilateral feet. Negative for headaches, dizziness, lightheadedness,   BEHAVIOR/PSYCH:  Negative for depression and anxiety. Physical Exam:   BP (!) 164/60   Pulse 87   Resp 16   Ht 6' 4\" (1.93 m)   Wt (!) 374 lb 9 oz (169.9 kg)   SpO2 98%   BMI 45.59 kg/m²   No results for input(s): POCGLU in the last 72 hours. General Appearance:  Alert, well appearing, and in no acute distress. Mental status:  Oriented to person, place, and time. Head:  Normocephalic and atraumatic. Eye: Wearing reading glasses. No icterus, redness, pupils equal and reactive, extraocular eye movements intact, and conjunctiva clear. Ear:  Hearing grossly intact. Nose:  No drainage noted. Mouth:  Mucous membranes moist.  Neck:  Supple and no carotid bruits noted. Lungs: Diminished lung sounds. Bilateral equal air entry, no wheezing, rales or rhonchi, and normal effort. Cardiovascular:  Normal rate, regular rhythm, no murmur, gallop, or rub. Abdomen:  Soft, nontender, nondistended, and active bowel sounds. Neurologic: Normal speech and cranial nerves II through XII grossly intact. Strength 5/5 bilaterally. Skin: No gross lesions, rashes, bruising, or bleeding on exposed skin area. Extremities: Posterior tibial pulses 2+ bilaterally. No pedal edema. No calf tenderness with palpation. Psych: Normal affect.      Investigations:      Laboratory Testing:  Recent Results (from the past 24 hour(s))   CBC Auto Differential    Collection Time: 08/06/21  2:05 PM   Result Value Ref Range    WBC 5.9 3.5 - 11.3 k/uL    RBC 5.44 4.21 - 5.77 m/uL    Hemoglobin 16.4 13.0 - 17.0 g/dL    Hematocrit 51.2 (H) 40.7 - 50.3 %    MCV 94.1 82.6 - 102.9 fL    MCH 30.1 25.2 - 33.5 pg    MCHC 32.0 28.4 - 34.8 g/dL    RDW 12.6

## 2021-08-06 NOTE — PRE-PROCEDURE INSTRUCTIONS
infection.   Do not shave the area of your body where your surgery will be performed unless you received specific permission from your physician. You will need to shower at home the night before surgery and the morning of surgery with a special soap called chlorhexidine gluconate (CHG*). *Not to be used by people allergic to Chlorhexidine Gluconate (CHG). Following these instructions will help you be sure that your skin is clean before surgery. Instructions on cleaning your skin before surgery: The night before your surgery:      You will need to shower with warm water (not hot) and the CHG soap.  Use a clean wash cloth and a clean towel. Have clean clothes available to put on after the shower.    First wash your hair with regular shampoo. Rinse your hair and body thoroughly to remove the shampoo. [employer] Wash your face with your regular soap or water only. Thoroughly rinse your body with warm water from the neck down.  Turn water off to prevent rinsing the soap off too soon.  With a clean wet washcloth and half of the CHG soap in the bottle, lather your entire body from the neck down. Do not use CHG soap near your eyes or ears to avoid injury to those areas.  Wash thoroughly, paying special attention to the area where your surgery will be performed.  Wash your body gently for five (5) minutes. Avoid scrubbing your skin too hard.  Turn the water back on and rinse your body thoroughly.  Pat yourself dry with a clean, soft towel. Do not apply lotion, cream or powder.  Dress with clean freshly washed clothes. The morning of surgery:     Repeat shower following steps above - using remaining half of CHG soap in bottle. Patient Instructions:    [employer] If you are having any type of anesthesia you are to have nothing to eat or drink after midnight the night before your surgery.   This includes gum, mints, water or smoking or chewing tobacco.  The only exception to this is a small sip of water to take with any morning dose of heart, blood pressure, or seizure medications. No alcoholic beverages for 24 hours prior to surgery.  Bring your eyeglasses and case with you. No contacts are to be worn the day of surgery. You also may bring your hearing aids. Most surgical procedures involving anesthesia will require that you remove your dentures prior to surgery.  If you are on C-PAP or Bi-PAP at home and plan on staying in the hospital overnight for your surgery please bring the machine with you. · Do not wear any jewelry or body piercings day of surgery. Also, NO lotion, perfume or deodorant to be used the day of surgery. No nail polish on the operative extremity (arm/leg surgeries)    ·   If you are staying overnight with us, please bring a small bag of personal items.  Please wear loose, comfortable clothing. If you are potentially going to have a cast or brace bring clothing that will fit over them.  In case of illness - If you have cold or flu like symptoms (high fever, runny nose, sore throat, cough, etc.) rash, nausea, vomiting, loose stools, and/or recent contact with someone who has a contagious disease (chicken pox, measles, etc.) Please call your doctor before coming to the hospital.     If your child is having surgery please make arrangements for any other children to be cared for at home on the day of surgery. Other children are not permitted in recovery room and we want you to be able to spend time with the patient. If other arrangements are not available then we suggest that you have a second adult to stay in the waiting room. Day of Surgery/Procedure:    As a patient at [facility] - INPATIENT you can expect quality medical and nursing care that is centered on your individual needs.   Our goal is to make your surgical experience as comfortable as possible    . Transportation After Your Surgery/Procedure: You will need a friend or family member to drive you home after your procedure. Your  must be 25years of age or older and able to sign off on your discharge instructions. A taxi cab or any other form of public transportation is not acceptable. Your friend or family member must stay at the hospital throughout your procedure. Someone must remain with you for the first 24 hours after your surgery if you receive anesthesia or medication. If you do not have someone to stay with you, your procedure may be cancelled.       If you have any other questions regarding your procedure or the day of surgery, please call [phone]      _________________________  ____________________________  Signature (Patient)              Signature (Provider) & date

## 2021-08-06 NOTE — PROGRESS NOTES
Pt in for testing prior to surgery. Blood sugar 450. Pt was notified of blood sugar result. Pt did verbalize he had just ate and took his medication 40 minutes prior to appointment.

## 2021-08-07 LAB
EKG ATRIAL RATE: 75 BPM
EKG P AXIS: 17 DEGREES
EKG P-R INTERVAL: 170 MS
EKG Q-T INTERVAL: 362 MS
EKG QRS DURATION: 90 MS
EKG QTC CALCULATION (BAZETT): 404 MS
EKG R AXIS: 85 DEGREES
EKG T AXIS: 4 DEGREES
EKG VENTRICULAR RATE: 75 BPM

## 2021-08-10 ENCOUNTER — ANESTHESIA EVENT (OUTPATIENT)
Dept: OPERATING ROOM | Age: 61
End: 2021-08-10
Payer: COMMERCIAL

## 2021-08-11 ENCOUNTER — APPOINTMENT (OUTPATIENT)
Dept: GENERAL RADIOLOGY | Age: 61
End: 2021-08-11
Attending: PODIATRIST
Payer: COMMERCIAL

## 2021-08-11 ENCOUNTER — ANESTHESIA (OUTPATIENT)
Dept: OPERATING ROOM | Age: 61
End: 2021-08-11
Payer: COMMERCIAL

## 2021-08-11 ENCOUNTER — HOSPITAL ENCOUNTER (OUTPATIENT)
Age: 61
Setting detail: OUTPATIENT SURGERY
Discharge: HOME | End: 2021-08-11
Attending: PODIATRIST | Admitting: PODIATRIST
Payer: COMMERCIAL

## 2021-08-11 VITALS
RESPIRATION RATE: 22 BRPM | TEMPERATURE: 98.2 F | OXYGEN SATURATION: 96 % | DIASTOLIC BLOOD PRESSURE: 81 MMHG | HEART RATE: 64 BPM | SYSTOLIC BLOOD PRESSURE: 137 MMHG

## 2021-08-11 VITALS — SYSTOLIC BLOOD PRESSURE: 130 MMHG | DIASTOLIC BLOOD PRESSURE: 73 MMHG | OXYGEN SATURATION: 98 %

## 2021-08-11 DIAGNOSIS — G89.18 ACUTE POST-OPERATIVE PAIN: Primary | ICD-10-CM

## 2021-08-11 LAB
GLUCOSE BLD-MCNC: 130 MG/DL (ref 75–110)
GLUCOSE BLD-MCNC: 152 MG/DL (ref 75–110)

## 2021-08-11 PROCEDURE — 7100000010 HC PHASE II RECOVERY - FIRST 15 MIN: Performed by: PODIATRIST

## 2021-08-11 PROCEDURE — 82947 ASSAY GLUCOSE BLOOD QUANT: CPT

## 2021-08-11 PROCEDURE — 2720000010 HC SURG SUPPLY STERILE: Performed by: PODIATRIST

## 2021-08-11 PROCEDURE — 7100000001 HC PACU RECOVERY - ADDTL 15 MIN: Performed by: PODIATRIST

## 2021-08-11 PROCEDURE — 87176 TISSUE HOMOGENIZATION CULTR: CPT

## 2021-08-11 PROCEDURE — 3600000012 HC SURGERY LEVEL 2 ADDTL 15MIN: Performed by: PODIATRIST

## 2021-08-11 PROCEDURE — 6360000002 HC RX W HCPCS

## 2021-08-11 PROCEDURE — 73630 X-RAY EXAM OF FOOT: CPT

## 2021-08-11 PROCEDURE — 88311 DECALCIFY TISSUE: CPT

## 2021-08-11 PROCEDURE — 7100000011 HC PHASE II RECOVERY - ADDTL 15 MIN: Performed by: PODIATRIST

## 2021-08-11 PROCEDURE — 87070 CULTURE OTHR SPECIMN AEROBIC: CPT

## 2021-08-11 PROCEDURE — 2709999900 HC NON-CHARGEABLE SUPPLY: Performed by: PODIATRIST

## 2021-08-11 PROCEDURE — 2580000003 HC RX 258

## 2021-08-11 PROCEDURE — 87075 CULTR BACTERIA EXCEPT BLOOD: CPT

## 2021-08-11 PROCEDURE — 7100000000 HC PACU RECOVERY - FIRST 15 MIN: Performed by: PODIATRIST

## 2021-08-11 PROCEDURE — 3700000001 HC ADD 15 MINUTES (ANESTHESIA): Performed by: PODIATRIST

## 2021-08-11 PROCEDURE — 2500000003 HC RX 250 WO HCPCS

## 2021-08-11 PROCEDURE — 3600000002 HC SURGERY LEVEL 2 BASE: Performed by: PODIATRIST

## 2021-08-11 PROCEDURE — 88305 TISSUE EXAM BY PATHOLOGIST: CPT

## 2021-08-11 PROCEDURE — 6360000002 HC RX W HCPCS: Performed by: STUDENT IN AN ORGANIZED HEALTH CARE EDUCATION/TRAINING PROGRAM

## 2021-08-11 PROCEDURE — 3700000000 HC ANESTHESIA ATTENDED CARE: Performed by: PODIATRIST

## 2021-08-11 PROCEDURE — 2500000003 HC RX 250 WO HCPCS: Performed by: PODIATRIST

## 2021-08-11 PROCEDURE — 2580000003 HC RX 258: Performed by: STUDENT IN AN ORGANIZED HEALTH CARE EDUCATION/TRAINING PROGRAM

## 2021-08-11 PROCEDURE — 87205 SMEAR GRAM STAIN: CPT

## 2021-08-11 RX ORDER — SODIUM CHLORIDE 0.9 % (FLUSH) 0.9 %
10 SYRINGE (ML) INJECTION PRN
Status: DISCONTINUED | OUTPATIENT
Start: 2021-08-11 | End: 2021-08-11 | Stop reason: HOSPADM

## 2021-08-11 RX ORDER — DEXAMETHASONE SODIUM PHOSPHATE 10 MG/ML
INJECTION, SOLUTION INTRAMUSCULAR; INTRAVENOUS PRN
Status: DISCONTINUED | OUTPATIENT
Start: 2021-08-11 | End: 2021-08-11 | Stop reason: SDUPTHER

## 2021-08-11 RX ORDER — LABETALOL HYDROCHLORIDE 5 MG/ML
5 INJECTION, SOLUTION INTRAVENOUS EVERY 10 MIN PRN
Status: DISCONTINUED | OUTPATIENT
Start: 2021-08-11 | End: 2021-08-11 | Stop reason: HOSPADM

## 2021-08-11 RX ORDER — SODIUM CHLORIDE 0.9 % (FLUSH) 0.9 %
10 SYRINGE (ML) INJECTION EVERY 12 HOURS SCHEDULED
Status: DISCONTINUED | OUTPATIENT
Start: 2021-08-11 | End: 2021-08-11 | Stop reason: HOSPADM

## 2021-08-11 RX ORDER — 0.9 % SODIUM CHLORIDE 0.9 %
500 INTRAVENOUS SOLUTION INTRAVENOUS
Status: DISCONTINUED | OUTPATIENT
Start: 2021-08-11 | End: 2021-08-11 | Stop reason: HOSPADM

## 2021-08-11 RX ORDER — HYDRALAZINE HYDROCHLORIDE 20 MG/ML
5 INJECTION INTRAMUSCULAR; INTRAVENOUS EVERY 10 MIN PRN
Status: DISCONTINUED | OUTPATIENT
Start: 2021-08-11 | End: 2021-08-11 | Stop reason: HOSPADM

## 2021-08-11 RX ORDER — OXYCODONE HYDROCHLORIDE AND ACETAMINOPHEN 5; 325 MG/1; MG/1
1 TABLET ORAL
Status: DISCONTINUED | OUTPATIENT
Start: 2021-08-11 | End: 2021-08-11 | Stop reason: HOSPADM

## 2021-08-11 RX ORDER — KETAMINE HCL IN NACL, ISO-OSM 100MG/10ML
SYRINGE (ML) INJECTION PRN
Status: DISCONTINUED | OUTPATIENT
Start: 2021-08-11 | End: 2021-08-11 | Stop reason: SDUPTHER

## 2021-08-11 RX ORDER — LIDOCAINE HYDROCHLORIDE 20 MG/ML
INJECTION, SOLUTION EPIDURAL; INFILTRATION; INTRACAUDAL; PERINEURAL PRN
Status: DISCONTINUED | OUTPATIENT
Start: 2021-08-11 | End: 2021-08-11 | Stop reason: SDUPTHER

## 2021-08-11 RX ORDER — DOXYCYCLINE HYCLATE 100 MG
100 TABLET ORAL DAILY
COMMUNITY
Start: 2021-08-09

## 2021-08-11 RX ORDER — SODIUM CHLORIDE 9 MG/ML
25 INJECTION, SOLUTION INTRAVENOUS PRN
Status: DISCONTINUED | OUTPATIENT
Start: 2021-08-11 | End: 2021-08-11 | Stop reason: HOSPADM

## 2021-08-11 RX ORDER — METOCLOPRAMIDE HYDROCHLORIDE 5 MG/ML
10 INJECTION INTRAMUSCULAR; INTRAVENOUS
Status: DISCONTINUED | OUTPATIENT
Start: 2021-08-11 | End: 2021-08-11 | Stop reason: HOSPADM

## 2021-08-11 RX ORDER — PROPOFOL 10 MG/ML
INJECTION, EMULSION INTRAVENOUS PRN
Status: DISCONTINUED | OUTPATIENT
Start: 2021-08-11 | End: 2021-08-11 | Stop reason: SDUPTHER

## 2021-08-11 RX ORDER — LIDOCAINE HYDROCHLORIDE 10 MG/ML
1 INJECTION, SOLUTION EPIDURAL; INFILTRATION; INTRACAUDAL; PERINEURAL
Status: DISCONTINUED | OUTPATIENT
Start: 2021-08-11 | End: 2021-08-11 | Stop reason: HOSPADM

## 2021-08-11 RX ORDER — HYDROMORPHONE HYDROCHLORIDE 1 MG/ML
0.5 INJECTION, SOLUTION INTRAMUSCULAR; INTRAVENOUS; SUBCUTANEOUS EVERY 5 MIN PRN
Status: DISCONTINUED | OUTPATIENT
Start: 2021-08-11 | End: 2021-08-11 | Stop reason: HOSPADM

## 2021-08-11 RX ORDER — LIDOCAINE HYDROCHLORIDE 20 MG/ML
INJECTION, SOLUTION EPIDURAL; INFILTRATION; INTRACAUDAL; PERINEURAL PRN
Status: DISCONTINUED | OUTPATIENT
Start: 2021-08-11 | End: 2021-08-11 | Stop reason: ALTCHOICE

## 2021-08-11 RX ORDER — DIPHENHYDRAMINE HYDROCHLORIDE 50 MG/ML
12.5 INJECTION INTRAMUSCULAR; INTRAVENOUS
Status: DISCONTINUED | OUTPATIENT
Start: 2021-08-11 | End: 2021-08-11 | Stop reason: HOSPADM

## 2021-08-11 RX ORDER — SODIUM CHLORIDE 9 MG/ML
INJECTION, SOLUTION INTRAVENOUS CONTINUOUS
Status: DISCONTINUED | OUTPATIENT
Start: 2021-08-11 | End: 2021-08-11 | Stop reason: HOSPADM

## 2021-08-11 RX ORDER — FENTANYL CITRATE 50 UG/ML
25 INJECTION, SOLUTION INTRAMUSCULAR; INTRAVENOUS EVERY 5 MIN PRN
Status: DISCONTINUED | OUTPATIENT
Start: 2021-08-11 | End: 2021-08-11 | Stop reason: HOSPADM

## 2021-08-11 RX ORDER — SODIUM CHLORIDE, SODIUM LACTATE, POTASSIUM CHLORIDE, CALCIUM CHLORIDE 600; 310; 30; 20 MG/100ML; MG/100ML; MG/100ML; MG/100ML
INJECTION, SOLUTION INTRAVENOUS CONTINUOUS PRN
Status: DISCONTINUED | OUTPATIENT
Start: 2021-08-11 | End: 2021-08-11 | Stop reason: SDUPTHER

## 2021-08-11 RX ORDER — PROMETHAZINE HYDROCHLORIDE 25 MG/ML
6.25 INJECTION, SOLUTION INTRAMUSCULAR; INTRAVENOUS
Status: DISCONTINUED | OUTPATIENT
Start: 2021-08-11 | End: 2021-08-11 | Stop reason: HOSPADM

## 2021-08-11 RX ORDER — SODIUM CHLORIDE, SODIUM LACTATE, POTASSIUM CHLORIDE, CALCIUM CHLORIDE 600; 310; 30; 20 MG/100ML; MG/100ML; MG/100ML; MG/100ML
INJECTION, SOLUTION INTRAVENOUS CONTINUOUS
Status: DISCONTINUED | OUTPATIENT
Start: 2021-08-11 | End: 2021-08-11 | Stop reason: HOSPADM

## 2021-08-11 RX ADMIN — Medication 30 MG: at 08:55

## 2021-08-11 RX ADMIN — Medication 10 MG: at 09:05

## 2021-08-11 RX ADMIN — Medication 10 MG: at 09:12

## 2021-08-11 RX ADMIN — CEFAZOLIN SODIUM 3000 MG: 10 INJECTION, POWDER, FOR SOLUTION INTRAVENOUS at 08:52

## 2021-08-11 RX ADMIN — LIDOCAINE HYDROCHLORIDE 100 MG: 20 INJECTION, SOLUTION EPIDURAL; INFILTRATION; INTRACAUDAL; PERINEURAL at 08:52

## 2021-08-11 RX ADMIN — PROPOFOL 100 MCG/KG/MIN: 10 INJECTION, EMULSION INTRAVENOUS at 08:53

## 2021-08-11 RX ADMIN — SODIUM CHLORIDE, POTASSIUM CHLORIDE, SODIUM LACTATE AND CALCIUM CHLORIDE: 600; 310; 30; 20 INJECTION, SOLUTION INTRAVENOUS at 08:41

## 2021-08-11 RX ADMIN — DEXAMETHASONE SODIUM PHOSPHATE 10 MG: 10 INJECTION INTRAMUSCULAR; INTRAVENOUS at 09:07

## 2021-08-11 ASSESSMENT — PULMONARY FUNCTION TESTS
PIF_VALUE: 0
PIF_VALUE: 1
PIF_VALUE: 1
PIF_VALUE: 0
PIF_VALUE: 1
PIF_VALUE: 0
PIF_VALUE: 1
PIF_VALUE: 0
PIF_VALUE: 0
PIF_VALUE: 1
PIF_VALUE: 0
PIF_VALUE: 1
PIF_VALUE: 0
PIF_VALUE: 0
PIF_VALUE: 1

## 2021-08-11 ASSESSMENT — PAIN DESCRIPTION - FREQUENCY: FREQUENCY: CONTINUOUS

## 2021-08-11 ASSESSMENT — PAIN SCALES - GENERAL
PAINLEVEL_OUTOF10: 0
PAINLEVEL_OUTOF10: 5

## 2021-08-11 ASSESSMENT — PAIN DESCRIPTION - DESCRIPTORS: DESCRIPTORS: ACHING

## 2021-08-11 ASSESSMENT — PAIN DESCRIPTION - ORIENTATION: ORIENTATION: LEFT

## 2021-08-11 ASSESSMENT — PAIN DESCRIPTION - LOCATION: LOCATION: KNEE

## 2021-08-11 ASSESSMENT — PAIN DESCRIPTION - PAIN TYPE: TYPE: CHRONIC PAIN

## 2021-08-11 NOTE — BRIEF OP NOTE
PODIATRY BRIEF OP NOTE    PATIENT NAME: Ty Higginbotham DATE: 1960  -  57 y.o. male  MRN: [id number]  DATE: 8/11/2021  BILLING #: [id number]    Surgeon(s): Cristal Carey DPM     ASSISTANTS: Lisa Saldana DPM PGY3, Heidi Rowell MS4    PRE-OP DIAGNOSIS:   1. Chronic non healing wound 2nd digit, right foot  2. Possible osteomyelitis of 2nd digit, right foot  3. Contracted digit 2nd digit, right foot    POST-OP DIAGNOSIS: Same as above. PROCEDURE:   1. Distal symes amputation of 2nd digit, right foot    ANESTHESIA: MAC with local (10 cc2% lidocaine plain)    HEMOSTASIS: Pneumatic left ankle tourniquet @ 250 mmHg for 15 minutes. ESTIMATED BLOOD LOSS: Less than 5 cc. MATERIALS: 4-0 nylon  * No implants in log *    INJECTABLES: none    SPECIMEN: 2nd digit tuft and clearance fragment from the head of the middle phalanx  ID Type Source Tests Collected by Time Destination   1 : left foot 2nd toe bone culture Bone Toe GRAM STAIN, CULTURE, ANAEROBIC AND AEROBIC Cristal Carey DPM 8/11/2021 6324    A : left foot 2nd toe Tissue Toe SURGICAL PATHOLOGY Cristal Carey DPM 8/11/2021 3426        COMPLICATIONS: none    FINDINGS: Partial 2nd digit amputation. No purulent drainage or no underlying necrotic tissue noted    The patient was counseled at length about the risks of jossue Covid-19 during their perioperative period and any recovery window from their procedure. The patient was made aware that jossue Covid-19  may worsen their prognosis for recovering from their procedure  and lend to a higher morbidity and/or mortality risk. All material risks, benefits, and reasonable alternatives including postponing the procedure were discussed. The patient does wish to proceed with the procedure at this time.     Lisa Saldana DPM   Podiatric Medicine & Surgery   8/11/2021 at 9:28 AM

## 2021-08-11 NOTE — H&P
Interval H&P Note    Pt Name: Allie Lawrence  MRN: [id number]  YOB: 1960  Date of evaluation: 8/11/2021      [x] I have reviewed in [facility] H&P done in [facility] by Natacha Bernstein CNP 8/6/21 for an Interval History and Physical note. [x] I have examined  Allie Lawrence, a 56 yo morbidly obese male with multiple comorbidities managed by specialists  There are no changes to the patient who is scheduled for a left distal symes amputation by Dr Francisca Solares for osteomyelitis left 2nd toe, wound left 2nd toe. Denies new health changes. Hx RAÚL on Bipap nightly  Stated DM good since PAT \"I forgot to take my insulin the day I came to [facility] and my sugars got better\"  The patient denies fever, chills, wheezing, cough, increased SOB, palpitations or chest pain Denies new open sores or draining wounds. Vital signs: /69   Pulse 70   Temp 97.1 °F (36.2 °C) (Temporal)   Resp 16   SpO2 95%     Allergies:  Patient has no known allergies. Medications:    Prior to Admission medications    Medication Sig Start Date End Date Taking? Authorizing Provider   NONFORMULARY Glupizide/metformin 2 times daily   Yes Historical Provider, MD   zolpidem (AMBIEN) 10 MG tablet Take 10 mg by mouth nightly. Yes Historical Provider, MD   oxyCODONE-acetaminophen (PERCOCET)  MG per tablet Take 1 tablet by mouth every 4 hours as needed for Pain.    Yes Historical Provider, MD   diclofenac sodium (VOLTAREN) 1 % GEL Apply topically 4 times daily as needed for Pain   Yes Historical Provider, MD   insulin glargine (LANTUS) 100 UNIT/ML injection vial Inject 40 Units into the skin 2 times daily 10/28/20  Yes LIBBY Olvera NP   melatonin 5 MG TABS tablet Take 5-10 mg by mouth nightly    Yes Historical Provider, MD   Multiple Vitamins-Minerals (THERAPEUTIC MULTIVITAMIN-MINERALS) tablet Take 1 tablet by mouth daily   Yes Historical Provider, MD   doxycycline hyclate (VIBRA-TABS) 100 MG tablet Take 100 mg by mouth daily 8/9/21 Historical Provider, MD   celecoxib (CELEBREX) 100 MG capsule Take 100 mg by mouth daily    Historical Provider, MD   Liniments (BLUE-EMU SUPER STRENGTH) CREA Apply topically as needed (to hands)    Historical Provider, MD   Handicap Placard MISC by Does not apply route Dispense one temporary handicap placard for six months for ease of transportation 2/23/19   Suzi Luciano DPROSELINE   fluticasone (FLONASE) 50 MCG/ACT nasal spray 1-2 sprays by Each Nostril route daily as needed (BIPAP)     Historical Provider, MD   allopurinol (ZYLOPRIM) 300 MG tablet Take 300 mg by mouth daily    Historical Provider, MD         This is a 57 y.o.morbidly obese male who is pleasant, cooperative, alert and oriented x3, in no acute dist    Physical Exam:  Lungs: Bilateral equal air entry, mildly diminished bases, unlabored at rest w/o accessory muscles, wheezing, rales or rhonchi, normal effort  Cardiovascular: HR 70 normal rate, regular rhythm, no murmur, gallop, rub. Abdomen: Soft, obese with fold, nontender, nondistended, normal bowel sounds,    Labs:  Recent Labs     08/06/21  1405   HGB 16.4   HCT 51.2*   WBC 5.9   MCV 94.1         K 4.9      CO2 18*   BUN 21   CREATININE 1.44*   GLUCOSE 450*       No results for input(s): COVID19 in the last 720 hours.     LIBBY Khan CNP   Electronically signed 8/11/2021 at 7:28 AM    LIBBY Sanabria CNP   Nurse Practitioner   General Surgery   H&P       Cosign Needed   Date of Service:  8/6/2021  3:00 PM         Related encounter: [facility]-Admission Testing [facility] from 8/6/2021 [facility]     History and Physical Service   [address]            Date of Evaluation:     8/6/2021  Patient name:              Jolene Payne  MRN:                           [id number]  YOB: 1960  PCP: Audrey Whitmore MD     History Obtained From:      Patient, medical records     History of Present Illness: This is Maribel Colon a 57 y.o. male who presents for a pre-admission testing appointment for an upcoming left distal symes amputation by Dr. Cecilia King scheduled on 8/11/2021 at 0830 due to osteomyelitis left 2nd toe, wound left 2nd toe. Patient has previous left great toe amputation due to long-term infection which led to osteomyelitis which required 2 months of IV antibiotics. Patient has since had a compression ulcer on the left 2nd toe which has been present for the last 12 weeks. Patient previously has had wound debridement and wound vac. Patient is currently on Bactrim per Dr. Cecilia King. Dr. Cecilia King recommended surgical intervention which he presents to PAT. Hx CKD, type 2 diabetes with neuropath/peripheral angiopathy, RAÚL with bipap use. Patient managed by Dr. Ismael Navarro. Patient had labs drawn today during PAT appointment with blood sugar 450. Patient states that he went to lunch and did not take his insulin. Patient instructed to call primary care regarding blood sugars and Lantus dose prior to surgery. Patient denies any current symptoms of hyperglycemia. Patient instructed to return to the emergency room for any DKA symptoms. Functional Capacity per pt:              1) Pt is able to walk 2 city blocks on level ground without SOB. 2) Pt is able to climb 2 flights of stairs without SOB. 3) Pt is able to walk up a hill for 1-2 city blocks without SOB.      Past Medical History:      Past Medical History        Past Medical History:   Diagnosis Date    Abscess of foot      Arthritis      Chronic kidney disease      [facility] left foot with [facility] layer exposed [facility] 10/26/2015    CKD (chronic kidney disease) stage 3, GFR 30-59 ml/min ([facility])      Class 3 severe obesity due to excess calories with serious comorbidity and body mass index (BMI) of 40.0 to 44.9 in adult ([facility]      Diabetes mellitus ([facility]      Diabetes mellitus due to underlying condition with diabetic neuropathy, without long-term current use of insulin ([address]      Diabetes mellitus type 2 in obese ([address] 10/26/2015    Diabetic autonomic neuropathy associated with type 2 diabetes mellitus ([address]      Diabetic foot infection ([facility] 2/17/2019    Diabetic neuropathy ([facility]      Gout      High anion gap metabolic acidosis 3/49/0402    History of kidney stones      Non-healing ulcer of foot, left, with unspecified severity ([facility]      Obstructive sleep apnea      Osteomyelitis ([facility] 12/3/2020    Sleep apnea       uses Bi-Pap nightly    Type 2 diabetes mellitus with diabetic peripheral angiopathy without gangrene ([address]      Type 2 diabetes mellitus with hyperglycemia, with long-term current use of insulin ([facility])      Ulcer of toe of left foot ([facility]              Past Surgical History:      Past Surgical History         Past Surgical History:   Procedure Laterality Date    APPENDECTOMY        ARTHROPLASTY Left 12/3/2020     RESECTION LEFT FIRST MET PLANTAR WOUND DEBRIDEMENT performed by Rene Pineda DPM at [facility] 01/18/2016    FOOT DEBRIDEMENT Left 2/19/2019     LEFT FOOT   INCISION AND DRAINAGE WITH WOUND VAC APPLICATION performed by Rene Pineda DPM at [facility] 3/12/2019     LEFT FOOT WOUND [address] performed by Rene Pineda DPM at [facility] 70/49/4312     Application of 44 units of Apligraf biologic graft, left foot    FOOT SURGERY Left 02/19/2019     LEFT FOOT   INCISION AND DRAINAGE WITH WOUND VAC APPLICATION (Left )    FOOT SURGERY Left 2/22/2019     DELAYED PRIMARY CLOSURE LEFT FOOT      PULSEVAC performed by Rene Pineda DPM at [address] [place] [address] Left 03/12/2019     wound debridement    IR INS PICC VAD W SQ PORT GREATER THAN 5   10/26/2020     IR INS PICC VAD W SQ PORT GREATER THAN 5 10/26/2020 Sangeetha Gamboa MD [facility]    LITHOTRIPSY   2013    NASAL SEPTUM SURGERY        OTHER SURGICAL HISTORY Left 04/03/2019     foot debridement and eppifix application    SKIN GRAFT Left 4/3/2019     THERASKIN AND EPIFIX APPLICATION  AND  LEFT FOOT WOUND DEBRIDEMENT AND WOUND PREP performed by Maximo Alston DPM at [address] [place] [address] Left 4/18/2019     LEFT FOOT APPLICATION APPLIGRAFT performed by Maximo Alston DPM at [address] [place] [address] Left 9/6/6391     GRAFT APPLICATION LEFT FOOT - WCB performed by Maximo [facility]ston DPM at [facility] Left 1/2016     foot    TOE AMPUTATION Left 10/21/2020     LEFT PARTIAL FIRST RAY  AMPUTATION performed by Seema Oleary DPM at [address] [place] [address]            Medications Prior to Admission:      Home Medications           Prior to Admission medications    Medication Sig Start Date End Date Taking? Authorizing Provider   NONFORMULARY Glupizide/metformin 2 times daily     Yes Historical Provider, MD   zolpidem (AMBIEN) 10 MG tablet Take 10 mg by mouth nightly. Yes Historical Provider, MD   celecoxib (CELEBREX) 100 MG capsule Take 100 mg by mouth daily     Yes Historical Provider, MD   ciprofloxacin (CIPRO) 250 MG tablet Take 250 mg by mouth 2 times daily       Historical Provider, MD   oxyCODONE-acetaminophen (PERCOCET)  MG per tablet Take 1 tablet by mouth every 4 hours as needed for Pain.        Historical Provider, MD   diclofenac sodium (VOLTAREN) 1 % GEL Apply topically 4 times daily as needed for Pain       Historical Provider, MD   insulin glargine (LANTUS) 100 UNIT/ML injection vial Inject 40 Units into the skin 2 times daily 10/28/20     LIBBY Rodriguez - MARLO   Liniments (BLUE-EMU SUPER STRENGTH) CREA Apply topically as needed (to hands)       Historical Provider, MD   Handicap Placard MISC by Does not apply route Dispense one temporary handicap placard for six months for ease of transportation 2/23/19      PatientPAULINA melatonin 5 MG TABS tablet Take 5-10 mg by mouth nightly        Historical Provider, MD   fluticasone (FLONASE) 50 MCG/ACT nasal spray 1-2 sprays by Each Nostril route daily as needed (BIPAP)        Historical Provider, MD   Multiple Vitamins-Minerals (THERAPEUTIC MULTIVITAMIN-MINERALS) tablet Take 1 tablet by mouth daily       Historical Provider, MD   allopurinol (ZYLOPRIM) 300 MG tablet Take 300 mg by mouth daily       Historical Provider, MD            Allergies:      Patient has no known allergies. Social History:      Tobacco:    reports that he has never smoked. He has never used smokeless tobacco.  Alcohol:      reports current alcohol use of about 8.0 standard drinks of alcohol per week. Drug Use:  reports no history of drug use. Family History:      Family History         Family History   Problem Relation Age of Onset    Heart Disease Mother      Heart Disease Father              Review of Systems:      Positive and Negative as described in HPI. CONSTITUTIONAL:  Negative for fevers, chills, sweats, fatigue, and weight loss. HEENT: Wears reading glasses Negative for glasses, hearing changes, rhinorrhea, and throat pain. RESPIRATORY: RAÚL with bipap use. Negative for shortness of breath, cough, congestion, and wheezing. CARDIOVASCULAR:  Negative for chest pain, blood clot, irregular heartbeat, and palpitations. GASTROINTESTINAL: Diarrhea (with Metformin) Negative for reflux, nausea, vomiting, constipation, change in bowel habits, and abdominal pain. GENITOURINARY: CKD. Urinary frequency, nocturia. Negative for difficulty of urination, burning with urination, and frequency. INTEGUMENT: Non-healing wound 2nd toe left foot. Negative for rash, skin lesions, and easy bruising. HEMATOLOGIC/LYMPHATIC: Right knee swelling. ALLERGIC/IMMUNOLOGIC: Negative for urticaria and itching. ENDOCRINE: Diabetes - follows with Dr. Gato Villanueva.  Last A1C approximately 7.5 (1 month ago)  Negative for increase in thirst, increase in urination, and heat or cold intolerance. MUSCULOSKELETAL: Right knee pain, arthritis - bilateral joint pains (managed with Voltaren gel). Negative  muscle aches, and swelling of joints. NEUROLOGICAL: Diabetic neuropathy bilateral feet. Negative for headaches, dizziness, lightheadedness,   BEHAVIOR/PSYCH:  Negative for depression and anxiety. Physical Exam:   BP (!) 164/60   Pulse 87   Resp 16   Ht 6' 4\" (1.93 m)   Wt (!) 374 lb 9 oz (169.9 kg)   SpO2 98%   BMI 45.59 kg/m²   No results for input(s): POCGLU in the last 72 hours. General Appearance:  Alert, well appearing, and in no acute distress. Mental status:  Oriented to person, place, and time. Head:  Normocephalic and atraumatic. Eye: Wearing reading glasses. No icterus, redness, pupils equal and reactive, extraocular eye movements intact, and conjunctiva clear. Ear:  Hearing grossly intact. Nose:  No drainage noted. Mouth:  Mucous membranes moist.  Neck:  Supple and no carotid bruits noted. Lungs: Diminished lung sounds. Bilateral equal air entry, no wheezing, rales or rhonchi, and normal effort. Cardiovascular:  Normal rate, regular rhythm, no murmur, gallop, or rub. Abdomen:  Soft, nontender, nondistended, and active bowel sounds. Neurologic: Normal speech and cranial nerves II through XII grossly intact. Strength 5/5 bilaterally. Skin: No gross lesions, rashes, bruising, or bleeding on exposed skin area. Extremities: Posterior tibial pulses 2+ bilaterally. No pedal edema. No calf tenderness with palpation. Psych: Normal affect.       Investigations:       Laboratory Testing:  Recent Results         Recent Results (from the past 24 hour(s))   CBC Auto Differential     Collection Time: 08/06/21  2:05 PM   Result Value Ref Range     WBC 5.9 3.5 - 11.3 k/uL     RBC 5.44 4.21 - 5.77 m/uL     Hemoglobin 16.4 13.0 - 17.0 g/dL     Hematocrit 51.2 (H) 40.7 - 50.3 %     MCV 94.1 82.6 - 102.9 fL     MCH 30.1 25.2 - 33.5 pg     MCHC 32.0 28.4 - 34.8 g/dL     RDW 12.6 11.8 - 14.4 %     Platelets 849 155 - 136 k/uL     MPV 10.0 8.1 - 13.5 fL     NRBC Automated 0.0 0.0 per 100 WBC     Differential Type NOT REPORTED       Seg Neutrophils 63 36 - 65 %     Lymphocytes 26 24 - 43 %     Monocytes 7 3 - 12 %     Eosinophils % 2 1 - 4 %     Basophils 1 0 - 2 %     Immature Granulocytes 1 (H) 0 %     Segs Absolute 3.76 1.50 - 8.10 k/uL     Absolute Lymph # 1.50 1.10 - 3.70 k/uL     Absolute Mono # 0.41 0.10 - 1.20 k/uL     Absolute Eos # 0.13 0.00 - 0.44 k/uL     Basophils Absolute 0.03 0.00 - 0.20 k/uL     Absolute Immature Granulocyte 0.03 0.00 - 0.30 k/uL     WBC Morphology NOT REPORTED       RBC Morphology NOT REPORTED       Platelet Estimate NOT REPORTED     Basic Metabolic Panel     Collection Time: 21  2:05 PM   Result Value Ref Range     Glucose 450 (HH) 70 - 99 mg/dL     BUN 21 8 - 23 mg/dL     CREATININE 1.44 (H) 0.70 - 1.20 mg/dL     Bun/Cre Ratio 15 9 - 20     Calcium 9.5 8.6 - 10.4 mg/dL     Sodium 137 135 - 144 mmol/L     Potassium 4.9 3.7 - 5.3 mmol/L     Chloride 103 98 - 107 mmol/L     CO2 18 (L) 20 - 31 mmol/L     Anion Gap 16 9 - 17 mmol/L     GFR Non- 50 (L) >60 mL/min     GFR African American >60 >60 mL/min     GFR Comment            GFR Staging NOT REPORTED              Recent Labs     21  1405   HGB 16.4   HCT 51.2*   WBC 5.9   MCV 94.1      K 4.9      CO2 18*   BUN 21   CREATININE 1.44*   GLUCOSE 450*         No results for input(s): COVID19 in the last 720 hours. Imaging/Diagnostics:     No results found. [id number] See Epic. Diagnosis:       1. Osteomyelitis left foot 2nd toe, wound left 2nd toe     Plans:      1.  Left distal symes amputation        LIBBY Avery CNP  2021  2:47 PM               Routing History

## 2021-08-11 NOTE — ANESTHESIA PRE PROCEDURE
Department of Anesthesiology  Preprocedure Note       Name:  Efrain Wells   Age:  57 y.o.  :  1960                                          MRN:  [id number]         Date:  2021      Surgeon: Farooq Farrell): Cristal Carey DPM    Procedure: Procedure(s):  LEFT DISTAL SYMES AMPUTATION    Medications prior to admission:   Prior to Admission medications    Medication Sig Start Date End Date Taking? Authorizing Provider   NONFORMULARY Glupizide/metformin 2 times daily   Yes Historical Provider, MD   zolpidem (AMBIEN) 10 MG tablet Take 10 mg by mouth nightly. Yes Historical Provider, MD   oxyCODONE-acetaminophen (PERCOCET)  MG per tablet Take 1 tablet by mouth every 4 hours as needed for Pain.    Yes Historical Provider, MD   diclofenac sodium (VOLTAREN) 1 % GEL Apply topically 4 times daily as needed for Pain   Yes Historical Provider, MD   insulin glargine (LANTUS) 100 UNIT/ML injection vial Inject 40 Units into the skin 2 times daily 10/28/20  Yes LIBBY Morataya - NP   melatonin 5 MG TABS tablet Take 5-10 mg by mouth nightly    Yes Historical Provider, MD   Multiple Vitamins-Minerals (THERAPEUTIC MULTIVITAMIN-MINERALS) tablet Take 1 tablet by mouth daily   Yes Historical Provider, MD   doxycycline hyclate (VIBRA-TABS) 100 MG tablet Take 100 mg by mouth daily 21   Historical Provider, MD   celecoxib (CELEBREX) 100 MG capsule Take 100 mg by mouth daily    Historical Provider, MD   Liniments (BLUE-EMU SUPER STRENGTH) CREA Apply topically as needed (to hands)    Historical Provider, MD   Handicap Placard MISC by Does not apply route Dispense one temporary handicap placard for six months for ease of transportation 19   Rina Enriquez DPM   fluticasone (FLONASE) 50 MCG/ACT nasal spray 1-2 sprays by Each Nostril route daily as needed (BIPAP)     Historical Provider, MD   allopurinol (ZYLOPRIM) 300 MG tablet Take 300 mg by mouth daily    Historical Provider, MD       Current medications: Current Facility-Administered Medications   Medication Dose Route Frequency Provider Last Rate Last Admin    0.9 % sodium chloride infusion   Intravenous Continuous Camryn Pryor MD        lactated ringers infusion   Intravenous Continuous Camryn Pryor MD        sodium chloride flush 0.9 % injection 10 mL  10 mL Intravenous 2 times per day Hillary Chao MD        sodium chloride flush 0.9 % injection 10 mL  10 mL Intravenous PRN Hillary Chao MD        0.9 % sodium chloride infusion  25 mL Intravenous PRN Hillary Chao MD        lidocaine PF 1 % injection 1 mL  1 mL Intradermal Once PRN Hillary Chao MD           Allergies:  No Known Allergies    Problem List:    Patient Active Problem List   Diagnosis Code    Diabetes mellitus type 2 in obese ([facility]) E11.69, E66.9    Chronic ulcer of left foot with fat layer exposed ([facility] L97.522    Type 2 diabetes mellitus with hyperglycemia, with long-term current use of insulin ([facility]) E11.65, Z79.4    Ulcer of toe of left foot ([address] L97.529    Diabetic autonomic neuropathy associated with type 2 diabetes mellitus ([facility]) E11.43    Type 2 diabetes mellitus with diabetic peripheral angiopathy without gangrene ([address] E11.51    Diabetic foot infection ([address] E11.628, L08.9    Obstructive sleep apnea G47.33    Class 3 severe obesity due to excess calories with serious comorbidity and body mass index (BMI) of 40.0 to 44.9 in adult ([facility]) E66.01, Z68.41    High anion gap metabolic acidosis U07.2    CKD (chronic kidney disease) stage 3, GFR 30-59 ml/min ([facility]) N18.30    Diabetes mellitus due to underlying condition with diabetic neuropathy, without long-term current use of insulin ([facility]) E08.40    Abscess of foot L02.619    Non-healing ulcer of foot, left, with unspecified severity ([address] L97.529    Draining cutaneous sinus tract L98.8       Past Medical History:        Diagnosis Date    Abscess of foot     Arthritis     Chronic kidney disease     Chronic ulcer of left foot with fat layer exposed ([address] 10/26/2015    CKD (chronic kidney disease) stage 3, GFR 30-59 ml/min ([facility])     Class 3 severe obesity due to excess calories with serious comorbidity and body mass index (BMI) of 40.0 to 44.9 in adult ([address]     Diabetes mellitus ([address]     Diabetes mellitus due to underlying condition with diabetic neuropathy, without long-term current use of insulin ([address]     Diabetes mellitus type 2 in obese ([address] 10/26/2015    Diabetic autonomic neuropathy associated with type 2 diabetes mellitus ([address]     Diabetic foot infection ([address] 2/17/2019    Diabetic neuropathy ([address]     Gout     High anion gap metabolic acidosis 2/80/5088    History of kidney stones     Non-healing ulcer of foot, left, with unspecified severity ([address]     Obstructive sleep apnea     Osteomyelitis ([address] 12/3/2020    Sleep apnea     uses Bi-Pap nightly    Type 2 diabetes mellitus with diabetic peripheral angiopathy without gangrene ([address]     Type 2 diabetes mellitus with hyperglycemia, with long-term current use of insulin ([address]     Ulcer of toe of left foot ([address]        Past Surgical History:        Procedure Laterality Date    APPENDECTOMY      ARTHROPLASTY Left 12/3/2020    RESECTION LEFT FIRST MET PLANTAR WOUND DEBRIDEMENT performed by Farooq Varner DPM at [facility] Left 01/18/2016    FOOT DEBRIDEMENT Left 2/19/2019    LEFT FOOT   INCISION AND DRAINAGE WITH WOUND VAC APPLICATION performed by Farooq Varner DPM at [facility] Left 3/12/2019    LEFT FOOT WOUND DEBRIDEMENT WITH [employer] performed by Farooq Varner DPM at [facility] Left 58/28/1537    Application of 44 units of Apligraf biologic graft, left foot    FOOT SURGERY Left 02/19/2019    LEFT FOOT   INCISION AND DRAINAGE WITH WOUND VAC APPLICATION (Left )    FOOT SURGERY Left 2/22/2019    DELAYED PRIMARY CLOSURE LEFT FOOT      PULSEVAC performed by Farooq Varner DPM at [address] [place] [address] Left 03/12/2019    wound debridement    IR INS PICC VAD W SQ PORT GREATER THAN 5  10/26/2020    IR INS PICC VAD W SQ PORT GREATER THAN 5 10/26/2020 MD RUCHI Espinosa [facility] PROCED[facility]    LITHOTRIPSY  2013    NASAL SEPTUM SURGERY      OTHER SURGICAL HISTORY Left 04/03/2019    foot debridement and eppifix application    SKIN GRAFT Left 4/3/2019    THERASKIN AND EPIFIX APPLICATION  AND  LEFT FOOT WOUND DEBRIDEMENT AND WOUND PREP performed by Emily Chamorro DPM at [address] [place] [address] Left 4/18/2019    LEFT FOOT APPLICATION APPLIGRAFT performed by Emily Chamorro DPM at [address] [place] [address] Left 3/8/4678    GRAFT APPLICATION LEFT FOOT - WCB performed by Emily Chamorro DPM at [facility] Left 1/2016    foot    TOE AMPUTATION Left 10/21/2020    LEFT PARTIAL FIRST RAY  AMPUTATION performed by Sofia Ordaz DPM at [place] [address] History:    Social History     Tobacco Use    Smoking status: Never Smoker    Smokeless tobacco: Never Used   Substance Use Topics    Alcohol use:  Yes     Alcohol/week: 8.0 standard drinks     Types: 8 Cans of beer per week     Comment: occasional                                Counseling given: Not Answered      Vital Signs (Current):   Vitals:    08/11/21 0641   BP: 137/69   Pulse: 70   Resp: 16   Temp: 97.1 °F (36.2 °C)   TempSrc: Temporal   SpO2: 95%                                              BP Readings from Last 3 Encounters:   08/11/21 137/69   08/06/21 (!) 164/60   01/06/21 137/79       NPO Status: Time of last liquid consumption: 2300                        Time of last solid consumption: 1900                        Date of last liquid consumption: 08/10/21                        Date of last solid food consumption: 08/10/21    BMI:   Wt Readings from Last 3 Encounters:   08/06/21 (!) 374 lb 9 oz (169.9 kg)   01/06/21 (!) 356 lb (161.5 kg)   12/03/20 (!) 356 lb (161.5 kg)     There is no height or weight on file to calculate BMI.    CBC:   Lab Results   Component Value Date    WBC 5.9 08/06/2021    RBC 5.44 08/06/2021    RBC 4.85 09/23/2011    HGB 16.4 08/06/2021    HCT 51.2 08/06/2021    MCV 94.1 08/06/2021    RDW 12.6 08/06/2021     08/06/2021     09/23/2011       CMP:   Lab Results   Component Value Date     08/06/2021    K 4.9 08/06/2021     08/06/2021    CO2 18 08/06/2021    BUN 21 08/06/2021    CREATININE 1.44 08/06/2021    GFRAA >60 08/06/2021    LABGLOM 50 08/06/2021    GLUCOSE 450 08/06/2021    GLUCOSE 198 09/23/2011    PROT 7.5 10/26/2015    CALCIUM 9.5 08/06/2021    ALKPHOS 45 01/13/2016    ALT 39 01/13/2016       POC Tests:   Recent Labs     08/11/21  0704   POCGLU 130*       Coags:   Lab Results   Component Value Date    PROTIME 13.9 10/20/2020    INR 1.1 10/20/2020       HCG (If Applicable): No results found for: PREGTESTUR, PREGSERUM, HCG, HCGQUANT     ABGs: No results found for: PHART, PO2ART, EHJ6RWY, QNU9DML, BEART, R2BFHXAF     Type & Screen (If Applicable):  No results found for: LABABO, LABRH    Drug/Infectious Status (If Applicable):  No results found for: HIV, HEPCAB    COVID-19 Screening (If Applicable):   Lab Results   Component Value Date    COVID19 Not Detected 11/30/2020           Anesthesia Evaluation  Patient summary reviewed and Nursing notes reviewed no history of anesthetic complications:   Airway: Mallampati: III  TM distance: <3 FB   Neck ROM: full  Mouth opening: > = 3 FB Dental: normal exam         Pulmonary:normal exam  breath sounds clear to auscultation  (+) sleep apnea: on CPAP,                             Cardiovascular:Negative CV ROS  Exercise tolerance: good (>4 METS),         ECG reviewed  Rhythm: regular  Rate: normal                    Neuro/Psych:   Negative Neuro/Psych ROS              GI/Hepatic/Renal:   (+) renal disease: CRI,           Endo/Other:    (+) DiabetesType II DM, using insulin, . Abdominal:             Vascular: negative vascular ROS.          Other Findings:             Anesthesia Plan      MAC     ASA 3       Induction: intravenous. MIPS: Postoperative opioids intended and Prophylactic antiemetics administered. Anesthetic plan and risks discussed with patient. Plan discussed with CRNA.                   Claudene Grey, MD   8/11/2021

## 2021-08-11 NOTE — ANESTHESIA POSTPROCEDURE EVALUATION
Department of Anesthesiology  Postprocedure Note    Patient: April Perez  MRN: [id number]  [place]: 1960  Date of evaluation: 8/11/2021  Time:  11:44 AM     Procedure Summary     Date: 08/11/21 Room / Location: [place] [address] / [facility] - INPATIENT    Anesthesia Start: 9107 Anesthesia Stop: 3702    Procedure: LEFT DISTAL SYMES AMPUTATION (Left ) Diagnosis: (DX OSTEOMYELITIS 2ND LEFT, WOUND 2ND LEFT)    Surgeons: Rebekah Garay DPM Responsible Provider: Philippe Andrade MD    Anesthesia Type: MAC ASA Status: 3          Anesthesia Type: MAC    Margarita Phase I:      Margarita Phase II:      Last vitals: Reviewed and per EMR flowsheets.        Anesthesia Post Evaluation    Comments: POST- ANESTHESIA EVALUATION       Pt Name: April Perez  MRN: [id number]  YOB: 1960  Date of evaluation: 8/11/2021  Time:  11:44 AM      /81   Pulse 64   Temp 98.2 °F (36.8 °C) (Temporal)   Resp 22   SpO2 96%      Consciousness Level  Awake  Cardiopulmonary Status  Stable  Pain Adequately Treated YES  Nausea / Vomiting  NO  Adequate Hydration  YES  Anesthesia Related Complications NONE      Electronically signed by Philippe Andrade MD on 8/11/2021 at 11:44 AM

## 2021-08-11 NOTE — OP NOTE
PODIATRY OP NOTE    PATIENT NAME: Kingston Ventura  YOB: 1960  -  57 y.o. 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 PROCEDURE:   Distal symes amputation of 2nd digit, right foot    ANESTHESIA: MAC with local (10 cc2% lidocaine plain)    HEMOSTASIS: Pneumatic left ankle tourniquet @ 250 mmHg for 15 minutes. ESTIMATED BLOOD LOSS: Less than 5 cc. MATERIALS: 4-0 nylon  * No implants in log *    INJECTABLES: none    SPECIMEN: 2nd digit tuft and clearance fragment from the head of the middle phalanx  ID Type Source Tests Collected by Time Destination   1 : left foot 2nd toe bone culture Bone Toe GRAM STAIN, CULTURE, ANAEROBIC AND AEROBIC Corrine Botello DPM 8/11/2021 1597    A : left foot 2nd toe Tissue Toe SURGICAL PATHOLOGY Corrine Botello DPM 8/11/2021 5718        COMPLICATIONS: none    FINDINGS: Partial 2nd digit amputation. No purulent drainage or no underlying necrotic tissue noted    The patient was counseled at length about the risks of jossue Covid-19 during their perioperative period and any recovery window from their procedure. The patient was made aware that jossue Covid-19  may worsen their prognosis for recovering from their procedure  and lend to a higher morbidity and/or mortality risk. All material risks, benefits, and reasonable alternatives including postponing the procedure were discussed. The patient does wish to proceed with the procedure at this time. INDICATIONS FOR PROCEDURE: Kingston Ventura is a 57 y.o. male well known to Dr. Benton Griffith with a CC of chronic non healing wound to left 2nd digit tuft.  Patient has failed conservative treatment and surgical with adaptic, 4x4s, kerlix, and ACE. The tourniquet was then deflated after being inflated for 15 minutes and immediate hyperemia returned to all digits. The patient tolerated the procedure and anesthesia well and was transferred to the PACU with all vital signs stable and vascular status intact to the left foot and ankle. Following a period of postoperative monitoring, the patient will be discharged to home and given instructions and prescriptions which were discussed prior to the surgery. Patient will be limited weightbearing to the left foot in a surgical shoe. Instructed to keep dressing clean, dry, and intact until follow up with Dr. Efrain Valverde.       Marisela Parikh DPM   Podiatric Medicine & Surgery

## 2021-08-12 LAB — SURGICAL PATHOLOGY REPORT: NORMAL

## 2021-08-16 LAB
CULTURE: ABNORMAL
CULTURE: ABNORMAL
DIRECT EXAM: ABNORMAL
DIRECT EXAM: ABNORMAL
Lab: ABNORMAL
SPECIMEN DESCRIPTION: ABNORMAL

## (undated) DEVICE — SPONGE LAP W18XL18IN WHT COT 4 PLY FLD STRUNG RADPQ DISP ST

## (undated) DEVICE — BLADE SURG SAW OSC MIC S STL 17.5MM LEN 8.85MM W .4MM THCK

## (undated) DEVICE — GLOVE EXAM M L95IN FNGR THK35MIL PALM THK24MIL OFF WHT

## (undated) DEVICE — INTENDED FOR TISSUE SEPARATION, AND OTHER PROCEDURES THAT REQUIRE A SHARP SURGICAL BLADE TO PUNCTURE OR CUT.: Brand: BARD-PARKER ® CARBON RIB-BACK BLADES

## (undated) DEVICE — TOURNIQUET CUF BLD PRESSURE 4X18 IN 2 PRT SINGLE BLDR RED

## (undated) DEVICE — SKIN PREP TRAY W/CHG: Brand: MEDLINE INDUSTRIES, INC.

## (undated) DEVICE — PAD,EYE,1-5/8X2 5/8,STERILE,LF,1/PK: Brand: MEDLINE

## (undated) DEVICE — Z INACTIVE USE 2735373 APPLICATOR FBR LAIN COT WOOD TIP ECONOMICAL

## (undated) DEVICE — TOWEL,OR,DSP,ST,BLUE,STD,4/PK,20PK/CS: Brand: MEDLINE

## (undated) DEVICE — SOLUTION IV IRRIG 500ML 0.9% SODIUM CHL 2F7123

## (undated) DEVICE — GLOVE SURG SZ 6 L12IN FNGR THK87MIL WHT LTX FREE

## (undated) DEVICE — APPLICATOR MEDICATED 26 CC SOLUTION HI LT ORNG CHLORAPREP

## (undated) DEVICE — STANDARD HYPODERMIC NEEDLE,POLYPROPYLENE HUB: Brand: MONOJECT

## (undated) DEVICE — BANDAGE COMPR W4INXL5YD WHT BGE POLY COT M E WRP WV HK AND

## (undated) DEVICE — CONTAINER,SPECIMEN,OR STERILE,4OZ: Brand: MEDLINE

## (undated) DEVICE — GLOVE SURG SZ 65 L12IN FNGR THK87MIL WHT LTX FREE

## (undated) DEVICE — Device

## (undated) DEVICE — DRAPE,REIN 53X77,STERILE: Brand: MEDLINE

## (undated) DEVICE — STOCKINETTE,DOUBLE PLY,4X48,STERILE: Brand: MEDLINE

## (undated) DEVICE — STOCKINETTE,DOUBLE PLY,6X48,STERILE: Brand: MEDLINE

## (undated) DEVICE — GOWN,SIRUS,NONRNF,SETINSLV,XL,20/CS: Brand: MEDLINE

## (undated) DEVICE — GLOVE SURG SZ 7 L12IN FNGR THK79MIL GRN LTX FREE

## (undated) DEVICE — BLANKET WRM W29.9XL79.1IN UP BODY FORC AIR MISTRAL-AIR

## (undated) DEVICE — TUBING, SUCTION, 1/4" X 12', STRAIGHT: Brand: MEDLINE

## (undated) DEVICE — SUTURE VCRL SZ 4-0 L27IN ABSRB UD L26MM SH 1/2 CIR J415H

## (undated) DEVICE — GLOVE SURG SZ 65 L12IN FNGR THK79MIL GRN LTX FREE

## (undated) DEVICE — PRECISION THIN (9.0 X 0.38 X 25.0MM)

## (undated) DEVICE — HANDPIECE SET WITH COAXIAL HIGH FLOW TIP AND SUCTION TUBE: Brand: INTERPULSE

## (undated) DEVICE — GLOVE SURG SZ 75 L12IN FNGR THK87MIL WHT LTX FREE

## (undated) DEVICE — GOWN,SIRUS,NON REINFRCD,LARGE,SET IN SL: Brand: MEDLINE

## (undated) DEVICE — NEEDLE HYPO 25GA L1.5IN BLU POLYPR HUB S STL REG BVL STR

## (undated) DEVICE — BANDAGE COBAN 4 IN COMPR W4INXL5YD FOAM COHESIVE QUIK STK SELF ADH SFT

## (undated) DEVICE — NEEDLE ASPIR 11GA L152MM TAPR DST RAZ SHRP BVL TIP FOR BNE

## (undated) DEVICE — YANKAUER,FLEXIBLE HANDLE,REGLR CAPACITY: Brand: MEDLINE INDUSTRIES, INC.

## (undated) DEVICE — PAD,ABDOMINAL,5"X9",ST,LF,25/BX: Brand: MEDLINE INDUSTRIES, INC.

## (undated) DEVICE — HOOK LOCK LATEX FREE ELASTIC BANDAGE 4INX5YD

## (undated) DEVICE — GLOVE SURG SZ 6 CRM LTX FREE POLYISOPRENE POLYMER BEAD ANTI

## (undated) DEVICE — PAD,NON-ADHERENT,3X8,STERILE,LF,1/PK: Brand: MEDLINE

## (undated) DEVICE — STRIP,CLOSURE,WOUND,MEDI-STRIP,1/2X4: Brand: MEDLINE

## (undated) DEVICE — DUP USE 394429 BLADE SAW SAG OSCIL MED NAR 5.5MM

## (undated) DEVICE — GLOVE SURG SZ 8 L12IN FNGR THK87MIL WHT LTX FREE

## (undated) DEVICE — SUTURE VCRL SZ 3-0 L54IN ABSRB UD LIGAPAK REEL POLYGLACTIN J285G

## (undated) DEVICE — SMALL TEAR CROSS CUT RASP (11.0 X 5.0MM)

## (undated) DEVICE — STRIP SKIN CLSR W0.25XL4IN WHT SPUNBOUND FBR NYL HI ADH

## (undated) DEVICE — SUTURE ETHLN SZ 4-0 L18IN NONABSORBABLE BLK L19MM PS-2 3/8 1667H

## (undated) DEVICE — SUTURE ETHLN SZ 3-0 L18IN NONABSORBABLE BLK PS-2 L19MM 3/8 1669H

## (undated) DEVICE — GLOVE SURG SZ 7 L12IN FNGR THK87MIL WHT LTX FREE

## (undated) DEVICE — CANISTER NEG PRSS 1000ML W/ GEL INFOVAC

## (undated) DEVICE — 3M™ WARMING BLANKET, UPPER BODY, 10 PER CASE, 42268: Brand: BAIR HUGGER™

## (undated) DEVICE — PADDING CAST W6INXL4YD POLY POR SPUN DACRON SYN VERSATILE

## (undated) DEVICE — KIT NEG PRSS SM W2.95XH1.26XL3.94IN BLK HYDROPHOBIC FOAM W/

## (undated) DEVICE — PADDING UNDERCAST W4INXL4YD COT FBR LO LINTING WYTEX

## (undated) DEVICE — TOWEL,OR,DSP,ST,BLUE,DLX,XR,4/PK,20PK/CS: Brand: MEDLINE

## (undated) DEVICE — GLOVE SURG SZ 7 CRM LTX FREE POLYISOPRENE POLYMER BEAD ANTI

## (undated) DEVICE — SUTURE VCRL SZ 3-0 L27IN ABSRB UD L26MM SH 1/2 CIR J416H

## (undated) DEVICE — Z INACTIVE USE 2660664 SOLUTION IRRIG 3000ML 0.9% SOD CHL USP UROMATIC PLAS CONT

## (undated) DEVICE — GLOVE SURG SZ 75 CRM LTX FREE POLYISOPRENE POLYMER BEAD ANTI

## (undated) DEVICE — GLOVE SURG SZ 65 CRM LTX FREE POLYISOPRENE POLYMER BEAD ANTI

## (undated) DEVICE — SWAB CULT CLR BLU PLAS RAYON LIQ AMIES AERB ANAERB FRIC CAP

## (undated) DEVICE — PADDING UNDERCAST W6INXL4YD WYTEX 6 PER BG

## (undated) DEVICE — GAUZE,PACKING STRIP,IODOFORM,1/2"X5YD,ST: Brand: CURAD

## (undated) DEVICE — SOLUTION IV 1000ML 0.9% SOD CHL PH 5 INJ USP VIAFLX PLAS

## (undated) DEVICE — SUTURE PDS II SZ 4-0 L18IN ABSRB UD L19MM PS-2 3/8 CIR PRIM Z496G

## (undated) DEVICE — PADDING CAST W4INXL4YD SPUN DACRON POLY POR SYN VERSATILE

## (undated) DEVICE — MASTISOL ADHESIVE LIQ 2/3ML

## (undated) DEVICE — SUTURE PERMAHAND SZ 2-0 L30IN NONABSORBABLE BLK SILK W/O A305H

## (undated) DEVICE — DRAIN SURG W10XL20CM SIL SMOOTH FLAT 3/4 PERF DBL WRP

## (undated) DEVICE — GLOVE SURG SZ 8 CRM LTX FREE POLYISOPRENE POLYMER BEAD ANTI